# Patient Record
Sex: FEMALE | Race: WHITE | Employment: UNEMPLOYED | URBAN - METROPOLITAN AREA
[De-identification: names, ages, dates, MRNs, and addresses within clinical notes are randomized per-mention and may not be internally consistent; named-entity substitution may affect disease eponyms.]

---

## 2018-12-12 ENCOUNTER — HOSPITAL ENCOUNTER (OUTPATIENT)
Dept: PHYSICAL THERAPY | Age: 63
Discharge: HOME | End: 2018-12-12
Payer: MEDICARE

## 2018-12-12 PROCEDURE — G8978 MOBILITY CURRENT STATUS: HCPCS

## 2018-12-12 PROCEDURE — 97162 PT EVAL MOD COMPLEX 30 MIN: CPT

## 2018-12-12 PROCEDURE — 97110 THERAPEUTIC EXERCISES: CPT

## 2018-12-12 PROCEDURE — G8979 MOBILITY GOAL STATUS: HCPCS

## 2018-12-12 NOTE — PROGRESS NOTES
PT DAILY TREATMENT NOTE/FOOT AND ANKLE EVAL 10-18    Patient Name: Santos Kendall  Date:2018  : 1955  [x]  Patient  Verified  Payor: /    In time:8:20  Out time:9:12  Total Treatment Time (min): 52  Visit #: 1 of     Medicare/[facility] Only   Total Timed Codes (min):  29 1:1 Treatment Time:  52     Treatment Area: Right ankle sprain [S93.401A]    SUBJECTIVE  Pain Level (0-10 scale): 0/10  []constant []intermittent []improving []worsening []no change since onset    Any medication changes, allergies to medications, adverse drug reactions, diagnosis change, or new procedure performed?: [x] No    [] Yes (see summary sheet for update)  Subjective functional status/changes:       Pt is a 69 yo F who presents with right ankle pain s/p fall in 2018 during which she stood up after prolonged sitting and fell forward, landing with her right foot underneath of her. Imaging revealed avulsion fracture.   She had a CAM boot since initial injury until being cleared to DC the boot within the past week.       Barriers: []pain []financial []time []transportation []other  Motivation: high  Substance use: []Alcohol []Tobacco []other:   FABQ Score: []low []elevate  Cognition: A & O x 4    Other:    OBJECTIVE/EXAMINATION    23 min [x]Eval                  []Re-Eval       24 min Therapeutic Exercise:  [] See flow sheet : see HEP    Rationale: increase ROM and increase strength to improve the patients ability to improve ease of prolonged standing/ambulation    5 min Therapeutic Activity:  []  See flow sheet :   Rationale: Educated patient regarding findings of evaluation, 3 systems of balance, ankle/hip/stepping strategy, ice/heat use 10-15 minutes, ankle pumps to reduce swelling, normal foot mechanics with ambulation, new therex technique and purpose, purpose of therapy, and therapy plan in order to ensure pt understanding/compliance with therapy         With   [] TE   [] TA   [] neuro   [] other: Patient Education: [x] Review HEP    [] Progressed/Changed HEP based on:   [] positioning   [] body mechanics   [] transfers   [] heat/ice application    [] other:      Other Objective/Functional Measures:     /80 taken manually prior to therapy     Physical Therapy Evaluation  - Foot and Ankle    Gait: [] Normal    [x] Abnormal    [] Antalgic    [] NWB    Device:  Describe: SPC held on right, decreased heel strike on right, SPC advancing inconsistently with left/right foot     ROM/Strength  [] Unable to assess at this time      AROM        PROM            Strength (1-5)   Left Right Left Right Left  Right   Dorsiflexion 19 4   4 4   Plantarflexion 38 42       Inversion 12 11   4 4-   Eversion 42 26   3+ 4-   Great Toe Ext         Great Toe Flex           Hip Flexion: 4/5 B   Knee Extension: 4+/5 B  Knee Flexion: 4/5 B    Flexibility: [] Unable to assess at this time  Gastroc:    (L) Tightness [] WNL   [] Min   [] Mod   [] Severe    (R) Tightness [] WNL   [] Min   [x] Mod   [] Severe  Soleus:    (L) Tightness [] WNL   [] Min   [] Mod   [] Severe    (R) Tightness [] WNL   [x] Min   [] Mod   [] Severe    Palpation:   No tenderness bony prominences or musculature of right foot/ankle    Optional Tests:    Other tests/ comments:  SLS: Left 5 seconds, Right 4 seconds  Pt presents with soft ankle brace  No increased pain following session      Pain Level (0-10 scale) post treatment: 0/10    ASSESSMENT/Changes in Function: See POC    Patient will continue to benefit from skilled PT services to modify and progress therapeutic interventions, address functional mobility deficits, address ROM deficits, address strength deficits, analyze and address soft tissue restrictions, analyze and cue movement patterns, assess and modify postural abnormalities, address imbalance/dizziness and instruct in home and community integration to attain remaining goals.      [x]  See Plan of Care  []  See progress note/recertification  []  See Discharge Summary         Progress towards goals / Updated goals:  See POC    PLAN  [x]  Upgrade activities as tolerated     []  Continue plan of care  [x]  Update interventions per flow sheet       []  Discharge due to:_  []  Other:_      Rodolfo Garza PT 12/12/2018  7:56 AM

## 2018-12-12 NOTE — PROGRESS NOTES
[facility]  [facility] [employer] [facility] [address]  [phone] fax    Plan of Care/ Statement of Necessity for Physical Therapy Services    Patient name: Massiel Kenney Start of Care: 2018   Referral source: Carl Cassidy* : 1955    Medical Diagnosis: Right ankle sprain [S93.401A]  Payor: /  Onset Date:2018    Treatment Diagnosis: Right Ankle Pain    Prior Hospitalization: see medical history Provider#: [id number]   Medications: Verified on Patient summary List    Comorbidities:  Tobacco use (<1 pack per day)   Prior Level of Function: Ind with ambulation, lives with  in a one story home with 3 steps to enter with HR      The Plan of Care and following information is based on the information from the initial evaluation. Assessment/ key information: Pt is a 69 yo F who presents with right ankle pain s/p fall in 2018 during which she stood up after prolonged sitting and fell forward, landing with her right foot underneath of her. Pt reports a hx of falls with reason unknown, and she is following up with MD for further testing once ankle impairments have resolved. Denies dizziness. Imaging revealed 2 avulsion fractures per pt. She had a CAM boot since initial injury until being cleared to DC the boot within the past week. Pt presents with soft ankle brace and SPC, ambulating with SPC held on right, decreased heel strike on right, and SPC advancing inconsistently with left/right foot. Mild swelling is evident over lateral aspect of right ankle. Mild ankle AROM deficits are present, with greatest limitation evident in DF (4 dgrs). B ankle strength is decreased, and SLS is limited to 2 seconds, likely contributing to decreased stability during stance phase of gait. Further balance assessment was not performed d/t time constraint and will be performed in future sessions.   Pt will benefit from skilled PT to address strength, ROM, flexibility, edema, balance, and functional mobility deficits for return to PLOF. Evaluation Complexity History MEDIUM  Complexity : 1-2 comorbidities / personal factors will impact the outcome/ POC ; Examination MEDIUM Complexity : 3 Standardized tests and measures addressing body structure, function, activity limitation and / or participation in recreation  ;Presentation MEDIUM Complexity : Evolving with changing characteristics  ; Clinical Decision Making MEDIUM Complexity : FOTO score of 26-74  Overall Complexity Rating: MEDIUM  Problem List: pain affecting function, decrease ROM, decrease strength, edema affecting function, impaired gait/ balance, decrease ADL/ functional abilitiies, decrease activity tolerance and decrease flexibility/ joint mobility   Treatment Plan may include any combination of the following: Therapeutic exercise, Therapeutic activities, Neuromuscular re-education, Physical agent/modality, Gait/balance training, Manual therapy, Patient education, Self Care training, Functional mobility training, Home safety training and Stair training  Patient / Family readiness to learn indicated by: asking questions, trying to perform skills and interest  Persons(s) to be included in education: patient (P) and family support person (FSP);list daughter  Barriers to Learning/Limitations: None  Patient Goal (s): walk better and no pain  Patient Self Reported Health Status: good  Rehabilitation Potential: good    Short Term Goals: To be accomplished in 2 weeks:  1. Pt will be compliant with initial HEP to improve therapy outcomes   2. Pt will ambulate with correct sequencing with SPC in order to improve safety with ambulation   Long Term Goals: To be accomplished in 8 weeks:  1. Pt will improve FOTO by 16 points in order to demonstrate functional improvement  2. Pt will improve right ankle DF AROM to 10 dgrs in order to normalize gait mechanics for improved ambulatory tolerance  3.  Pt will improve right ankle strength to 4+/5 in order to improve ease of prolonged ambulation. 4. Pt will improve right SLS to 5 seconds in order to demonstrate improved stability during stance phase of gait. 5. Pt will report 50% improvement in order to improve QOL. Frequency / Duration: Patient to be seen 2-3 times per week for 8 weeks. Patient/ Caregiver education and instruction: Diagnosis, prognosis, activity modification and exercises   [x]  Plan of care has been reviewed with PTA    G-Codes (GP)  Mobility   Current  CK= 40-59%   Goal  CJ= 20-39%    The severity rating is based on clinical judgment and the FOTO score. Certification Period: 12/12/18 to 2/10/19  Aj Steele PT 12/12/2018 7:56 AM    ________________________________________________________________________    I certify that the above Therapy Services are being furnished while the patient is under my care. I agree with the treatment plan and certify that this therapy is necessary.     Physician's Signature:____________Date:_________TIME:________    ** Signature, Date and Time must be completed for valid certification **    Please sign and return to [facility]  Joel Barrios  [address] [place] [address]  [phone] fax

## 2018-12-19 ENCOUNTER — HOSPITAL ENCOUNTER (OUTPATIENT)
Dept: PHYSICAL THERAPY | Age: 63
Discharge: HOME | End: 2018-12-19
Payer: MEDICARE

## 2018-12-19 PROCEDURE — 97530 THERAPEUTIC ACTIVITIES: CPT

## 2018-12-19 PROCEDURE — 97140 MANUAL THERAPY 1/> REGIONS: CPT

## 2018-12-19 PROCEDURE — 97110 THERAPEUTIC EXERCISES: CPT

## 2018-12-19 NOTE — PROGRESS NOTES
PT DAILY TREATMENT NOTE - [facility]     Patient Name: Louie Favre  Date:2018  : 1955  [x]  Patient  Verified  Payor: [place] MED[place]ARE / Plan: [place] MEDICARE PART A & B / Product Type: Medicare /    In time:1100  Out time:1132  Total Treatment Time (min): 32  Total Timed Codes (min): 32  1:1 Treatment Time ( [address] only): 32   Visit #: 2 of     Treatment Area: Right ankle sprain [S93.401A]    SUBJECTIVE  Pain Level (0-10 scale): 110  Any medication changes, allergies to medications, adverse drug reactions, diagnosis change, or new procedure performed?: [x] No    [] Yes (see summary sheet for update)  Subjective functional status/changes:   [] No changes reported  Reports the plastic piece on the lateral part of her brace is irritating her ankle. She did a lot of walking yesterday and her ankle was swollen a lot last night. OBJECTIVE      24 min Therapeutic Exercise:  [] See flow sheet :   Rationale: increase ROM and increase strength to improve the patients ability to ease with ADL's  onale: increase ROM, increase strength and improve coordination  to improve the patients ability to ease with ADL's    8 min Manual Therapy:  TPR to gactroc. Rationale: decrease pain, increase ROM and decrease trigger points to ease with ambulation          With   [] TE   [] TA   [] neuro   [] other: Patient Education: [x] Review HEP    [] Progressed/Changed HEP based on:   [] positioning   [] body mechanics   [] transfers   [] heat/ice application    [] other:      Other Objective/Functional Measures: Pt is able to ambulate w/o AD with mild antalgic gait. Trigger points to gastroc. Ankle x 4 with OTB. Pain Level (0-10 scale) post treatment: 0/10    ASSESSMENT/Changes in Function: Progressing well in therapy. Pt does not use her cane at home. She says she can tolerate walking w/o the cane in her house and would like to stop using the cane soon.        Patient will continue to benefit from skilled PT services to modify and progress therapeutic interventions, address functional mobility deficits, address ROM deficits, address strength deficits, analyze and address soft tissue restrictions, analyze and cue movement patterns, analyze and modify body mechanics/ergonomics, assess and modify postural abnormalities and address imbalance/dizziness to attain remaining goals. [x]  See Plan of Care  []  See progress note/recertification  []  See Discharge Summary         Progress towards goals / Updated goals:  1. Pt will be compliant with initial HEP to improve therapy outcomes   2. Pt will ambulate with correct sequencing with SPC in order to improve safety with ambulation   Long Term Goals: To be accomplished in 8 weeks:  1. Pt will improve FOTO by 16 points in order to demonstrate functional improvement  2. Pt will improve right ankle DF AROM to 10 dgrs in order to normalize gait mechanics for improved ambulatory tolerance  3. Pt will improve right ankle strength to 4+/5 in order to improve ease of prolonged ambulation. 4. Pt will improve right SLS to 5 seconds in order to demonstrate improved stability during stance phase of gait.   5. Pt will report 50% improvement in order to improve QOL.       PLAN  [x]  Upgrade activities as tolerated     [x]  Continue plan of care  []  Update interventions per flow sheet       []  Discharge due to:_  []  Other:_      Hiram Olivas PT 12/19/2018  11:09 AM    Future Appointments   Date Time Provider Jenny Bro   12/24/2018  9:00 AM Mark Yoon PT [id number] [facility]   12/31/2018  2:30 PM Valentino Banda PT [id number] [facility]   1/3/2019 10:30 AM Valentino Banda PT [id number]   1/7/2019 10:30 AM Valentino Banda PT [id number] [facility]   1/10/2019 11:30 AM Mark Yoon PT [id number] [facility]   1/14/2019 11:00 AM Valentino Banda PT [id number] [facility]   1/17/2019 11:30 AM Mark Yoon PT [id number] [facility]   1/21/2019 11:00 AM Valentino Banda, PT [id number] [facility]   1/24/2019 11:30 AM Mark Yoon PT AZCHU [facility]   1/28/2019 10:30 AM Maciej Evans PT [id number] [facility]   1/31/2019 11:30 AM Maciej Evans PT [id number] [facility]

## 2018-12-24 ENCOUNTER — HOSPITAL ENCOUNTER (OUTPATIENT)
Dept: PHYSICAL THERAPY | Age: 63
Discharge: HOME | End: 2018-12-24
Payer: MEDICARE

## 2018-12-24 PROCEDURE — 97110 THERAPEUTIC EXERCISES: CPT

## 2018-12-24 NOTE — PROGRESS NOTES
PT DAILY TREATMENT NOTE - [facility]     Patient Name: Mena Choi  Date:2018  : 1955  [x]  Patient  Verified  Payor: Nakita Evangelista / Plan: [place] MEDICARE PART A & B / Product Type: Medicare /    In time:908  Out time:934  Total Treatment Time (min): 26  Total Timed Codes (min): 26  1:1 Treatment Time ( [address] only): 26   Visit #: 3 of     Treatment Area: Right ankle sprain [S93.401A]    SUBJECTIVE  Pain Level (0-10 scale): 0/10  Any medication changes, allergies to medications, adverse drug reactions, diagnosis change, or new procedure performed?: [x] No    [] Yes (see summary sheet for update)  Subjective functional status/changes:   [] No changes reported  No brace and no cane today. Pt reports she will be driving 3 hrs to visit daughter for maritza. OBJECTIVE    26 min Therapeutic Exercise:  [] See flow sheet :   Rationale: increase ROM, increase strength, improve coordination and improve balance to improve the patients ability to ease with ADL's    With   [] TE   [] TA   [] neuro   [] other: Patient Education: [x] Review HEP    [] Progressed/Changed HEP based on:   [] positioning   [] body mechanics   [] transfers   [] heat/ice application    [] other:      Other Objective/Functional Measures: Foam EO/EC=30 sec  Slow Marching w/o UE   Ambulates with slight Antalgic gait. Pain Level (0-10 scale) post treatment: 0/10    ASSESSMENT/Changes in Function: Progressing well. Pt reports she is doing better w/o the brace and cane. Issued BTB for hip Abduction  Issued tensogrip compression to wear for travels.      Patient will continue to benefit from skilled PT services to modify and progress therapeutic interventions, address functional mobility deficits, address ROM deficits, address strength deficits, analyze and address soft tissue restrictions, analyze and cue movement patterns, analyze and modify body mechanics/ergonomics, assess and modify postural abnormalities and address imbalance/dizziness to attain remaining goals. []  See Plan of Care  []  See progress note/recertification  []  See Discharge Summary         Progress towards goals / Updated goals:  1. Pt will be compliant with initial HEP to improve therapy outcomes   MET. 12/24/18  2. Pt will ambulate with correct sequencing with SPC in order to improve safety with ambulation   Long Term Goals: To be accomplished in 8 weeks:  1. Pt will improve FOTO by 16 points in order to demonstrate functional improvement  2. Pt will improve right ankle DF AROM to 10 dgrs in order to normalize gait mechanics for improved ambulatory tolerance  3. Pt will improve right ankle strength to 4+/5 in order to improve ease of prolonged ambulation. 4. Pt will improve right SLS to 5 seconds in order to demonstrate improved stability during stance phase of gait.   5. Pt will report 50% improvement in order to improve QOL.           PLAN  [x]  Upgrade activities as tolerated     [x]  Continue plan of care  []  Update interventions per flow sheet       []  Discharge due to:_  []  Other:_      Danny Rodriguez PT 12/24/2018  9:08 AM    Future Appointments   Date Time Provider Jenny Bro   12/31/2018  2:30 PM Anuj Smith, PT [email or web address] [facility]   1/3/2019 10:30 AM Anuj Smith, PT [id number] [facility]   1/7/2019 10:30 AM Anuj Smith, PT [id number] [facility]   1/10/2019 11:30 AM Mildred Talamantes, PT [email or web address] [facility]   1/14/2019 11:00 AM Anuj Smith, PT [id number] [facility]   1/17/2019 11:30 AM Mildred Talamantes, PT [id number] [facility]   1/21/2019 11:00 AM Anuj Smith, PT [id number] [facility]   1/24/2019 11:30 AM Mlidred Talamantes, PT [id number] [facility]   1/28/2019 10:30 AM Mildred Talamantes, PT THSOLHK [facility]   1/31/2019 11:30 AM Mildred Talamantes, PT [id number] [facility]

## 2018-12-31 ENCOUNTER — HOSPITAL ENCOUNTER (OUTPATIENT)
Dept: PHYSICAL THERAPY | Age: 63
Discharge: HOME | End: 2018-12-31
Payer: MEDICARE

## 2018-12-31 PROCEDURE — 97110 THERAPEUTIC EXERCISES: CPT

## 2018-12-31 NOTE — PROGRESS NOTES
PT DAILY TREATMENT NOTE 10-18 Patient Name: Carlos Miranda Date:2018 : 1955 [x]  Patient  Verified Payor: [place] MEDICARE / Plan: [place] El y / Product Type: Medicare / In time:2:25  Out time:3:05 Total Treatment Time (min): 40 Visit #: 4 of  Medicare/BCBS Only Total Timed Codes (min):  40 40 Treatment Area: Right ankle sprain [S93.401A] SUBJECTIVE Pain Level (0-10 scale): 0/10 Any medication changes, allergies to medications, adverse drug reactions, diagnosis change, or new procedure performed?: [x] No    [] Yes (see summary sheet for update) Subjective functional status/changes:   [] No changes reported Pt reports she has been out of town for the past week for the holidays. She had some pain in her anterior ankle yesterday but it's better now. She reports 30% improvement with therapy. She has stopped wearing the ankle brace. She has a good pair of tennis shoes and does not believe she needs the brace. She is just careful when she's walking. Her MD did not give her instructions for when to DC the brace. She is supposed to follow up with her MD at the end of January. OBJECTIVE 40 min Therapeutic Exercise:  [x] See flow sheet :  
Rationale: increase ROM, increase strength, improve coordination, improve balance and increase proprioception to improve the patients ability to improve ease of prolonged ambulation and daily tasks With 
 [] TE 
 [] TA 
 [] neuro 
 [] other: Patient Education: [x] Review HEP [] Progressed/Changed HEP based on:  
[] positioning   [] body mechanics   [] transfers   [] heat/ice application   
[] other:   
 
Other Objective/Functional Measures:  
 
No difficulty with 1# ankle weights during standing interventions Continues to require intermittent hand-held assist on parallel bars with Romberg Foam EC Challenged with marble , ceased after 1 minute d/t significant difficulty with grasping marbles Pain Level (0-10 scale) post treatment: 0/10 ASSESSMENT/Changes in Function:  
 
Pt is making slow, steady progress towards initial goals in therapy. Strength and balance continue to improve, evidenced by improving ease/decreasing UE support with interventions. Will continue to address strength, ROM, and balance deficits for improved ease of ambulation and return to PLOF. Patient will continue to benefit from skilled PT services to modify and progress therapeutic interventions, address functional mobility deficits, address ROM deficits, address strength deficits, analyze and address soft tissue restrictions, analyze and cue movement patterns, assess and modify postural abnormalities, address imbalance/dizziness and instruct in home and community integration to attain remaining goals. Progress towards goals / Updated goals: 1. Pt will be compliant with initial HEP to improve therapy outcomes MET. 12/24/18 2. Pt will ambulate with correct sequencing with SPC in order to improve safety with ambulation  
Long Term Goals: To be accomplished in 8 weeks: 1. Pt will improve FOTO by 16 points in order to demonstrate functional improvement 2. Pt will improve right ankle DF AROM to 10 dgrs in order to normalize gait mechanics for improved ambulatory tolerance 3. Pt will improve right ankle strength to 4+/5 in order to improve ease of prolonged ambulation. 4. Pt will improve right SLS to 5 seconds in order to demonstrate improved stability during stance phase of gait. 5. Pt will report 50% improvement in order to improve QOL. Progressing. Pt reports 30% improvement with therapy 12/31/18 PLAN 
[]  Upgrade activities as tolerated     [x]  Continue plan of care 
[]  Update interventions per flow sheet      
[]  Discharge due to:_ 
[]  Other:_ Skye Guerrero, PT 12/31/2018  2:35 PM 
 
Future Appointments Date Time Provider Jenny Bro 1/3/2019 10:30 AM Tanja Hams, PT [id number] [facility]  
1/7/2019 10:30 AM Tanja Villa, PT [email or web address] [facility]  
1/10/2019 11:30 AM Hillary Paget, PT [id number] [facility]  
1/14/2019 11:00 AM Tanja Villa, PT [id number] [facility]  
1/17/2019 11:30 AM Hillary Paget, PT [email or web address] [facility]  
1/21/2019 11:00 AM Tanja Villa, PT [email or web address] [facility]  
1/24/2019 11:30 AM Hillary Paget, PT [id number] [facility]  
1/28/2019 10:30 AM Hillary Paget, PT [id number] [facility]  
1/31/2019 11:30 AM Hillary Paget, PT [id number] [facility]

## 2019-01-03 ENCOUNTER — HOSPITAL ENCOUNTER (OUTPATIENT)
Dept: PHYSICAL THERAPY | Age: 64
Discharge: HOME | End: 2019-01-03
Payer: MEDICARE

## 2019-01-03 PROCEDURE — 97110 THERAPEUTIC EXERCISES: CPT

## 2019-01-03 NOTE — PROGRESS NOTES
PT DAILY TREATMENT NOTE 10-18    Patient Name: Selina Bacon  Date:1/3/2019  : 1955  [x]  Patient  Verified  Payor: [place] MED[place]ARE / Plan: [place] MEDICARE PART A & B / Product Type: Medicare /    In time:10:30  Out time:10:58  Total Treatment Time (min): 28  Visit #: 5 of -    Medicare/BCBS Only   Total Timed Codes (min):  28 1:1 Treatment Time:  27       Treatment Area: Right ankle sprain [S93.401A]    SUBJECTIVE  Pain Level (0-10 scale): 0/10  Any medication changes, allergies to medications, adverse drug reactions, diagnosis change, or new procedure performed?: [x] No    [] Yes (see summary sheet for update)  Subjective functional status/changes:   [] No changes reported  Pt reports that she is no longer using the [address] for any ambulation. She put her brace back on yesterday because her ankle was swollen and sore, and it felt much better with the brace on. She is going to continue wearing her brace. Next MD at the end of January. OBJECTIVE    28 min Therapeutic Exercise:  [x] See flow sheet :   Rationale: increase ROM, increase strength, improve coordination, improve balance and increase proprioception to improve the patients ability to improve ease of prolonged ambulation and daily tasks        With   [] TE   [] TA   [] neuro   [] other: Patient Education: [x] Review HEP    [] Progressed/Changed HEP based on:   [] positioning   [] body mechanics   [] transfers   [] heat/ice application    [] other:      Other Objective/Functional Measures:     SLS: Right 7 seconds, Left 5 seconds   No difficulty with tandem stance  Hand held assist initially during [employer] EC, no LOB with remaining ~25 seconds  No pain/difficulty with standing eccentric HR  No pain with therapy     Pain Level (0-10 scale) post treatment: 0/10    ASSESSMENT/Changes in Function:     Pt is making steady progress in therapy. SLS and walking tolerance are steadily improving, and pt reports 70% improvement with therapy.   She does not believe she has any functional limitations with her typical daily tasks at this time, but she is going to think about her goals for therapy prior to next session. Will continue to address strength and static/dynamic balance deficits for return to PLOF. Patient will continue to benefit from skilled PT services to modify and progress therapeutic interventions, address functional mobility deficits, address ROM deficits, address strength deficits, analyze and address soft tissue restrictions, analyze and cue movement patterns, assess and modify postural abnormalities, address imbalance/dizziness and instruct in home and community integration to attain remaining goals. Progress towards goals / Updated goals:  1. Pt will be compliant with initial HEP to improve therapy outcomes MET. 12/24/18  2. Pt will ambulate with correct sequencing with SPC in order to improve safety with ambulation DC Goal.  Pt is no longer using [facility] 1/3/18   Long Term Goals: To be accomplished in 8 weeks:  1. Pt will improve FOTO by 16 points in order to demonstrate functional improvement  2. Pt will improve right ankle DF AROM to 10 dgrs in order to normalize gait mechanics for improved ambulatory tolerance  3. Pt will improve right ankle strength to 4+/5 in order to improve ease of prolonged ambulation. 4. Pt will improve right SLS to 5 seconds in order to demonstrate improved stability during stance phase of gait. Goal met. SLS: Right 7 seconds, Left 5 seconds 1/3/18  5. Pt will report 50% improvement in order to improve QOL. Progressing.   Pt reports 70% improvement with therapy 1/3/18     PLAN  []  Upgrade activities as tolerated     [x]  Continue plan of care  []  Update interventions per flow sheet       []  Discharge due to:_  []  Other:_      Radha Canales PT 1/3/2019  10:37 AM    Future Appointments   Date Time Provider Jenny Bro   1/7/2019 10:30 AM [facility] [address]   1/10/2019 11:30 AM Dorian Lloyd Jessica Merida [id number] [facility]   1/14/2019 11:00 AM Nahid Carreno, PT [id number] [facility]   1/17/2019 11:30 AM Patrica Dyson, PT EYMEME [facility]   1/21/2019 11:00 AM Nahid Carreno, PT [id number] [facility]   1/24/2019 11:30 AM Patrica Dyson, PT [id number] [facility]   1/28/2019 10:30 AM Patrica Dyson, PT [id number] [facility]   1/31/2019 11:30 AM Patrica Dyson, PT [id number] [facility]

## 2019-01-07 ENCOUNTER — HOSPITAL ENCOUNTER (OUTPATIENT)
Dept: PHYSICAL THERAPY | Age: 64
Discharge: HOME | End: 2019-01-07
Payer: MEDICARE

## 2019-01-07 PROCEDURE — G8979 MOBILITY GOAL STATUS: HCPCS

## 2019-01-07 PROCEDURE — G8980 MOBILITY D/C STATUS: HCPCS

## 2019-01-07 PROCEDURE — 97110 THERAPEUTIC EXERCISES: CPT

## 2019-01-07 NOTE — PROGRESS NOTES
PT DISCHARGE DAILY NOTE AND ZDALOVO03-18    Date:2019  Patient name: Moody Bess Start of Care: 18   Referral source: Serena Bailey : 1955   Medical/Treatment Diagnosis: Right ankle sprain [S93.401A] Onset Date:2018      Prior Hospitalization: see medical history Provider#: [id number]   Medications: Verified on Patient Summary List    Comorbidities:  Tobacco use (<1 pack per day)   Prior Level of Function: Ind with ambulation, lives with  in a one story home with 3 steps to enter with HR                        Visits from Start of Care: 6    Missed Visits: 0    Reporting Period : 18 to 19    [x]  Patient  Verified  Payor: [place] MEDICARE / Plan: VA MEDICARE PART A & B / Product Type: Medicare /    In time:10:25  Out time:11:05  Total Treatment Time (min): 40  Total Timed Codes (min): 40  1:1 Treatment Time ([address] only): 40   Visit #: 6 of     SUBJECTIVE  Pain Level (0-10 scale): 0/10  Any medication changes, allergies to medications, adverse drug reactions, diagnosis change, or new procedure performed?: [x] No    [] Yes (see summary sheet for update)  Subjective functional status/changes:   [] No changes reported  Pt reports 100% improvement with therapy.      OBJECTIVE    40 min Therapeutic Exercise:  [x] See flow sheet :   Rationale: increase ROM, increase strength, improve balance and increase proprioception to improve the patients ability to continue to progress independently upon DC             With   [] TE   [] TA   [] neuro   [] other: Patient Education: [x] Review HEP    [] Progressed/Changed HEP based on:   [] positioning   [] body mechanics   [] transfers   [] heat/ice application    [] other:      Other Objective/Functional Measures:     MMT Right Ankle  DF 4+/5  Inversion: 4+5  Eversion: 4/5  PF: 4/5 (12 SL HR)     Right Ankle DF AROM: 14 dgrs     FOTO: 74     Pain Level (0-10 scale) post treatment: 0/10    Summary of Care:  Goal: Pt will be compliant with initial HEP to improve therapy outcomes  Status at last note/certification: Goal met. Status at discharge: met    Goal: Pt will ambulate with correct sequencing with SPC in order to improve safety with ambulation  Status at last note/certification: DC Goal.  Pt is no longer using SPC  Status at discharge: met    Goal: Pt will improve FOTO by 16 points in order to demonstrate functional improvement  Status at last note/certification: Goal met. Improved 27 points   Status at discharge: met    Goal: Pt will improve right ankle DF AROM to 10 dgrs in order to normalize gait mechanics for improved ambulatory tolerance  Status at last note/certification: Goal met. 14 dgrs   Status at discharge: met    Goal:Pt will improve right ankle strength to 4+/5 in order to improve ease of prolonged ambulation. Status at last note/certification: Goal met/Progressing   DF 4+/5  Inversion: 4+5  Eversion: 4/5  PF: 4/5   Status at discharge: met    Goal: Pt will improve right SLS to 5 seconds in order to demonstrate improved stability during stance phase of gait. Status at last note/certification:  Goal met. SLS: Right 7 seconds, Left 5 seconds  Status at discharge: met    Goal: Pt will report 50% improvement in order to improve QOL  Status at last note/certification: Goal met. Pt reports 100% improvement   Status at discharge: met    ASSESSMENT/Changes in Function:     Pt has made steady progress in therapy, meeting 100% of initial goals with exception of SPC goal as pt is no longer using SPC. Right ankle strength/ROM and SLS have significantly improved. Pt reports 100% improvement with therapy, and significant improvement in FOTO score is indicative of functional improvement. Pt was given updated HEP to address remaining deficits and is DC at this time as she has met therapy goals.      G-Codes (GP)  Mobility   [id number] Goal  CJ= 20-39%  D/C  CJ= 20-39%    The severity rating is based on clinical judgment and the FOTO score.      Thank you for this referral!     PLAN  [x]Discontinue therapy: [x]Patient has reached or is progressing toward set goals      []Patient is non-compliant or has abdicated      []Due to lack of appreciable progress towards set goals    Tammy Floyd, PT 1/7/2019  10:34 AM

## 2019-01-10 ENCOUNTER — APPOINTMENT (OUTPATIENT)
Dept: PHYSICAL THERAPY | Age: 64
End: 2019-01-10
Payer: MEDICARE

## 2019-01-14 ENCOUNTER — APPOINTMENT (OUTPATIENT)
Dept: PHYSICAL THERAPY | Age: 64
End: 2019-01-14
Payer: MEDICARE

## 2019-01-17 ENCOUNTER — APPOINTMENT (OUTPATIENT)
Dept: PHYSICAL THERAPY | Age: 64
End: 2019-01-17
Payer: MEDICARE

## 2019-01-21 ENCOUNTER — APPOINTMENT (OUTPATIENT)
Dept: PHYSICAL THERAPY | Age: 64
End: 2019-01-21
Payer: MEDICARE

## 2019-01-24 ENCOUNTER — APPOINTMENT (OUTPATIENT)
Dept: PHYSICAL THERAPY | Age: 64
End: 2019-01-24
Payer: MEDICARE

## 2019-01-28 ENCOUNTER — APPOINTMENT (OUTPATIENT)
Dept: PHYSICAL THERAPY | Age: 64
End: 2019-01-28
Payer: MEDICARE

## 2019-01-31 ENCOUNTER — APPOINTMENT (OUTPATIENT)
Dept: PHYSICAL THERAPY | Age: 64
End: 2019-01-31
Payer: MEDICARE

## 2020-01-13 ENCOUNTER — OFFICE VISIT (OUTPATIENT)
Dept: ORTHOPEDIC SURGERY | Age: 65
End: 2020-01-13

## 2020-01-13 VITALS
SYSTOLIC BLOOD PRESSURE: 139 MMHG | OXYGEN SATURATION: 98 % | DIASTOLIC BLOOD PRESSURE: 78 MMHG | HEART RATE: 82 BPM | WEIGHT: 133.2 LBS | BODY MASS INDEX: 20.91 KG/M2 | RESPIRATION RATE: 15 BRPM | TEMPERATURE: 96.2 F | HEIGHT: 67 IN

## 2020-01-13 DIAGNOSIS — M25.511 RIGHT SHOULDER PAIN, UNSPECIFIED CHRONICITY: ICD-10-CM

## 2020-01-13 DIAGNOSIS — M75.41 SHOULDER IMPINGEMENT SYNDROME, RIGHT: Primary | ICD-10-CM

## 2020-01-13 RX ORDER — CETIRIZINE HCL 10 MG
TABLET ORAL DAILY
COMMUNITY

## 2020-01-13 RX ORDER — CHROMIUM PICOLINATE 200 MCG
TABLET ORAL DAILY
COMMUNITY

## 2020-01-13 RX ORDER — FLUTICASONE PROPIONATE 50 MCG
2 SPRAY, SUSPENSION (ML) NASAL
COMMUNITY

## 2020-01-13 RX ORDER — TRIAMCINOLONE ACETONIDE 40 MG/ML
40 INJECTION, SUSPENSION INTRA-ARTICULAR; INTRAMUSCULAR ONCE
Qty: 1 ML | Refills: 0
Start: 2020-01-13 | End: 2020-01-13

## 2020-01-13 RX ORDER — PANTOPRAZOLE SODIUM 40 MG/1
40 TABLET, DELAYED RELEASE ORAL DAILY
COMMUNITY

## 2020-01-13 RX ORDER — ASPIRIN 81 MG/1
TABLET ORAL DAILY
COMMUNITY

## 2020-01-13 NOTE — PATIENT INSTRUCTIONS
Aftercare Instructions following your Cortisone Injection:    Your cortisone injection today included both a pain reliever and a steroid. You can expect the pain to begin to improve in the next 30-45 mins secondary to the pain reliever. The steroid medicine itself generally takes up to 48 hours to being to decrease the inflammation. We recommend to avoid strenuous activities on the day you get the injection. The following day you may gradually increase your activities as tolerated. NOTHING should cause an increase in pain or swelling    Rarely the following side effects can occur:  1. Flushing or redness - this is a normal reaction and will subside within a day. Benedryl and tylenol can help decrease the symptoms  2. Acute increase in pain - this is also something normal that can occur day of the injection. If the pain occures, you can put ice or a cold pack on the painful area for 10 to 20 minutes at a time. Put a thin cloth between the ice and your skin. 3. If you are a diabetic a cortisone injection can cause your glucose (sugar) to rise. Please monitor your sugar closely for 2 days following the injection. If your sugar is elevated beyond your normal please contact your PCP immediately. We recommend calling when the pain returns.  If the pain returns in next few weeks we will order an MRI

## 2020-01-13 NOTE — PROGRESS NOTES
1. Have you been to the ER, urgent care clinic since your last visit? Hospitalized since your last visit? No    2. Have you seen or consulted any other health care providers outside of the [address] [place] [address] since your last visit? Include any pap smears or colon screening.  No

## 2020-01-13 NOTE — PROGRESS NOTES
Juancarlos Cabello  1955   Chief Complaint   Patient presents with    Shoulder Pain     right shoulder pain        HISTORY OF PRESENT ILLNESS  Juancarlos Cabello is a 77 y.o. female who presents today for evaluation of right shoulder pain. She notes pain started gradually about 1 month ago. No injury. Gradually worsening. Worse when she reaches overhead and lifts. She notes prior surgery at [facility] about 17 years ago. Patient describes the pain as sharp and stabbing that is Constant in nature. Symptoms are worse with overhead work and lifting and is better with  inactivity. Associated symptoms include weakness. Since problem started, it: has worsened. Pain does wake patient up at night. Has taken taking tylenol and ibuprofen for the problem. Pain is a 9/10. Has tried following treatments: Injections:NO; Brace:NO;  Therapy:NO; Cane/Crutch:NO       No Known Allergies     Past Medical History:   Diagnosis Date    Arthritis       Social History     Socioeconomic History    Marital status: UNKNOWN     Spouse name: Not on file    Number of children: Not on file    Years of education: Not on file    Highest education level: Not on file   Occupational History    Not on file   Social Needs    Financial resource strain: Not on file    Food insecurity:     Worry: Not on file     Inability: Not on file    Transportation needs:     Medical: Not on file     Non-medical: Not on file   Tobacco Use    Smoking status: Current Every Day Smoker    Smokeless tobacco: Never Used   Substance and Sexual Activity    Alcohol use: Not on file    Drug use: Not on file    Sexual activity: Not on file   Lifestyle    Physical activity:     Days per week: Not on file     Minutes per session: Not on file    Stress: Not on file   Relationships    Social connections:     Talks on phone: Not on file     Gets together: Not on file     Attends [employer] service: Not on file     Active member of club or organization: Not on file     Attends meetings of clubs or organizations: Not on file     Relationship status: Not on file    Intimate partner violence:     Fear of current or ex partner: Not on file     Emotionally abused: Not on file     Physically abused: Not on file     Forced sexual activity: Not on file   Other Topics Concern    Not on file   Social History Narrative    Not on file      Past Surgical History:   Procedure Laterality Date    HAND/FINGER SURGERY UNLISTED      HX  SECTION        Family History   Problem Relation Age of Onset    Arthritis-osteo Mother     Arthritis-osteo Father       Current Outpatient Medications   Medication Sig    pantoprazole (PROTONIX) 40 mg tablet Take 40 mg by mouth daily.  cetirizine (ZYRTEC) 10 mg tablet Take  by mouth.  aspirin delayed-release 81 mg tablet Take  by mouth daily.  Calcium-Cholecalciferol, D3, (CALCIUM 600 WITH VITAMIN D3) 600 mg(1,500mg) -400 unit cap Take  by mouth.  fluticasone propionate (FLONASE) 50 mcg/actuation nasal spray 2 Sprays by Both Nostrils route daily.  triamcinolone acetonide (KENALOG) 40 mg/mL injection 1 mL by Intra artICUlar route once for 1 dose. No current facility-administered medications for this visit. REVIEW OF SYSTEM   Patient denies: Weight loss, Fever/Chills, HA, Visual changes, Fatigue, Chest pain, SOB, Abdominal pain, N/V/D/C, Blood in stool or urine, Edema. Pertinent positive as above in HPI. All others were negative    PHYSICAL EXAM:   Visit Vitals  /78 (BP 1 Location: Right arm, BP Patient Position: Sitting)   Pulse 82   Temp 96.2 °F (35.7 °C) (Oral)   Resp 15   Ht 5' 7\" (1.702 m)   Wt 133 lb 3.2 oz (60.4 kg)   SpO2 98%   BMI 20.86 kg/m²     The patient is a well-developed, well-nourished female   in no acute distress. The patient is alert and oriented times three. The patient is alert and oriented times three.  Mood and affect are normal.  LYMPHATIC: lymph nodes are not enlarged and are within normal limits  SKIN: normal in color and non tender to palpation. There are no bruises or abrasions noted. NEUROLOGICAL: Motor sensory exam is within normal limits. Reflexes are equal bilaterally. There is normal sensation to pinprick and light touch  MUSCULOSKELETAL:  Examination Right shoulder   Skin Intact   AC joint tenderness -   Biceps tenderness +   Forward flexion/Elevation    Active abduction    Glenohumeral abduction 90   External rotation ROM 60   Internal rotation ROM 50   Apprehension -   Morelias Relocation -   Jerk -   Load and Shift -   Obriens -   Speeds -   Impingement sign +   Supraspinatus/Empty Can +   External Rotation Strength -, 5/5   Lift Off/Belly Press -, 5/5   Neurovascular Intact          PROCEDURE: Right shoulder Injection    Indication:Right shoulder pain/swelling    After sterile prep, 6 cc of Xylocaine and 1 cc of Kenalog were injected into the right shoulder.        [facility]  OFFICE PROCEDURE PROGRESS NOTE        Chart reviewed for the following:  Shivam WORKMAN PA, have reviewed the History, Physical and updated the Allergic reactions for [address] [place] [address] performed immediately prior to start of procedure:  Shivam WORKMAN PA-C, have performed the following reviews on [facility] prior to the start of the procedure:            * Patient was identified by name and date of birth   * Agreement on procedure being performed was verified  * Risks and Benefits explained to the patient  * Procedure site verified and marked as necessary  * Patient was positioned for comfort  * Consent was signed and verified     Time: 2:31 PM    Date of procedure: 1/13/2020    Procedure performed by:  JACQUELINE Stout    Provider assisted by: (see medication administration)    How tolerated by patient: tolerated the procedure well with no complications    Comments: none     IMAGING: 3 view xray images of right  shoulder taken in office on 1/13/2020 read and reviewed by myself reveal subacromial spur. No acute abnormalities     IMPRESSION:      ICD-10-CM ICD-9-CM    1. Shoulder impingement syndrome, right M75.41 726.2 DRAIN/INJECT LARGE JOINT/BURSA      TRIAMCINOLONE ACETONIDE INJ      triamcinolone acetonide (KENALOG) 40 mg/mL injection   2. Right shoulder pain, unspecified chronicity M25.511 719.41 AMB POC XRAY, SHOULDER; COMPLETE, 2+        PLAN:   1. Patient with right shoulder pain c/w impingement. Will inject with cortisone today. If no improvement with order MRI r/o RCT  Risk factors include: n/a  2. Yes cortisone injection indicated today   3. No Physical/Occupational Therapy indicated today  4. No diagnostic test indicated today:   5. No durable medical equipment indicated today  6. No referral indicated today   7. No medications indicated today:   8.  No Narcotic indicated today    RTC 3 weeks if pain persists     Yahaira Song PA-C  [address] [place] [address] and Spine Specialist

## 2020-02-19 ENCOUNTER — OFFICE VISIT (OUTPATIENT)
Dept: ORTHOPEDIC SURGERY | Age: 65
End: 2020-02-19

## 2020-02-19 VITALS
DIASTOLIC BLOOD PRESSURE: 70 MMHG | OXYGEN SATURATION: 99 % | HEIGHT: 67 IN | HEART RATE: 80 BPM | BODY MASS INDEX: 20.94 KG/M2 | RESPIRATION RATE: 15 BRPM | TEMPERATURE: 97.6 F | SYSTOLIC BLOOD PRESSURE: 124 MMHG | WEIGHT: 133.4 LBS

## 2020-02-19 DIAGNOSIS — M75.101 NONTRAUMATIC TEAR OF RIGHT ROTATOR CUFF, UNSPECIFIED TEAR EXTENT: Primary | ICD-10-CM

## 2020-02-19 RX ORDER — CELECOXIB 200 MG/1
200 CAPSULE ORAL 2 TIMES DAILY
Qty: 60 CAP | Refills: 0 | Status: SHIPPED | OUTPATIENT
Start: 2020-02-19 | End: 2020-05-12

## 2020-02-19 NOTE — PROGRESS NOTES
1. Have you been to the ER, urgent care clinic since your last visit? Hospitalized since your last visit? No    2. Have you seen or consulted any other health care providers outside of the [address] [place] [address] since your last visit? Include any pap smears or colon screening.  No

## 2020-02-19 NOTE — PROGRESS NOTES
Juancarlos Cabello  1955   Chief Complaint   Patient presents with    Shoulder Pain     right shoulder pain        HISTORY OF PRESENT ILLNESS  Juancarlos Cabello is a 77 y.o. female who presents today for reevaluation of right shoulder pain. Patient rates pain as 7/10 today. She notes pain started gradually about 2 months ago. No injury. Gradually worsening. Worse when she reaches overhead and lifts. She notes prior surgery at [address] about 17 years ago. At last OV on 1/13/2020, patient had a cortisone injection which provided minimal relief. She notes the pain has worsened since last OV. Pt reports a popping sensation and limited ROM with lifting the arm. Pain at night. Has been taking Tylenol. Patient denies any fever, chills, chest pain, shortness of breath or calf pain. The remainder of the review of systems in negative. There are no new illness or injuries to report since last seen in the office. There are no changes to medications, allergies, family or social history. Pain Assessment  2/19/2020   Location of Pain Shoulder   Location Modifiers Right   Severity of Pain 7   Quality of Pain Aching;Dull   Duration of Pain Persistent   Frequency of Pain Constant   Aggravating Factors Bending   Aggravating Factors Comment range motion   Relieving Factors Nothing   Result of Injury No     PHYSICAL EXAM:   Visit Vitals  /70 (BP 1 Location: Left arm, BP Patient Position: Sitting)   Pulse 80   Temp 97.6 °F (36.4 °C) (Oral)   Resp 15   Ht 5' 7\" (1.702 m)   Wt 133 lb 6.4 oz (60.5 kg)   SpO2 99%   BMI 20.89 kg/m²     The patient is a well-developed, well-nourished female   in no acute distress. The patient is alert and oriented times three. The patient is alert and oriented times three. Mood and affect are normal.  LYMPHATIC: lymph nodes are not enlarged and are within normal limits  SKIN: normal in color and non tender to palpation. There are no bruises or abrasions noted.    NEUROLOGICAL: Motor sensory exam is within normal limits. Reflexes are equal bilaterally. There is normal sensation to pinprick and light touch  MUSCULOSKELETAL:  Examination Right shoulder   Skin Intact   AC joint tenderness -   Biceps tenderness +   Forward flexion/Elevation    Active abduction    Glenohumeral abduction 90   External rotation ROM 60   Internal rotation ROM 50   Apprehension -   Morelias Relocation -   Jerk -   Load and Shift -   Obriens -   Speeds -   Impingement sign +   Supraspinatus/Empty Can +   External Rotation Strength -, 5/5   Lift Off/Belly Press -, 5/5   Neurovascular Intact      IMAGING: 3 view xray images of right  shoulder taken in office on 1/13/2020 read and reviewed by myself reveal subacromial spur. No acute abnormalities     IMPRESSION:      ICD-10-CM ICD-9-CM    1. Nontraumatic tear of right rotator cuff, unspecified tear extent M75.101 727.61 MRI SHOULDER RT WO CONT      celecoxib (CELEBREX) 200 mg capsule        PLAN:   1. Patient presents with right shoulder pain that has increased after the cortisone injection given at last OV. I am ordering an MRI to r/o a rotator cuff tear and she was given a prescription for Celebrex today. She can continue with all activities as tolerated. Risk factors include: n/a  2. No ultrasound exam indicated today  3. No cortisone injection indicated today   4. No Physical/Occupational Therapy indicated today  5. Yes diagnostic test indicated today: MRI R SHOULDER  6. No durable medical equipment indicated today  7. No referral indicated today   8. Yes medications indicated today: CELEBREX  9.  No Narcotic indicated today       RTC following MRI      Scribed by [address]) as dictated by JUNIOR Hernandez PA-C [facility] [phone] and Spine Specialist

## 2020-02-26 ENCOUNTER — HOSPITAL ENCOUNTER (OUTPATIENT)
Age: 65
Discharge: HOME | End: 2020-02-26
Attending: PHYSICIAN ASSISTANT
Payer: MEDICARE

## 2020-02-26 ENCOUNTER — TELEPHONE (OUTPATIENT)
Dept: ORTHOPEDIC SURGERY | Age: 65
End: 2020-02-26

## 2020-02-26 DIAGNOSIS — M75.101 NONTRAUMATIC TEAR OF RIGHT ROTATOR CUFF, UNSPECIFIED TEAR EXTENT: ICD-10-CM

## 2020-02-26 PROCEDURE — 73221 MRI JOINT UPR EXTREM W/O DYE: CPT

## 2020-02-26 NOTE — TELEPHONE ENCOUNTER
Patient had her MRI done this morning and called to schedule a follow up. There is nothing until 03/09/20. Is there somewhere she can be fit in sooner? Please advise.     Patient [phone]

## 2020-03-03 ENCOUNTER — OFFICE VISIT (OUTPATIENT)
Dept: ORTHOPEDIC SURGERY | Age: 65
End: 2020-03-03

## 2020-03-03 VITALS
TEMPERATURE: 97.9 F | SYSTOLIC BLOOD PRESSURE: 125 MMHG | BODY MASS INDEX: 21.12 KG/M2 | HEART RATE: 70 BPM | WEIGHT: 134.6 LBS | DIASTOLIC BLOOD PRESSURE: 67 MMHG | OXYGEN SATURATION: 99 % | HEIGHT: 67 IN | RESPIRATION RATE: 16 BRPM

## 2020-03-03 DIAGNOSIS — M75.121 NONTRAUMATIC COMPLETE TEAR OF RIGHT ROTATOR CUFF: Primary | ICD-10-CM

## 2020-03-03 NOTE — PROGRESS NOTES
Hussain Holliday  1955   Chief Complaint   Patient presents with    Shoulder Pain     Right shoulder pain         HISTORY OF PRESENT ILLNESS  Hussain Holliday is a 77 y.o. female who presents today for reevaluation of right shoulder pain and MRI review. Patient rates pain as 7/10 today. She notes pain started in December. No injury. Gradually worsening. Worse when she reaches overhead and lifts. She notes prior surgery on her right biceps tendon at [address] about 18 years ago. The patient had a cortisone injection on 1/13/2020 which provided minimal relief. She notes pain at night with laying on her right side. Pt reports a popping sensation and limited ROM with lifting the arm. Has been taking Celebrex with minimal relief. Surgery was discussed with the patient today and they would like to move forward with scheduling surgery. Patient denies any fever, chills, chest pain, shortness of breath or calf pain. The remainder of the review of systems is negative. There are no new illness or injuries to report since last seen in the office. There are no changes to medications, allergies, family or social history. Pain Assessment  3/3/2020   Location of Pain Shoulder   Location Modifiers Right   Severity of Pain 7   Quality of Pain Throbbing   Duration of Pain Persistent   Frequency of Pain Constant   Aggravating Factors (No Data)   Aggravating Factors Comment Using the arm a lot    Relieving Factors Rest   Relieving Factors Comment Pain medication    Result of Injury No     PHYSICAL EXAM:   Visit Vitals  /67   Pulse 70   Temp 97.9 °F (36.6 °C) (Oral)   Resp 16   Ht 5' 7\" (1.702 m)   Wt 134 lb 9.6 oz (61.1 kg)   SpO2 99%   BMI 21.08 kg/m²     The patient is a well-developed, well-nourished female   in no acute distress. The patient is alert and oriented times three. The patient is alert and oriented times three.  Mood and affect are normal.  LYMPHATIC: lymph nodes are not enlarged and are within normal limits  SKIN: normal in color and non tender to palpation. There are no bruises or abrasions noted. NEUROLOGICAL: Motor sensory exam is within normal limits. Reflexes are equal bilaterally. There is normal sensation to pinprick and light touch  MUSCULOSKELETAL:  Examination Right shoulder   Skin Intact   AC joint tenderness -   Biceps tenderness +   Forward flexion/Elevation    Active abduction    Glenohumeral abduction 90   External rotation ROM 60   Internal rotation ROM 50   Apprehension -   Morelias Relocation -   Jerk -   Load and Shift -   Obriens -   Speeds -   Impingement sign +   Supraspinatus/Empty Can +   External Rotation Strength -, 5/5   Lift Off/Belly Press -, 5/5   Neurovascular Intact        IMAGING: MRI of right shoulder dated 2/26/2020 reviewed and read by Dr. Pepe Rutledge:  IMPRESSION:  1. Thin obliquely are oriented full thickness tear involving the supraspinatus and infraspinatus tendons at the enthesis. Low-grade infraspinatus myotendinous junction tear. Mild supraspinatus and infraspinatus fatty infiltration. 2.  Subscapularis articular sided fraying, involving the superior third fibers. 3.  Prior biceps tenotomy. 4.  Findings which may predispose to subacromial impingement. 5.  Moderate sized inferior directed distal clavicle osteophyte and coracoacromial ligament thickening. 6.  Moderate glenohumeral osteoarthritis with multiple intra-articular bodies in the inferior joint and subscapularis recess. Degenerative SLAP tear. 3 view xray images of right  shoulder taken in office on 1/13/2020 read and reviewed by JACQUELINE Harris, reveal: subacromial spur. No acute abnormalities     IMPRESSION:      ICD-10-CM ICD-9-CM    1. Nontraumatic complete tear of right rotator cuff M75.121 727.61         PLAN:   1. I discussed the risks and benefits and potential adverse outcomes of both operative vs non operative treatment of right massive rotator cuff tear with the patient. Patient wishes to proceed with arthroscopic right massive rotator cuff repair. Risks of operative intervention include but not limited to bleeding, infection, deep vein thrombosis, pulmonary embolism, death, limb length discrepancy, reflexive sympathetic dystrophy, fat embolism syndrome,damage to blood vessels and nerves, malunion, non-union, delayed union, failure of hardware, post traumatic arthritis, stroke, heart attack, and death. Patient understands that infection may arise and may require numerous surgeries. History and physical exam scheduled for a later date. Risk factors include: n/a  2. No ultrasound exam indicated today  3. No cortisone injection indicated today   4. No Physical/Occupational Therapy indicated today  5. No diagnostic test indicated today:   6. No durable medical equipment indicated today  7. No referral indicated today   8. No medications indicated today:   9. No Narcotic indicated today        RTC H&P      Scribed by Ephraim Cummings) as dictated by Andressa Arce MD    I, Dr. Andressa Arce, confirm that all documentation is accurate.     Andressa Arce M.D.   [facility]

## 2020-03-05 ENCOUNTER — TELEPHONE (OUTPATIENT)
Dept: ORTHOPEDIC SURGERY | Age: 65
End: 2020-03-05

## 2020-03-05 RX ORDER — MELOXICAM 15 MG/1
15 TABLET ORAL
Qty: 30 TAB | Refills: 2 | Status: SHIPPED | OUTPATIENT
Start: 2020-03-05

## 2020-03-05 NOTE — TELEPHONE ENCOUNTER
Patient said she is taking the Celebrex now and is willing to try the Mobic. She understands she can not take both at the same time.

## 2020-03-05 NOTE — TELEPHONE ENCOUNTER
Patient is requesting a new medication for shoulder pain while she's awaiting surgery. Please call in to [facility] on file and advise patient once done at [phone].

## 2020-03-05 NOTE — TELEPHONE ENCOUNTER
Patient made aware that Mobic was sent to pharmacy and to not take the celebrex with it.  She understands

## 2020-03-05 NOTE — TELEPHONE ENCOUNTER
We can do something like mobic but it will need to be stopped 5 days prior to surgery.  Or we can do voltaren gel

## 2020-03-06 DIAGNOSIS — Z01.818 PREOP EXAMINATION: Primary | ICD-10-CM

## 2020-03-09 ENCOUNTER — HOSPITAL ENCOUNTER (OUTPATIENT)
Dept: LAB | Age: 65
Discharge: HOME | End: 2020-03-09
Payer: MEDICARE

## 2020-03-09 ENCOUNTER — TELEPHONE (OUTPATIENT)
Dept: ORTHOPEDIC SURGERY | Age: 65
End: 2020-03-09

## 2020-03-09 DIAGNOSIS — M75.121 NONTRAUMATIC COMPLETE TEAR OF RIGHT ROTATOR CUFF: Primary | ICD-10-CM

## 2020-03-09 DIAGNOSIS — Z01.818 PREOP EXAMINATION: ICD-10-CM

## 2020-03-09 LAB
ANION GAP SERPL CALC-SCNC: 5 MMOL/L (ref 3–18)
ATRIAL RATE: 66 BPM
BASOPHILS # BLD: 0.1 K/UL (ref 0–0.1)
BASOPHILS NFR BLD: 1 % (ref 0–2)
BUN SERPL-MCNC: 19 MG/DL (ref 7–18)
BUN/CREAT SERPL: 28 (ref 12–20)
CALCIUM SERPL-MCNC: 9.2 MG/DL (ref 8.5–10.1)
CALCULATED P AXIS, ECG09: 61 DEGREES
CALCULATED R AXIS, ECG10: 77 DEGREES
CALCULATED T AXIS, ECG11: 60 DEGREES
CHLORIDE SERPL-SCNC: 104 MMOL/L (ref 100–111)
CO2 SERPL-SCNC: 28 MMOL/L (ref 21–32)
CREAT SERPL-MCNC: 0.68 MG/DL (ref 0.6–1.3)
DIAGNOSIS, 93000: NORMAL
DIFFERENTIAL METHOD BLD: ABNORMAL
EOSINOPHIL # BLD: 0.2 K/UL (ref 0–0.4)
EOSINOPHIL NFR BLD: 2 % (ref 0–5)
ERYTHROCYTE [DISTWIDTH] IN BLOOD BY AUTOMATED COUNT: 15.5 % (ref 11.6–14.5)
GLUCOSE SERPL-MCNC: 82 MG/DL (ref 74–99)
HCT VFR BLD AUTO: 43.4 % (ref 35–45)
HGB BLD-MCNC: 14.2 G/DL (ref 12–16)
LYMPHOCYTES # BLD: 2.2 K/UL (ref 0.9–3.6)
LYMPHOCYTES NFR BLD: 25 % (ref 21–52)
MCH RBC QN AUTO: 27.9 PG (ref 24–34)
MCHC RBC AUTO-ENTMCNC: 32.7 G/DL (ref 31–37)
MCV RBC AUTO: 85.3 FL (ref 74–97)
MONOCYTES # BLD: 1.1 K/UL (ref 0.05–1.2)
MONOCYTES NFR BLD: 13 % (ref 3–10)
NEUTS SEG # BLD: 5.2 K/UL (ref 1.8–8)
NEUTS SEG NFR BLD: 59 % (ref 40–73)
P-R INTERVAL, ECG05: 164 MS
PLATELET # BLD AUTO: 304 K/UL (ref 135–420)
PMV BLD AUTO: 9.9 FL (ref 9.2–11.8)
POTASSIUM SERPL-SCNC: 4.2 MMOL/L (ref 3.5–5.5)
Q-T INTERVAL, ECG07: 382 MS
QRS DURATION, ECG06: 92 MS
QTC CALCULATION (BEZET), ECG08: 400 MS
RBC # BLD AUTO: 5.09 M/UL (ref 4.2–5.3)
SODIUM SERPL-SCNC: 137 MMOL/L (ref 136–145)
VENTRICULAR RATE, ECG03: 66 BPM
WBC # BLD AUTO: 8.8 K/UL (ref 4.6–13.2)

## 2020-03-09 PROCEDURE — 85025 COMPLETE CBC W/AUTO DIFF WBC: CPT

## 2020-03-09 PROCEDURE — 93005 ELECTROCARDIOGRAM TRACING: CPT

## 2020-03-09 PROCEDURE — 80048 BASIC METABOLIC PNL TOTAL CA: CPT

## 2020-03-09 PROCEDURE — 36415 COLL VENOUS BLD VENIPUNCTURE: CPT

## 2020-03-09 NOTE — TELEPHONE ENCOUNTER
DANIEL   I received a CC message from Cody Gutierrez at [facility] [employer] [facility] [employer] that stated:  \"I contacted Mrs. Bates to schedule post-op Physical Therapy for Tuesday March 24, 2020 per the instructions on the referral. However, Mrs. Isabell Up has declined to schedule until after she has spoken with the physician at her appointment on March 17, 2020. The patient has concerns regarding getting back and forth to therapy as she will not be able to drive and her  is medically not allowed to drive. Advised patient to contact her Medicare Insurance to inquire about transportation options or [employer], to utilize while she is unable to drive. \"

## 2020-03-10 NOTE — TELEPHONE ENCOUNTER
Please let patient know that I will discuss this with her at her H&P. We do recommend going occasional formal PT sessions to get instructed on HEP.  We can work with her schedule but without formal PT her success rate will not be great

## 2020-03-10 NOTE — TELEPHONE ENCOUNTER
Patient notified, per [facility] message that PT directions/sessions will be discussed at [facility] &P appointment next week.

## 2020-03-16 ENCOUNTER — TELEPHONE (OUTPATIENT)
Dept: ORTHOPEDIC SURGERY | Age: 65
End: 2020-03-16

## 2020-03-17 ENCOUNTER — OFFICE VISIT (OUTPATIENT)
Dept: ORTHOPEDIC SURGERY | Age: 65
End: 2020-03-17

## 2020-03-17 VITALS
HEART RATE: 70 BPM | SYSTOLIC BLOOD PRESSURE: 126 MMHG | DIASTOLIC BLOOD PRESSURE: 69 MMHG | WEIGHT: 133 LBS | HEIGHT: 67 IN | OXYGEN SATURATION: 99 % | BODY MASS INDEX: 20.88 KG/M2 | RESPIRATION RATE: 15 BRPM | TEMPERATURE: 96.7 F

## 2020-03-17 DIAGNOSIS — M75.121 NONTRAUMATIC COMPLETE TEAR OF RIGHT ROTATOR CUFF: Primary | ICD-10-CM

## 2020-03-17 RX ORDER — GABAPENTIN 300 MG/1
300 CAPSULE ORAL
Qty: 5 CAP | Refills: 0 | Status: SHIPPED | OUTPATIENT
Start: 2020-03-17 | End: 2020-03-22

## 2020-03-17 RX ORDER — OXYCODONE HYDROCHLORIDE 5 MG/1
5 TABLET ORAL
Qty: 40 TAB | Refills: 0 | Status: SHIPPED | OUTPATIENT
Start: 2020-03-17 | End: 2020-03-24

## 2020-03-17 NOTE — PATIENT INSTRUCTIONS
Pendulum swing   If you have pain in your back, do not do this exercise. 1. Hold on to a table or the back of a chair with your good arm. Then bend forward a little and let your sore arm hang straight down. This exercise does not use the arm muscles. Rather, use your legs and your hips to create movement that makes your arm swing freely. 2. Use the movement from your hips and legs to guide the slightly swinging arm back and forth like a pendulum (or elephant trunk). Then guide it in circles that start small (about the size of a dinner plate). Make the circles a bit larger each day, as your pain allows. 3. Do this exercise for 5 minutes, 5 to 7 times each day. 4. As you have less pain, try bending over a little farther to do this exercise. This will increase the amount of movement at your shoulder. Dr. Ashley Lighter Repair    What is the surgery? - This is an outpatient procedure at either [place] [address] or [address] [place] [address] Rd will be completely asleep for the procedure. Dr. Cherri Castillo will make somewhere between 2-5 small incisions in your shoulder depending on the amount of work to be completed. He will take a tour of your shoulder with the camera and fix everything that needs to be fixed during your surgery. - Total surgery takes about 45 mins to an hour and half depending on how much needed to be repaired    What can you expect after surgery? - You will have a bulky dressing on your shoulder that you can remove 2 days after surgery. You will be able to shower 2 days after surgery but no soaking in a bath, hot tub, ocean or pool x 2 weeks to allow for full wound healing  - You will be in a sling for 5-6 weeks depending on your repair. You will wear this sling whenever you are active, up moving around, and sleeping at night. This sling is to keep you from moving your arm on your own. You are essentially one armed until you are out of your sling.  This means no reaching, pulling, grabbing or lifting with the operative arm.   - Dr. Mansi Rene will start physical therapy for you one day after surgery. While you cannot move your arm we allow physical therapy to gently move your shoulder. We call this passive range of motion. The goal of this is to decrease your stiffness and in turn decrease your post operative pain. - Plan on being in physical therapy for 10-12 weeks    When can I return to work? - Most patients return to desk work only after 2 weeks. You are able to type but there is no overhead work or lifting  - You will start some gentle lifting up to 5-10lbs at about 8-10 weeks post operatively. You will gradually increase how much you are able to lift after this point under the guidance of Dr. Mansi Rene, his physician assistant and physical therapy. - At 6 months you are able to do all activities as tolerated but it may take you a full 9-12 months to fully recover from your surgery    Not all shoulder arthroscopies are the same. The specifics of your individual case will be discussed at length with you by Dr. Mansi Rene and his Physician Assistant. Boni Morin  Surgical Coordinator  [facility] Daniel. Manuel.  [address], [facility] [email or web address]  P: [phone]  F: [phone]

## 2020-03-17 NOTE — PROGRESS NOTES
HISTORY AND PHYSICAL          Patient: Annalee West                MRN: [id number]       SSN: [id number]  YOB: 1955          AGE: 77 y.o. SEX: female      Patient scheduled for:  Right shoulder arthroscopic rotator cuff repair    Surgeon: Ronald Leos MD    ANESTHESIA TYPE:  General    HISTORY:     The patient was seen in the office today for a preoperative history and physical for an upcoming above listed surgery. The patient is a pleasant 77 y.o. female who has a history of right shoulder pain since December. No injury. Gradually worsening. Worse when she reaches overhead and lifts. She notes prior surgery on her right biceps tendon at [address] [employer] about 18 years ago. The patient had a cortisone injection on 1/13/2020 which provided minimal relief. She notes pain at night with laying on her right side. Pt reports a popping sensation and limited ROM with lifting the arm. Has been taking Celebrex with minimal relief. Pain level is a 7/10. Due to the current findings, affected activity of daily living and continued pain and discomfort, surgical intervention is indicated. The alternatives, risks, and complications, including but not limited to infection, blood loss, need for blood transfusion, neurovascular damage, stefan-incisional numbness, subcutaneous hematoma, bone fracture, anesthetic complications, DVT, PE, death, RSD, postoperative stiffness and pain, possible surgical scar, delayed healing and nonhealing, reflexive sympathetic dystrophy, damage to blood vessels and nerves, need for more surgery, MI, and stroke,  failure of hardware, gait disturbances,have been discussed. The patient understands and wishes to proceed with surgery.      PAST MEDICAL HISTORY:     Past Medical History:   Diagnosis Date    Arthritis        CURRENT MEDICATIONS:     Current Outpatient Medications   Medication Sig Dispense Refill    gabapentin (NEURONTIN) 300 mg capsule Take 1 Cap by mouth nightly for 5 days. Max Daily Amount: 300 mg. START NIGHT OF SURGERY 5 Cap 0    oxyCODONE IR (ROXICODONE) 5 mg immediate release tablet Take 1 Tab by mouth every four (4) hours as needed for Pain for up to 7 days. Max Daily Amount: 30 mg. DO NOT TAKE UNTIL AFTER SURGERY (for acute post operative pain) 40 Tab 0    meloxicam (MOBIC) 15 mg tablet Take 1 Tab by mouth daily (with breakfast). 30 Tab 2    pantoprazole (PROTONIX) 40 mg tablet Take 40 mg by mouth daily.  cetirizine (ZYRTEC) 10 mg tablet Take  by mouth.  aspirin delayed-release 81 mg tablet Take  by mouth daily.  Calcium-Cholecalciferol, D3, (CALCIUM 600 WITH VITAMIN D3) 600 mg(1,500mg) -400 unit cap Take  by mouth.  fluticasone propionate (FLONASE) 50 mcg/actuation nasal spray 2 Sprays by Both Nostrils route daily.  celecoxib (CELEBREX) 200 mg capsule Take 1 Cap by mouth two (2) times a day. 60 Cap 0       ALLERGIES:     No Known Allergies      SURGICAL HISTORY:     Past Surgical History:   Procedure Laterality Date    HAND/FINGER SURGERY UNLISTED      HX  SECTION         SOCIAL HISTORY:     Social History     Socioeconomic History    Marital status: UNKNOWN     Spouse name: Not on file    Number of children: Not on file    Years of education: Not on file    Highest education level: Not on file   Tobacco Use    Smoking status: Current Every Day Smoker    Smokeless tobacco: Never Used       FAMILY HISTORY:     Family History   Problem Relation Age of Onset    Arthritis-osteo Mother     Arthritis-osteo Father        REVIEW OF SYSTEMS:     Negative for fevers, chills, chest pain, shortness of breath, weight loss, recent illness     General: Negative for fever and chills. No unexpected change in weight. Denies fatigue. No change in appetite. Skin: Negative for rash or itching. HEENT: Negative for congestion, sore throat, neck pain and neck stiffness. No change in vision or hearing.  Hasn't noted any enlarged lymph nodes in the neck. Cardiovascular:  Negative for chest pain and palpitations. Has not noted pedal edema. Respiratory: Negative for cough, colds, sinus, hemoptysis, shortness of breath and wheezing. Gastrointestinal: Negative for nausea and vomiting, rectal bleeding, coffee ground emesis, abdominal pain, diarrhea and constipation. Genitourinary: Negative for dysuria, frequency urgency, or burning on micturition. No flank pain, no foul smelling urine, no difficulty with initiating urination. Hematological: Negative for bleeding or easy bruising. Musculoskeletal: Negative  for arthralgias, back pain or neck pain. Neurological: Negative for dizziness, seizures or syncopal episodes. Denies headaches. Endocrine: Denies excessive thirst.  No heat/cold intolerance. Psychiatric: Negative for depression or insomnia. PHYSICAL EXAMINATION:     VITALS:   Visit Vitals  /69 (BP 1 Location: Left arm, BP Patient Position: Sitting)   Pulse 70   Temp 96.7 °F (35.9 °C) (Oral)   Resp 15   Ht 5' 7\" (1.702 m)   Wt 133 lb (60.3 kg)   SpO2 99%   BMI 20.83 kg/m²     GEN:  Well developed, well nourished 77 y.o. female in no acute distress. HEENT: Normocephalic and atraumatic. Eyes: Conjunctivae and EOM are normal.Pupils are equal, round, and reactive to light. External ear normal appearance, external nose normal appearing. Mouth/Throat: Oropharynx is clear and moist, able to handle oral secretions w/out difficulty, airway patent  NECK: Supple. Normal ROM, No lymphadenopathy. Trachea is midline. No bruising, swelling or deformity  RESP: Clear to auscultation bilaterally. No wheezes, rales, rhonchi. Normal effort and breath sounds. No respiratory distress  CARDIO:  Normal rate, regular rhythm and normal heart sounds. No MGR. ABDOMEN: Soft, non-tender, non-distended, normoactive bowel sounds in all four quadrants. There is no tenderness. There is no rebound and no guarding.    BACK: No CVA or spinal tenderness  BREAST:  Deferred  PELVIC:    Deferred   RECTAL:  Deferred   :           Deferred  EXTREMITIES: EXAMINATION OF:   Examination Right shoulder   Skin Intact   AC joint tenderness -   Biceps tenderness +   Forward flexion/Elevation    Active abduction    Glenohumeral abduction 90   External rotation ROM 60   Internal rotation ROM 50   Apprehension -   Morelias Relocation -   Jerk -   Load and Shift -   Obriens -   Speeds -   Impingement sign +   Supraspinatus/Empty Can +   External Rotation Strength -, 5/5   Lift Off/Belly Press -, 5/5   Neurovascular Intact        NEUROVASCULAR: Sensation intact to light touch and strength grossly intact and symmetrical. No nystagmus. Positive distal pulses and capillary refill. DVT ASSESSMENT:  There is not  calf tenderness. No evidence of DVT seen on physical exam.  MOTOR: In tact  PSYCH: Alert an oriented to person, place and time. Mood, memory, affect, behavior and judgment normal       RADIOGRAPHS & DIAGNOSTIC STUDIES:     MRI/xray reveals :   IMAGING: MRI of right shoulder dated 2/26/2020 reviewed and read by Dr. Lenny Lombard:  IMPRESSION:  1.  Thin obliquely are oriented full thickness tear involving the supraspinatus and infraspinatus tendons at the enthesis. Low-grade infraspinatus myotendinous junction tear. Mild supraspinatus and infraspinatus fatty infiltration. 2.  Subscapularis articular sided fraying, involving the superior third fibers. 3.  Prior biceps tenotomy. 4.  Findings which may predispose to subacromial impingement. 5.  Moderate sized inferior directed distal clavicle osteophyte and coracoacromial ligament thickening. 6.  Moderate glenohumeral osteoarthritis with multiple intra-articular bodies in the inferior joint and subscapularis recess. Degenerative SLAP tear.     3 view xray images of right  shoulder taken in office on 1/13/2020 read and reviewed by JACQUELINE Banuelos, reveal: subacromial spur.  No acute abnormalities         LABS:       @  CBC:   Lab Results   Component Value Date/Time    WBC 8.8 03/09/2020 11:18 AM    RBC 5.09 03/09/2020 11:18 AM    HGB 14.2 03/09/2020 11:18 AM    HCT 43.4 03/09/2020 11:18 AM    PLATELET 094 91/48/6357 11:18 AM    and BMP:   Lab Results   Component Value Date/Time    Glucose 82 03/09/2020 11:18 AM    Sodium 137 03/09/2020 11:18 AM    Potassium 4.2 03/09/2020 11:18 AM    Chloride 104 03/09/2020 11:18 AM    CO2 28 03/09/2020 11:18 AM    BUN 19 (H) 03/09/2020 11:18 AM    Creatinine 0.68 03/09/2020 11:18 AM    Calcium 9.2 03/09/2020 11:18 AM   @    Preoperative labs were reviewed and are substantially within normal limits   EKG: Normal sinus rhythm   Possible Left atrial enlargement   Borderline ECG   No previous ECGs available   Confirmed by Patrick Dean ([id number]) on 3/9/2020 7:49:35 PM       ASSESSMENT:       Encounter Diagnosis   Name Primary?  Nontraumatic complete tear of right rotator cuff Yes       PLAN:     Again, the alternatives, risks, and complications, as well as expected outcome were discussed. The patient understands and agrees to proceed with right shoulder arthroscopic massive rotator cuff repair.  Patient given orders listed below:    Orders Placed This Encounter    gabapentin (NEURONTIN) 300 mg capsule    oxyCODONE IR (ROXICODONE) 5 mg immediate release tablet         Tricia Gtz PA-C  3/17/2020  9:18 AM

## 2020-03-24 ENCOUNTER — APPOINTMENT (OUTPATIENT)
Dept: PHYSICAL THERAPY | Age: 65
End: 2020-03-24

## 2020-03-30 ENCOUNTER — APPOINTMENT (OUTPATIENT)
Dept: PHYSICAL THERAPY | Age: 65
End: 2020-03-30

## 2020-04-01 ENCOUNTER — APPOINTMENT (OUTPATIENT)
Dept: PHYSICAL THERAPY | Age: 65
End: 2020-04-01

## 2020-04-06 ENCOUNTER — TELEPHONE (OUTPATIENT)
Dept: ORTHOPEDIC SURGERY | Age: 65
End: 2020-04-06

## 2020-04-06 ENCOUNTER — APPOINTMENT (OUTPATIENT)
Dept: PHYSICAL THERAPY | Age: 65
End: 2020-04-06

## 2020-04-06 NOTE — TELEPHONE ENCOUNTER
Spoke with patient. Will wear sling intermittently. If she cannot obtain from drug store she will come here to get sling. Also discussed her surgery with her today. Patient would now like to proceed with right reverse total shoulder arthroplasty. New order filled out today.

## 2020-04-06 NOTE — TELEPHONE ENCOUNTER
Patient is requesting a call back to discuss if she's able to wear a sling to relieve her right shoulder pain. Please advise patient at [phone].

## 2020-04-08 ENCOUNTER — APPOINTMENT (OUTPATIENT)
Dept: PHYSICAL THERAPY | Age: 65
End: 2020-04-08

## 2020-04-13 ENCOUNTER — APPOINTMENT (OUTPATIENT)
Dept: PHYSICAL THERAPY | Age: 65
End: 2020-04-13

## 2020-04-15 ENCOUNTER — APPOINTMENT (OUTPATIENT)
Dept: PHYSICAL THERAPY | Age: 65
End: 2020-04-15

## 2020-04-20 ENCOUNTER — APPOINTMENT (OUTPATIENT)
Dept: PHYSICAL THERAPY | Age: 65
End: 2020-04-20

## 2020-04-22 ENCOUNTER — APPOINTMENT (OUTPATIENT)
Dept: PHYSICAL THERAPY | Age: 65
End: 2020-04-22

## 2020-04-27 ENCOUNTER — APPOINTMENT (OUTPATIENT)
Dept: PHYSICAL THERAPY | Age: 65
End: 2020-04-27

## 2020-04-29 ENCOUNTER — TELEPHONE (OUTPATIENT)
Dept: ORTHOPEDIC SURGERY | Age: 65
End: 2020-04-29

## 2020-04-29 ENCOUNTER — APPOINTMENT (OUTPATIENT)
Dept: PHYSICAL THERAPY | Age: 65
End: 2020-04-29

## 2020-04-29 NOTE — TELEPHONE ENCOUNTER
Patient called wishing to speak to [facility] regarding her possible surgery for May 4th. I did advise her that the surgery scheduler is the one who would know if the surgery is still on or not, she completely disregarded the information and asked for SM to call her.  Please advise patient at [phone]

## 2020-05-12 ENCOUNTER — HOSPITAL ENCOUNTER (OUTPATIENT)
Dept: GENERAL RADIOLOGY | Age: 65
Discharge: HOME | DRG: 483 | End: 2020-05-12
Payer: MEDICARE

## 2020-05-12 ENCOUNTER — TELEPHONE (OUTPATIENT)
Dept: SURGERY | Age: 65
End: 2020-05-12

## 2020-05-12 ENCOUNTER — HOSPITAL ENCOUNTER (OUTPATIENT)
Dept: LAB | Age: 65
Discharge: HOME | DRG: 483 | End: 2020-05-12
Payer: MEDICARE

## 2020-05-12 DIAGNOSIS — M75.121 NONTRAUMATIC COMPLETE TEAR OF RIGHT ROTATOR CUFF: ICD-10-CM

## 2020-05-12 DIAGNOSIS — Z01.818 PREOP EXAMINATION: ICD-10-CM

## 2020-05-12 DIAGNOSIS — Z01.818 PREOP EXAMINATION: Primary | ICD-10-CM

## 2020-05-12 LAB
ABO + RH BLD: NORMAL
ANION GAP SERPL CALC-SCNC: 3 MMOL/L (ref 3–18)
APPEARANCE UR: CLEAR
APTT PPP: 35.6 SEC (ref 23–36.4)
ATRIAL RATE: 69 BPM
BASOPHILS # BLD: 0.1 K/UL (ref 0–0.1)
BASOPHILS NFR BLD: 1 % (ref 0–2)
BILIRUB UR QL: NEGATIVE
BLOOD GROUP ANTIBODIES SERPL: NORMAL
BUN SERPL-MCNC: 19 MG/DL (ref 7–18)
BUN/CREAT SERPL: 27 (ref 12–20)
CALCIUM SERPL-MCNC: 8.3 MG/DL (ref 8.5–10.1)
CALCULATED P AXIS, ECG09: 68 DEGREES
CALCULATED R AXIS, ECG10: 77 DEGREES
CALCULATED T AXIS, ECG11: 26 DEGREES
CHLORIDE SERPL-SCNC: 106 MMOL/L (ref 100–111)
CO2 SERPL-SCNC: 29 MMOL/L (ref 21–32)
COLOR UR: YELLOW
CREAT SERPL-MCNC: 0.7 MG/DL (ref 0.6–1.3)
DIAGNOSIS, 93000: NORMAL
DIFFERENTIAL METHOD BLD: ABNORMAL
EOSINOPHIL # BLD: 0.2 K/UL (ref 0–0.4)
EOSINOPHIL NFR BLD: 2 % (ref 0–5)
EPITH CASTS URNS QL MICRO: NORMAL /LPF (ref 0–5)
ERYTHROCYTE [DISTWIDTH] IN BLOOD BY AUTOMATED COUNT: 14.4 % (ref 11.6–14.5)
GLUCOSE SERPL-MCNC: 81 MG/DL (ref 74–99)
GLUCOSE UR STRIP.AUTO-MCNC: NEGATIVE MG/DL
HCT VFR BLD AUTO: 43.6 % (ref 35–45)
HGB BLD-MCNC: 14.3 G/DL (ref 12–16)
HGB UR QL STRIP: ABNORMAL
INR PPP: 1 (ref 0.8–1.2)
KETONES UR QL STRIP.AUTO: NEGATIVE MG/DL
LEUKOCYTE ESTERASE UR QL STRIP.AUTO: NEGATIVE
LYMPHOCYTES # BLD: 2.6 K/UL (ref 0.9–3.6)
LYMPHOCYTES NFR BLD: 24 % (ref 21–52)
MCH RBC QN AUTO: 28.1 PG (ref 24–34)
MCHC RBC AUTO-ENTMCNC: 32.8 G/DL (ref 31–37)
MCV RBC AUTO: 85.7 FL (ref 74–97)
MONOCYTES # BLD: 1.3 K/UL (ref 0.05–1.2)
MONOCYTES NFR BLD: 12 % (ref 3–10)
NEUTS SEG # BLD: 6.4 K/UL (ref 1.8–8)
NEUTS SEG NFR BLD: 61 % (ref 40–73)
NITRITE UR QL STRIP.AUTO: NEGATIVE
P-R INTERVAL, ECG05: 158 MS
PH UR STRIP: 5 [PH] (ref 5–8)
PLATELET # BLD AUTO: 308 K/UL (ref 135–420)
PMV BLD AUTO: 10 FL (ref 9.2–11.8)
POTASSIUM SERPL-SCNC: 4 MMOL/L (ref 3.5–5.5)
PROT UR STRIP-MCNC: NEGATIVE MG/DL
PROTHROMBIN TIME: 13.2 SEC (ref 11.5–15.2)
Q-T INTERVAL, ECG07: 386 MS
QRS DURATION, ECG06: 100 MS
QTC CALCULATION (BEZET), ECG08: 413 MS
RBC # BLD AUTO: 5.09 M/UL (ref 4.2–5.3)
RBC #/AREA URNS HPF: NORMAL /HPF (ref 0–5)
SODIUM SERPL-SCNC: 138 MMOL/L (ref 136–145)
SP GR UR REFRACTOMETRY: 1.01 (ref 1–1.03)
SPECIMEN EXP DATE BLD: NORMAL
UROBILINOGEN UR QL STRIP.AUTO: 0.2 EU/DL (ref 0.2–1)
VENTRICULAR RATE, ECG03: 69 BPM
WBC # BLD AUTO: 10.4 K/UL (ref 4.6–13.2)
WBC URNS QL MICRO: NORMAL /HPF (ref 0–4)

## 2020-05-12 PROCEDURE — 71046 X-RAY EXAM CHEST 2 VIEWS: CPT

## 2020-05-12 PROCEDURE — 85610 PROTHROMBIN TIME: CPT

## 2020-05-12 PROCEDURE — 81001 URINALYSIS AUTO W/SCOPE: CPT

## 2020-05-12 PROCEDURE — 87635 SARS-COV-2 COVID-19 AMP PRB: CPT

## 2020-05-12 PROCEDURE — 85025 COMPLETE CBC W/AUTO DIFF WBC: CPT

## 2020-05-12 PROCEDURE — 80048 BASIC METABOLIC PNL TOTAL CA: CPT

## 2020-05-12 PROCEDURE — 85730 THROMBOPLASTIN TIME PARTIAL: CPT

## 2020-05-12 PROCEDURE — 36415 COLL VENOUS BLD VENIPUNCTURE: CPT

## 2020-05-12 PROCEDURE — 86900 BLOOD TYPING SEROLOGIC ABO: CPT

## 2020-05-12 PROCEDURE — 93005 ELECTROCARDIOGRAM TRACING: CPT

## 2020-05-12 RX ORDER — HYOSCYAMINE SULFATE 0.12 MG/1
0.12 TABLET SUBLINGUAL
COMMUNITY

## 2020-05-13 DIAGNOSIS — M75.121 NONTRAUMATIC COMPLETE TEAR OF RIGHT ROTATOR CUFF: Primary | ICD-10-CM

## 2020-05-13 NOTE — PATIENT INSTRUCTIONS
Dr. Tg Rutledge Total and Reverse Total Shoulder Information    What is the surgery? - [facility] will be completely asleep for procedure. Dr. Tg Rutledge will make an incision in the front of your shoulder and replace the joint itself   - Total surgery takes about an hour and half   - You will then spend the night to receive prophylactic antibiotics and pain medication if needed. The goal is to send you home the following day and have you begin outpatient physical therapy about 3 days after you are discharged to home    What can you expect after surgery? - You will have a waterproof dressing on your shoulder following surgery. You will be able to shower 2 days after surgery but no soaking in a bath, hot tub, ocean or pool x 2 weeks to allow for full wound healing  - You will be in a sling for 4 weeks. You will wear this sling whenever you are active or up moving around. This sling is to keep you from moving your arm on your own. You are essentially one armed until you are out of your sling. This means no reaching, pulling, grabbing or lifting with the operative arm. It is ok for you to remove your sling when you are sitting in a chair or you are in bed  - Dr. Tg Rutledge will start physical therapy for you a few days after you are discharged from the hospital. While you cannot move your arm we allow physical therapy to gently move your shoulder. We call this passive range of motion. The goal of this is to decrease your stiffness and in turn decrease your post operative pain. - Plan on being in physical therapy for 10-12 weeks    When can I return to work? - Most patients return to desk work only after 2 weeks. You are able to type but there is no overhead work or lifting  - You will start some gentle lifting up to 5-10lbs at about 8-10 weeks post operatively.  You will gradually increase how much you are able to lift after this point under the guidance of Dr. Tg Rutledge, his physician assistant and physical therapy. - At 6 months you are able to do all activities as tolerated but it may take you a full 9-12 months to fully recover from your surgery    Not all shoulder replacements are the same. The specifics of your individual case will be discussed at length with you by Dr. Josephine Craft and his Physician Assistant. Edward Harding  Surgical Coordinator  [facility]. [facility].  [address] [place] [address] [email or web address]  P: [phone]  F: [phone]

## 2020-05-13 NOTE — DISCHARGE INSTRUCTIONS
Dr. Cherelle Toscano Total Shoulder Postoperative Information    How to manage your pain after your Surgery:  After your surgery it is expected that you will have some pain. In efforts to reduce the amounts of side effects from pain medications we would like you to follow the below medication regimen after surgery:  1. Take 1000mg of Tylenol by mouth every 8 hours x 5 days. This is 4 tabs of regular strength Tylenol or 2 tabs of Extra Strength Tylenol  2. We would like you to take a NSAID medication for the first 3 days following surgery. In efforts to avoid additional prescription costs we recommend one of the following over the counter medications. 600mg of Ibuprofen every 8 hours x 3 days    OR   500mg of Naproxen (Aleve) every 12 hours x 3 days  If you have a prescription for Meloxicam or Celebrex already you may resume this as previously prescribed instead of the Over the Counter Medications. DO NOT TAKE ANY OF THESE IF YOU HAVE CHRONIC RENAL FAILURE, ARE TAKING A BLOOD THINNER, HAVE A HISTORY OF A GASTRIC BLEED OR ARE ALLERGIC TO ASPIRIN. IT IS OK TO TAKE IF YOU HAVE HISTORY OF GASTRIC BYPASS SURGERY. 3. Then ONLY IF YOUR PAIN IS UNCONTROLLED you may take your Narcotic Pain medication as prescribed. THIS IS THE PRESCRIPTION THAT WAS GIVEN TO YOU IN THE OFFICE. You should place an ice bag over your shoulder to help with pain and reduce swelling. Your shoulder may be sore and develop bruising over the next several days. The bruising should resolve and no special care will be needed. Leave your bandage on until we see you in the office next week. It is safe to take a shower when you get home    You will be given a sling to use. Continue to wear this while you are active or up and moving around. OK to take off if you are sedentary. No moving the arm away from your body on your own, reaching or carrying with the operated arm. There has been an operation inside and around the shoulder joint. Complete healing may take weeks or several months. If you have a high temperature, unexpected pain, redness or swelling in your shoulder please contact my office immediately. Please call to make an appointment to see me in my office in 1 week.      Dr. Johnston [facility] office number [phone]

## 2020-05-14 ENCOUNTER — ANESTHESIA EVENT (OUTPATIENT)
Dept: SURGERY | Age: 65
DRG: 483 | End: 2020-05-14
Payer: MEDICARE

## 2020-05-14 ENCOUNTER — OFFICE VISIT (OUTPATIENT)
Dept: ORTHOPEDIC SURGERY | Age: 65
End: 2020-05-14

## 2020-05-14 VITALS
RESPIRATION RATE: 16 BRPM | HEART RATE: 88 BPM | SYSTOLIC BLOOD PRESSURE: 130 MMHG | DIASTOLIC BLOOD PRESSURE: 71 MMHG | OXYGEN SATURATION: 98 % | HEIGHT: 67 IN | WEIGHT: 136 LBS | TEMPERATURE: 97.4 F | BODY MASS INDEX: 21.35 KG/M2

## 2020-05-14 DIAGNOSIS — M75.101 ROTATOR CUFF TEAR ARTHROPATHY OF RIGHT SHOULDER: Primary | ICD-10-CM

## 2020-05-14 DIAGNOSIS — M12.811 ROTATOR CUFF TEAR ARTHROPATHY OF RIGHT SHOULDER: Primary | ICD-10-CM

## 2020-05-14 LAB — SARS-COV-2, COV2NT: NOT DETECTED

## 2020-05-14 RX ORDER — ACETAMINOPHEN 325 MG/1
1000 TABLET ORAL ONCE
Status: CANCELLED | OUTPATIENT
Start: 2020-05-14 | End: 2020-05-14

## 2020-05-14 RX ORDER — CELECOXIB 100 MG/1
400 CAPSULE ORAL ONCE
Status: CANCELLED | OUTPATIENT
Start: 2020-05-14 | End: 2020-05-14

## 2020-05-14 RX ORDER — GABAPENTIN 100 MG/1
400 CAPSULE ORAL ONCE
Status: CANCELLED | OUTPATIENT
Start: 2020-05-14 | End: 2020-05-14

## 2020-05-14 NOTE — H&P
HISTORY AND PHYSICAL          Patient: Agueda Garza                MRN: [id number]       SSN: [id number]  YOB: 1955          AGE: 77 y.o. SEX: female      Patient scheduled for:  Right reverse total shoulder arthroplasty    Surgeon: Cristel Hirsch MD    ANESTHESIA TYPE:  General    HISTORY:     The patient was seen in the office today for a preoperative history and physical for an upcoming above listed surgery. The patient is a pleasant 77 y.o. female who has a history of right shoulder pain. right shoulder pain since December. No injury. Gradually worsening. Worse when she reaches overhead and lifts. She notes prior surgery on her right biceps tendon at [facility] about 18 years ago. The patient had a cortisone injection on 1/13/2020 which provided minimal relief. She notes pain at night with laying on her right side. Pt reports a popping sensation and limited ROM with lifting the arm. Has been taking Celebrex with minimal relief. Pain level is a 7/10. Due to the current findings, affected activity of daily living and continued pain and discomfort, surgical intervention is indicated. The alternatives, risks, and complications, including but not limited to infection, blood loss, need for blood transfusion, neurovascular damage, stefan-incisional numbness, subcutaneous hematoma, bone fracture, anesthetic complications, DVT, PE, death, RSD, postoperative stiffness and pain, possible surgical scar, delayed healing and nonhealing, reflexive sympathetic dystrophy, damage to blood vessels and nerves, need for more surgery, MI, and stroke,  failure of hardware, gait disturbances,have been discussed. The patient understands and wishes to proceed with surgery.      PAST MEDICAL HISTORY:     Past Medical History:   Diagnosis Date    Arthritis     Fracture 2018    right foot - no surgery    Hepatitis A 1973    IBS (irritable bowel syndrome)        CURRENT MEDICATIONS:     Current Outpatient Medications   Medication Sig Dispense Refill    hyoscyamine SL (LEVSIN/SL) 0.125 mg SL tablet 0.125 mg by SubLINGual route every four (4) hours as needed for Cramping.  pantoprazole (PROTONIX) 40 mg tablet Take 40 mg by mouth daily.  cetirizine (ZYRTEC) 10 mg tablet Take  by mouth daily.  Calcium-Cholecalciferol, D3, (CALCIUM 600 WITH VITAMIN D3) 600 mg(1,500mg) -400 unit cap Take  by mouth daily.  fluticasone propionate (FLONASE) 50 mcg/actuation nasal spray 2 Sprays by Both Nostrils route daily as needed.  meloxicam (MOBIC) 15 mg tablet Take 1 Tab by mouth daily (with breakfast). 30 Tab 2    aspirin delayed-release 81 mg tablet Take  by mouth daily. ALLERGIES:     No Known Allergies      SURGICAL HISTORY:     Past Surgical History:   Procedure Laterality Date    HAND/FINGER SURGERY UNLISTED      HX  SECTION      HX ORTHOPAEDIC Bilateral     trigger finger release    HX ORTHOPAEDIC Right 2002    biceps tendon repair       SOCIAL HISTORY:     Social History     Socioeconomic History    Marital status: UNKNOWN     Spouse name: Not on file    Number of children: Not on file    Years of education: Not on file    Highest education level: Not on file   Tobacco Use    Smoking status: Current Every Day Smoker    Smokeless tobacco: Never Used    Tobacco comment: > 10 cigs/day   Substance and Sexual Activity    Alcohol use: Not Currently    Drug use: Never       FAMILY HISTORY:     Family History   Problem Relation Age of Onset    Arthritis-osteo Mother     Arthritis-osteo Father        REVIEW OF SYSTEMS:     Negative for fevers, chills, chest pain, shortness of breath, weight loss, recent illness     General: Negative for fever and chills. No unexpected change in weight. Denies fatigue. No change in appetite. Skin: Negative for rash or itching. HEENT: Negative for congestion, sore throat, neck pain and neck stiffness. No change in vision or hearing. Hasn't noted any enlarged lymph nodes in the neck. Cardiovascular:  Negative for chest pain and palpitations. Has not noted pedal edema. Respiratory: Negative for cough, colds, sinus, hemoptysis, shortness of breath and wheezing. Gastrointestinal: Negative for nausea and vomiting, rectal bleeding, coffee ground emesis, abdominal pain, diarrhea and constipation. Genitourinary: Negative for dysuria, frequency urgency, or burning on micturition. No flank pain, no foul smelling urine, no difficulty with initiating urination. Hematological: Negative for bleeding or easy bruising. Musculoskeletal: Negative  for arthralgias, back pain or neck pain. Neurological: Negative for dizziness, seizures or syncopal episodes. Denies headaches. Endocrine: Denies excessive thirst.  No heat/cold intolerance. Psychiatric: Negative for depression or insomnia. PHYSICAL EXAMINATION:     VITALS:   Visit Vitals  /71   Pulse 88   Temp 97.4 °F (36.3 °C) (Temporal)   Resp 16   Ht 5' 7\" (1.702 m)   Wt 136 lb (61.7 kg)   SpO2 98%   BMI 21.30 kg/m²     GEN:  Well developed, well nourished 77 y.o. female in no acute distress. HEENT: Normocephalic and atraumatic. Eyes: Conjunctivae and EOM are normal.Pupils are equal, round, and reactive to light. External ear normal appearance, external nose normal appearing. Mouth/Throat: Oropharynx is clear and moist, able to handle oral secretions w/out difficulty, airway patent  NECK: Supple. Normal ROM, No lymphadenopathy. Trachea is midline. No bruising, swelling or deformity  RESP: Clear to auscultation bilaterally. No wheezes, rales, rhonchi. Normal effort and breath sounds. No respiratory distress  CARDIO:  Normal rate, regular rhythm and normal heart sounds. No MGR. ABDOMEN: Soft, non-tender, non-distended, normoactive bowel sounds in all four quadrants. There is no tenderness. There is no rebound and no guarding.    BACK: No CVA or spinal tenderness  BREAST:  Deferred  PELVIC: Deferred   RECTAL:  Deferred   :           Deferred  EXTREMITIES: EXAMINATION OF: right shoulder  Examination Right shoulder   Skin Intact   AC joint tenderness -   Biceps tenderness +   Forward flexion/Elevation    Active abduction    Glenohumeral abduction 90   External rotation ROM 60   Internal rotation ROM 50   Apprehension -   Morelias Relocation -   Jerk -   Load and Shift -   Obriens -   Speeds -   Impingement sign +   Supraspinatus/Empty Can +   External Rotation Strength -, 5/5   Lift Off/Belly Press -, 5/5   Neurovascular Intact        NEUROVASCULAR: Sensation intact to light touch and strength grossly intact and symmetrical. No nystagmus. Positive distal pulses and capillary refill. DVT ASSESSMENT:  There is not  calf tenderness. No evidence of DVT seen on physical exam.  MOTOR: In tact  PSYCH: Alert an oriented to person, place and time. Mood, memory, affect, behavior and judgment normal       RADIOGRAPHS & DIAGNOSTIC STUDIES:     MRI/xray reveals : MRI of right shoulder dated 2/26/2020 reviewed and read by Dr. Corrie Perez:  IMPRESSION:  1.  Thin obliquely are oriented full thickness tear involving the supraspinatus and infraspinatus tendons at the enthesis. Low-grade infraspinatus myotendinous junction tear. Mild supraspinatus and infraspinatus fatty infiltration. 2.  Subscapularis articular sided fraying, involving the superior third fibers. 3.  Prior biceps tenotomy. 4.  Findings which may predispose to subacromial impingement. 5.  Moderate sized inferior directed distal clavicle osteophyte and coracoacromial ligament thickening. 6.  Moderate glenohumeral osteoarthritis with multiple intra-articular bodies in the inferior joint and subscapularis recess. Degenerative SLAP tear.     3 view xray images of right  shoulder taken in office on 1/13/2020 read and reviewed by JACQUELINE Katz, reveal: subacromial spur.  No acute abnormalities       LABS:       @  CBC:   Lab Results Component Value Date/Time    WBC 10.4 05/12/2020 11:32 AM    RBC 5.09 05/12/2020 11:32 AM    HGB 14.3 05/12/2020 11:32 AM    HCT 43.6 05/12/2020 11:32 AM    PLATELET 370 83/01/2793 11:32 AM    and BMP:   Lab Results   Component Value Date/Time    Glucose 81 05/12/2020 11:32 AM    Sodium 138 05/12/2020 11:32 AM    Potassium 4.0 05/12/2020 11:32 AM    Chloride 106 05/12/2020 11:32 AM    CO2 29 05/12/2020 11:32 AM    BUN 19 (H) 05/12/2020 11:32 AM    Creatinine 0.70 05/12/2020 11:32 AM    Calcium 8.3 (L) 05/12/2020 11:32 AM   @    Preoperative labs were reviewed and are substantially within normal limits   EKG:Normal sinus rhythm   Nonspecific ST abnormality   Abnormal ECG   When compared with ECG of 09-MAR-2020 11:38,   T wave inversion now evident in Inferior leads   Confirmed by Dariusz Mullins MD, Raúl Kingston ([id number]) on 5/12/2020 2:47:55 PM      COVID-19 screening not detected  ASSESSMENT:       Encounter Diagnosis   Name Primary?  Rotator cuff tear arthropathy of right shoulder Yes       PLAN:     Again, the alternatives, risks, and complications, as well as expected outcome were discussed. The patient understands and agrees to proceed with right reverse total shoulder arthroplasty. She has been cleared by PCP. The patient was counseled at length about the risks of hira Covid-19 during their perioperative period and any recovery window from their procedure. The patient was made aware that hira Covid-19  may worsen their prognosis for recovering from their procedure and lend to a higher morbidity and/or mortality risk. All material risks, benefits, and reasonable alternatives including postponing the procedure were discussed. The patient does  wish to proceed with the procedure at this time. Patient given orders listed below:    No orders of the defined types were placed in this encounter.         Jennifer Welsh PA-C  5/14/2020  10:51 AM

## 2020-05-15 ENCOUNTER — HOSPITAL ENCOUNTER (INPATIENT)
Age: 65
LOS: 2 days | Discharge: HOME HEALTH | DRG: 483 | End: 2020-05-17
Attending: ORTHOPAEDIC SURGERY | Admitting: ORTHOPAEDIC SURGERY
Payer: MEDICARE

## 2020-05-15 ENCOUNTER — ANESTHESIA (OUTPATIENT)
Dept: SURGERY | Age: 65
DRG: 483 | End: 2020-05-15
Payer: MEDICARE

## 2020-05-15 ENCOUNTER — TELEPHONE (OUTPATIENT)
Dept: ORTHOPEDIC SURGERY | Age: 65
End: 2020-05-15

## 2020-05-15 PROBLEM — M12.811 RIGHT ROTATOR CUFF TEAR ARTHROPATHY: Status: ACTIVE | Noted: 2020-05-15

## 2020-05-15 PROBLEM — M75.101 RIGHT ROTATOR CUFF TEAR ARTHROPATHY: Status: ACTIVE | Noted: 2020-05-15

## 2020-05-15 PROCEDURE — 77030027138 HC INCENT SPIROMETER -A: Performed by: ORTHOPAEDIC SURGERY

## 2020-05-15 PROCEDURE — 74011250636 HC RX REV CODE- 250/636: Performed by: ORTHOPAEDIC SURGERY

## 2020-05-15 PROCEDURE — 77030002933 HC SUT MCRYL J&J -A: Performed by: ORTHOPAEDIC SURGERY

## 2020-05-15 PROCEDURE — C1776 JOINT DEVICE (IMPLANTABLE): HCPCS | Performed by: ORTHOPAEDIC SURGERY

## 2020-05-15 PROCEDURE — 77030004413 HC BUR OVL STRY -B: Performed by: ORTHOPAEDIC SURGERY

## 2020-05-15 PROCEDURE — 77030027138 HC INCENT SPIROMETER -A

## 2020-05-15 PROCEDURE — 77030019605: Performed by: ORTHOPAEDIC SURGERY

## 2020-05-15 PROCEDURE — 74011250636 HC RX REV CODE- 250/636: Performed by: NURSE ANESTHETIST, CERTIFIED REGISTERED

## 2020-05-15 PROCEDURE — 77030013079 HC BLNKT BAIR HGGR 3M -A: Performed by: ANESTHESIOLOGY

## 2020-05-15 PROCEDURE — 77030002922 HC SUT FBRWRE ARTH -B: Performed by: ORTHOPAEDIC SURGERY

## 2020-05-15 PROCEDURE — 76010000132 HC OR TIME 2.5 TO 3 HR: Performed by: ORTHOPAEDIC SURGERY

## 2020-05-15 PROCEDURE — 77030039266 HC ADH SKN EXOFIN S2SG -A: Performed by: ORTHOPAEDIC SURGERY

## 2020-05-15 PROCEDURE — 74011250636 HC RX REV CODE- 250/636

## 2020-05-15 PROCEDURE — 77030019908 HC STETH ESOPH SIMS -A: Performed by: ANESTHESIOLOGY

## 2020-05-15 PROCEDURE — 77030020268 HC MISC GENERAL SUPPLY: Performed by: ORTHOPAEDIC SURGERY

## 2020-05-15 PROCEDURE — 77030040922 HC BLNKT HYPOTHRM STRY -A: Performed by: ORTHOPAEDIC SURGERY

## 2020-05-15 PROCEDURE — L3650 SO 8 ABD RESTRAINT PRE OTS: HCPCS | Performed by: ORTHOPAEDIC SURGERY

## 2020-05-15 PROCEDURE — 76060000036 HC ANESTHESIA 2.5 TO 3 HR: Performed by: ORTHOPAEDIC SURGERY

## 2020-05-15 PROCEDURE — 74011000250 HC RX REV CODE- 250: Performed by: NURSE ANESTHETIST, CERTIFIED REGISTERED

## 2020-05-15 PROCEDURE — 74011000250 HC RX REV CODE- 250: Performed by: ANESTHESIOLOGY

## 2020-05-15 PROCEDURE — C9290 INJ, BUPIVACAINE LIPOSOME: HCPCS | Performed by: ORTHOPAEDIC SURGERY

## 2020-05-15 PROCEDURE — 77030018836 HC SOL IRR NACL ICUM -A: Performed by: ORTHOPAEDIC SURGERY

## 2020-05-15 PROCEDURE — 97161 PT EVAL LOW COMPLEX 20 MIN: CPT

## 2020-05-15 PROCEDURE — 77030031367: Performed by: ORTHOPAEDIC SURGERY

## 2020-05-15 PROCEDURE — 77030040361 HC SLV COMPR DVT MDII -B: Performed by: ORTHOPAEDIC SURGERY

## 2020-05-15 PROCEDURE — 77030012407 HC DRN WND BARD -B: Performed by: ORTHOPAEDIC SURGERY

## 2020-05-15 PROCEDURE — 65270000029 HC RM PRIVATE

## 2020-05-15 PROCEDURE — 74011250637 HC RX REV CODE- 250/637: Performed by: NURSE ANESTHETIST, CERTIFIED REGISTERED

## 2020-05-15 PROCEDURE — 77030031139 HC SUT VCRL2 J&J -A: Performed by: ORTHOPAEDIC SURGERY

## 2020-05-15 PROCEDURE — 64450 NJX AA&/STRD OTHER PN/BRANCH: CPT | Performed by: ANESTHESIOLOGY

## 2020-05-15 PROCEDURE — 77030008462 HC STPLR SKN PROX J&J -A: Performed by: ORTHOPAEDIC SURGERY

## 2020-05-15 PROCEDURE — 77030006835 HC BLD SAW SAG STRY -B: Performed by: ORTHOPAEDIC SURGERY

## 2020-05-15 PROCEDURE — 77030003601 HC NDL NRV BLK BBMI -A: Performed by: ORTHOPAEDIC SURGERY

## 2020-05-15 PROCEDURE — 77030013708 HC HNDPC SUC IRR PULS STRY –B: Performed by: ORTHOPAEDIC SURGERY

## 2020-05-15 PROCEDURE — 76942 ECHO GUIDE FOR BIOPSY: CPT | Performed by: ANESTHESIOLOGY

## 2020-05-15 PROCEDURE — 76210000006 HC OR PH I REC 0.5 TO 1 HR: Performed by: ORTHOPAEDIC SURGERY

## 2020-05-15 PROCEDURE — 77030003666 HC NDL SPINAL BD -A: Performed by: ANESTHESIOLOGY

## 2020-05-15 PROCEDURE — 74011250637 HC RX REV CODE- 250/637: Performed by: PHYSICIAN ASSISTANT

## 2020-05-15 PROCEDURE — 97116 GAIT TRAINING THERAPY: CPT

## 2020-05-15 PROCEDURE — 74011000258 HC RX REV CODE- 258: Performed by: ORTHOPAEDIC SURGERY

## 2020-05-15 PROCEDURE — 74011000258 HC RX REV CODE- 258: Performed by: ANESTHESIOLOGY

## 2020-05-15 PROCEDURE — 74011250636 HC RX REV CODE- 250/636: Performed by: ANESTHESIOLOGY

## 2020-05-15 DEVICE — SCREW BNE L20MM DIA45MM PERIPH NONLOCKING FOR MOD GLEN SYS: Type: IMPLANTABLE DEVICE | Site: SHOULDER | Status: FUNCTIONAL

## 2020-05-15 DEVICE — COMPONENT SHLDR REVERSED S4: Type: IMPLANTABLE DEVICE | Site: SHOULDER | Status: FUNCTIONAL

## 2020-05-15 DEVICE — STEM HUM SZ 7 SHLDR TI UNIVERS REVERS: Type: IMPLANTABLE DEVICE | Site: SHOULDER | Status: FUNCTIONAL

## 2020-05-15 DEVICE — SCREW BONE L16MM DIA4.5MM PERIPH NONLOCKING FOR MOD GLEN SYS: Type: IMPLANTABLE DEVICE | Site: SHOULDER | Status: FUNCTIONAL

## 2020-05-15 RX ORDER — POLYETHYLENE GLYCOL 3350 17 G/17G
17 POWDER, FOR SOLUTION ORAL DAILY
Status: DISCONTINUED | OUTPATIENT
Start: 2020-05-15 | End: 2020-05-17 | Stop reason: HOSPADM

## 2020-05-15 RX ORDER — MORPHINE SULFATE 2 MG/ML
1-2 INJECTION, SOLUTION INTRAMUSCULAR; INTRAVENOUS
Status: DISCONTINUED | OUTPATIENT
Start: 2020-05-15 | End: 2020-05-17 | Stop reason: HOSPADM

## 2020-05-15 RX ORDER — FENTANYL CITRATE 50 UG/ML
INJECTION, SOLUTION INTRAMUSCULAR; INTRAVENOUS
Status: COMPLETED | OUTPATIENT
Start: 2020-05-15 | End: 2020-05-15

## 2020-05-15 RX ORDER — CALCIUM CARBONATE/VITAMIN D3 600MG-5MCG
1 TABLET ORAL DAILY
Status: DISCONTINUED | OUTPATIENT
Start: 2020-05-16 | End: 2020-05-17 | Stop reason: HOSPADM

## 2020-05-15 RX ORDER — EPHEDRINE SULFATE/0.9% NACL/PF 50 MG/5 ML
SYRINGE (ML) INTRAVENOUS AS NEEDED
Status: DISCONTINUED | OUTPATIENT
Start: 2020-05-15 | End: 2020-05-15 | Stop reason: HOSPADM

## 2020-05-15 RX ORDER — ACETAMINOPHEN 500 MG
1000 TABLET ORAL EVERY 8 HOURS
Status: DISCONTINUED | OUTPATIENT
Start: 2020-05-15 | End: 2020-05-17 | Stop reason: HOSPADM

## 2020-05-15 RX ORDER — SODIUM CHLORIDE 0.9 % (FLUSH) 0.9 %
5-40 SYRINGE (ML) INJECTION AS NEEDED
Status: DISCONTINUED | OUTPATIENT
Start: 2020-05-15 | End: 2020-05-15 | Stop reason: HOSPADM

## 2020-05-15 RX ORDER — ROCURONIUM BROMIDE 10 MG/ML
INJECTION, SOLUTION INTRAVENOUS AS NEEDED
Status: DISCONTINUED | OUTPATIENT
Start: 2020-05-15 | End: 2020-05-15 | Stop reason: HOSPADM

## 2020-05-15 RX ORDER — MAGNESIUM SULFATE 100 %
4 CRYSTALS MISCELLANEOUS AS NEEDED
Status: DISCONTINUED | OUTPATIENT
Start: 2020-05-15 | End: 2020-05-15 | Stop reason: HOSPADM

## 2020-05-15 RX ORDER — CEFAZOLIN SODIUM 2 G/50ML
2 SOLUTION INTRAVENOUS ONCE
Status: DISCONTINUED | OUTPATIENT
Start: 2020-05-15 | End: 2020-05-15 | Stop reason: HOSPADM

## 2020-05-15 RX ORDER — FENTANYL CITRATE 50 UG/ML
100 INJECTION, SOLUTION INTRAMUSCULAR; INTRAVENOUS ONCE
Status: COMPLETED | OUTPATIENT
Start: 2020-05-15 | End: 2020-05-15

## 2020-05-15 RX ORDER — ACETAMINOPHEN 500 MG
1000 TABLET ORAL ONCE
Status: DISCONTINUED | OUTPATIENT
Start: 2020-05-15 | End: 2020-05-15 | Stop reason: HOSPADM

## 2020-05-15 RX ORDER — SODIUM CHLORIDE, SODIUM LACTATE, POTASSIUM CHLORIDE, CALCIUM CHLORIDE 600; 310; 30; 20 MG/100ML; MG/100ML; MG/100ML; MG/100ML
75 INJECTION, SOLUTION INTRAVENOUS CONTINUOUS
Status: DISCONTINUED | OUTPATIENT
Start: 2020-05-15 | End: 2020-05-15 | Stop reason: HOSPADM

## 2020-05-15 RX ORDER — GLYCOPYRROLATE 0.2 MG/ML
INJECTION INTRAMUSCULAR; INTRAVENOUS AS NEEDED
Status: DISCONTINUED | OUTPATIENT
Start: 2020-05-15 | End: 2020-05-15 | Stop reason: HOSPADM

## 2020-05-15 RX ORDER — HYDROMORPHONE HYDROCHLORIDE 2 MG/ML
0.5 INJECTION, SOLUTION INTRAMUSCULAR; INTRAVENOUS; SUBCUTANEOUS
Status: DISCONTINUED | OUTPATIENT
Start: 2020-05-15 | End: 2020-05-15 | Stop reason: HOSPADM

## 2020-05-15 RX ORDER — SUCCINYLCHOLINE CHLORIDE 20 MG/ML INJECTION SOLUTION
SOLUTION AS NEEDED
Status: DISCONTINUED | OUTPATIENT
Start: 2020-05-15 | End: 2020-05-15 | Stop reason: HOSPADM

## 2020-05-15 RX ORDER — AMOXICILLIN 250 MG
1 CAPSULE ORAL DAILY
Status: DISCONTINUED | OUTPATIENT
Start: 2020-05-15 | End: 2020-05-17 | Stop reason: HOSPADM

## 2020-05-15 RX ORDER — FAMOTIDINE 20 MG/1
20 TABLET, FILM COATED ORAL ONCE
Status: COMPLETED | OUTPATIENT
Start: 2020-05-15 | End: 2020-05-15

## 2020-05-15 RX ORDER — OXYCODONE HYDROCHLORIDE 5 MG/1
5 TABLET ORAL
Status: DISCONTINUED | OUTPATIENT
Start: 2020-05-15 | End: 2020-05-17 | Stop reason: HOSPADM

## 2020-05-15 RX ORDER — NALOXONE HYDROCHLORIDE 0.4 MG/ML
0.1 INJECTION, SOLUTION INTRAMUSCULAR; INTRAVENOUS; SUBCUTANEOUS AS NEEDED
Status: DISCONTINUED | OUTPATIENT
Start: 2020-05-15 | End: 2020-05-15 | Stop reason: HOSPADM

## 2020-05-15 RX ORDER — LORATADINE 10 MG/1
10 TABLET ORAL DAILY
Status: DISCONTINUED | OUTPATIENT
Start: 2020-05-15 | End: 2020-05-17 | Stop reason: HOSPADM

## 2020-05-15 RX ORDER — CEFAZOLIN SODIUM 2 G/50ML
2 SOLUTION INTRAVENOUS EVERY 8 HOURS
Status: COMPLETED | OUTPATIENT
Start: 2020-05-15 | End: 2020-05-16

## 2020-05-15 RX ORDER — ALBUTEROL SULFATE 0.83 MG/ML
2.5 SOLUTION RESPIRATORY (INHALATION) AS NEEDED
Status: DISCONTINUED | OUTPATIENT
Start: 2020-05-15 | End: 2020-05-15 | Stop reason: HOSPADM

## 2020-05-15 RX ORDER — INSULIN LISPRO 100 [IU]/ML
INJECTION, SOLUTION INTRAVENOUS; SUBCUTANEOUS ONCE
Status: DISCONTINUED | OUTPATIENT
Start: 2020-05-15 | End: 2020-05-15 | Stop reason: HOSPADM

## 2020-05-15 RX ORDER — ACETAMINOPHEN 500 MG
1000 TABLET ORAL ONCE
Status: COMPLETED | OUTPATIENT
Start: 2020-05-15 | End: 2020-05-15

## 2020-05-15 RX ORDER — DEXTROSE 50 % IN WATER (D50W) INTRAVENOUS SYRINGE
25-50 AS NEEDED
Status: DISCONTINUED | OUTPATIENT
Start: 2020-05-15 | End: 2020-05-15 | Stop reason: HOSPADM

## 2020-05-15 RX ORDER — FLUTICASONE PROPIONATE 50 MCG
2 SPRAY, SUSPENSION (ML) NASAL DAILY
Status: DISCONTINUED | OUTPATIENT
Start: 2020-05-15 | End: 2020-05-17 | Stop reason: HOSPADM

## 2020-05-15 RX ORDER — ONDANSETRON 2 MG/ML
8 INJECTION INTRAMUSCULAR; INTRAVENOUS
Status: DISCONTINUED | OUTPATIENT
Start: 2020-05-15 | End: 2020-05-17 | Stop reason: HOSPADM

## 2020-05-15 RX ORDER — MELOXICAM 7.5 MG/1
15 TABLET ORAL
Status: DISCONTINUED | OUTPATIENT
Start: 2020-05-16 | End: 2020-05-17 | Stop reason: HOSPADM

## 2020-05-15 RX ORDER — GABAPENTIN 400 MG/1
400 CAPSULE ORAL ONCE
Status: COMPLETED | OUTPATIENT
Start: 2020-05-15 | End: 2020-05-15

## 2020-05-15 RX ORDER — SODIUM CHLORIDE 0.9 % (FLUSH) 0.9 %
5-40 SYRINGE (ML) INJECTION EVERY 8 HOURS
Status: DISCONTINUED | OUTPATIENT
Start: 2020-05-15 | End: 2020-05-15 | Stop reason: HOSPADM

## 2020-05-15 RX ORDER — MORPHINE SULFATE 1 MG/ML
INJECTION, SOLUTION EPIDURAL; INTRATHECAL; INTRAVENOUS
Status: COMPLETED | OUTPATIENT
Start: 2020-05-15 | End: 2020-05-15

## 2020-05-15 RX ORDER — ASPIRIN 81 MG/1
81 TABLET ORAL DAILY
Status: DISCONTINUED | OUTPATIENT
Start: 2020-05-16 | End: 2020-05-17 | Stop reason: HOSPADM

## 2020-05-15 RX ORDER — ROPIVACAINE HYDROCHLORIDE 5 MG/ML
INJECTION, SOLUTION EPIDURAL; INFILTRATION; PERINEURAL
Status: COMPLETED | OUTPATIENT
Start: 2020-05-15 | End: 2020-05-15

## 2020-05-15 RX ORDER — CEFAZOLIN SODIUM IN 0.9 % NACL 2 G/100 ML
PLASTIC BAG, INJECTION (ML) INTRAVENOUS AS NEEDED
Status: DISCONTINUED | OUTPATIENT
Start: 2020-05-15 | End: 2020-05-15 | Stop reason: HOSPADM

## 2020-05-15 RX ORDER — CELECOXIB 400 MG/1
400 CAPSULE ORAL ONCE
Status: COMPLETED | OUTPATIENT
Start: 2020-05-15 | End: 2020-05-15

## 2020-05-15 RX ORDER — LIDOCAINE HYDROCHLORIDE 10 MG/ML
0.1 INJECTION, SOLUTION EPIDURAL; INFILTRATION; INTRACAUDAL; PERINEURAL AS NEEDED
Status: DISCONTINUED | OUTPATIENT
Start: 2020-05-15 | End: 2020-05-15 | Stop reason: HOSPADM

## 2020-05-15 RX ORDER — NEOSTIGMINE METHYLSULFATE 1 MG/ML
INJECTION, SOLUTION INTRAVENOUS AS NEEDED
Status: DISCONTINUED | OUTPATIENT
Start: 2020-05-15 | End: 2020-05-15 | Stop reason: HOSPADM

## 2020-05-15 RX ORDER — ROPIVACAINE HYDROCHLORIDE 5 MG/ML
30 INJECTION, SOLUTION EPIDURAL; INFILTRATION; PERINEURAL
Status: COMPLETED | OUTPATIENT
Start: 2020-05-15 | End: 2020-05-15

## 2020-05-15 RX ORDER — PROPOFOL 10 MG/ML
INJECTION, EMULSION INTRAVENOUS AS NEEDED
Status: DISCONTINUED | OUTPATIENT
Start: 2020-05-15 | End: 2020-05-15 | Stop reason: HOSPADM

## 2020-05-15 RX ORDER — NALOXONE HYDROCHLORIDE 0.4 MG/ML
1 INJECTION, SOLUTION INTRAMUSCULAR; INTRAVENOUS; SUBCUTANEOUS
Status: ACTIVE | OUTPATIENT
Start: 2020-05-15 | End: 2020-05-16

## 2020-05-15 RX ORDER — PANTOPRAZOLE SODIUM 40 MG/1
40 TABLET, DELAYED RELEASE ORAL DAILY
Status: DISCONTINUED | OUTPATIENT
Start: 2020-05-15 | End: 2020-05-17 | Stop reason: HOSPADM

## 2020-05-15 RX ORDER — MIDAZOLAM HYDROCHLORIDE 1 MG/ML
2 INJECTION, SOLUTION INTRAMUSCULAR; INTRAVENOUS ONCE
Status: COMPLETED | OUTPATIENT
Start: 2020-05-15 | End: 2020-05-15

## 2020-05-15 RX ORDER — KETOROLAC TROMETHAMINE 15 MG/ML
INJECTION, SOLUTION INTRAMUSCULAR; INTRAVENOUS AS NEEDED
Status: DISCONTINUED | OUTPATIENT
Start: 2020-05-15 | End: 2020-05-15 | Stop reason: HOSPADM

## 2020-05-15 RX ORDER — DEXAMETHASONE SODIUM PHOSPHATE 4 MG/ML
INJECTION, SOLUTION INTRA-ARTICULAR; INTRALESIONAL; INTRAMUSCULAR; INTRAVENOUS; SOFT TISSUE AS NEEDED
Status: DISCONTINUED | OUTPATIENT
Start: 2020-05-15 | End: 2020-05-15 | Stop reason: HOSPADM

## 2020-05-15 RX ORDER — ONDANSETRON 2 MG/ML
INJECTION INTRAMUSCULAR; INTRAVENOUS AS NEEDED
Status: DISCONTINUED | OUTPATIENT
Start: 2020-05-15 | End: 2020-05-15 | Stop reason: HOSPADM

## 2020-05-15 RX ADMIN — FENTANYL CITRATE 25 MCG: 50 INJECTION, SOLUTION INTRAMUSCULAR; INTRAVENOUS at 09:33

## 2020-05-15 RX ADMIN — FENTANYL CITRATE 100 MCG: 50 INJECTION, SOLUTION INTRAMUSCULAR; INTRAVENOUS at 07:11

## 2020-05-15 RX ADMIN — Medication 2 G: at 07:54

## 2020-05-15 RX ADMIN — Medication 10 MG: at 07:42

## 2020-05-15 RX ADMIN — LORATADINE 10 MG: 10 TABLET ORAL at 12:06

## 2020-05-15 RX ADMIN — FENTANYL CITRATE 25 MCG: 50 INJECTION, SOLUTION INTRAMUSCULAR; INTRAVENOUS at 08:55

## 2020-05-15 RX ADMIN — ACETAMINOPHEN 1000 MG: 500 TABLET, FILM COATED ORAL at 07:00

## 2020-05-15 RX ADMIN — MIDAZOLAM 2 MG: 1 INJECTION INTRAMUSCULAR; INTRAVENOUS at 07:09

## 2020-05-15 RX ADMIN — SODIUM CHLORIDE, SODIUM LACTATE, POTASSIUM CHLORIDE, AND CALCIUM CHLORIDE 75 ML/HR: 600; 310; 30; 20 INJECTION, SOLUTION INTRAVENOUS at 06:40

## 2020-05-15 RX ADMIN — CEFAZOLIN SODIUM 2 G: 2 SOLUTION INTRAVENOUS at 16:09

## 2020-05-15 RX ADMIN — SENNOSIDES, DOCUSATE SODIUM 1 TABLET: 50; 8.6 TABLET, FILM COATED ORAL at 12:06

## 2020-05-15 RX ADMIN — GLYCOPYRROLATE 0.4 MG: 0.2 INJECTION INTRAMUSCULAR; INTRAVENOUS at 09:55

## 2020-05-15 RX ADMIN — PHENYLEPHRINE HYDROCHLORIDE 100 MCG: 10 INJECTION INTRAVENOUS at 07:38

## 2020-05-15 RX ADMIN — FENTANYL CITRATE 25 MCG: 50 INJECTION, SOLUTION INTRAMUSCULAR; INTRAVENOUS at 07:36

## 2020-05-15 RX ADMIN — MORPHINE SULFATE 2 MG: 1 INJECTION, SOLUTION EPIDURAL; INTRATHECAL; INTRAVENOUS at 07:11

## 2020-05-15 RX ADMIN — KETOROLAC TROMETHAMINE 30 MG: 15 INJECTION, SOLUTION INTRAMUSCULAR; INTRAVENOUS at 08:04

## 2020-05-15 RX ADMIN — LIDOCAINE HYDROCHLORIDE 2 ML: 10 INJECTION, SOLUTION EPIDURAL; INFILTRATION; INTRACAUDAL; PERINEURAL at 07:10

## 2020-05-15 RX ADMIN — ROPIVACAINE HYDROCHLORIDE 20 ML: 5 INJECTION, SOLUTION EPIDURAL; INFILTRATION; PERINEURAL at 07:11

## 2020-05-15 RX ADMIN — ACETAMINOPHEN 1000 MG: 500 TABLET, FILM COATED ORAL at 21:57

## 2020-05-15 RX ADMIN — ONDANSETRON 4 MG: 2 INJECTION INTRAMUSCULAR; INTRAVENOUS at 08:07

## 2020-05-15 RX ADMIN — FENTANYL CITRATE 100 MCG: 50 INJECTION INTRAMUSCULAR; INTRAVENOUS at 07:09

## 2020-05-15 RX ADMIN — PROPOFOL 150 MG: 10 INJECTION, EMULSION INTRAVENOUS at 07:36

## 2020-05-15 RX ADMIN — GABAPENTIN 400 MG: 400 CAPSULE ORAL at 07:00

## 2020-05-15 RX ADMIN — CELECOXIB 400 MG: 400 CAPSULE ORAL at 07:00

## 2020-05-15 RX ADMIN — Medication 160 MG: at 07:36

## 2020-05-15 RX ADMIN — ROPIVACAINE HYDROCHLORIDE 150 MG: 150 INJECTION, SOLUTION EPIDURAL; INFILTRATION; PERINEURAL at 07:12

## 2020-05-15 RX ADMIN — DEXAMETHASONE SODIUM PHOSPHATE 4 MG: 4 INJECTION, SOLUTION INTRAMUSCULAR; INTRAVENOUS at 08:07

## 2020-05-15 RX ADMIN — CEFAZOLIN SODIUM 2 G: 2 SOLUTION INTRAVENOUS at 21:57

## 2020-05-15 RX ADMIN — ROCURONIUM BROMIDE 10 MG: 10 INJECTION, SOLUTION INTRAVENOUS at 07:36

## 2020-05-15 RX ADMIN — FAMOTIDINE 20 MG: 20 TABLET ORAL at 07:00

## 2020-05-15 RX ADMIN — Medication 2 MG: at 09:55

## 2020-05-15 RX ADMIN — FENTANYL CITRATE 25 MCG: 50 INJECTION, SOLUTION INTRAMUSCULAR; INTRAVENOUS at 08:19

## 2020-05-15 NOTE — ANESTHESIA PROCEDURE NOTES
Peripheral Block    Start time: 5/15/2020 7:05 AM  End time: 5/15/2020 7:14 AM  Performed by: Drew Oleary MD  Authorized by: Drew Oleary MD       Pre-procedure: Indications: at surgeon's request and post-op pain management    Preanesthetic Checklist: patient identified, risks and benefits discussed, site marked, timeout performed, anesthesia consent given and patient being monitored    Timeout Time: 07:05          Block Type:   Block Type:   Interscalene  Laterality:  Right  Monitoring:  Standard ASA monitoring, responsive to questions, oxygen, continuous pulse ox, frequent vital sign checks and heart rate  Injection Technique:  Single shot  Procedures: ultrasound guided and nerve stimulator    Patient Position: supine  Prep: chlorhexidine    Location:  Interscalene  Needle Type:  Stimuplex  Needle Gauge:  20 G  Needle Localization:  Anatomical landmarks, nerve stimulator and ultrasound guidance  Motor Response: minimal motor response >0.4 mA      Assessment:  Number of attempts:  1  Injection Assessment:  Incremental injection every 5 mL, negative aspiration for CSF, no paresthesia, ultrasound image on chart, local visualized surrounding nerve on ultrasound, negative aspiration for blood, no intravascular symptoms and low pressure verified by pressure monitor  Patient tolerance:  Patient tolerated the procedure well with no immediate complications

## 2020-05-15 NOTE — TELEPHONE ENCOUNTER
Order placed please send over Dr. Karthik Lopez post op protocol for reverse total shoulder arthroplasty.

## 2020-05-15 NOTE — PERIOP NOTES
TRANSFER - OUT REPORT:    Verbal report given to Gretta STACY(name) on Calleen Bud  being transferred to [address] [place] [address](unit) for routine post - op       Report consisted of patients Situation, Background, Assessment and   Recommendations(SBAR). Information from the following report(s) SBAR, Kardex, OR Summary, Procedure Summary, Intake/Output, MAR, Recent Results and Med Rec Status was reviewed with the receiving nurse. Lines:   Peripheral IV 05/15/20 Right Hand (Active)   Site Assessment Clean, dry, & intact 5/15/2020 10:23 AM   Phlebitis Assessment 0 5/15/2020 10:23 AM   Infiltration Assessment 0 5/15/2020 10:23 AM   Dressing Status Clean, dry, & intact 5/15/2020 10:23 AM   Dressing Type Tape;Transparent 5/15/2020 10:23 AM   Hub Color/Line Status Pink; Infusing 5/15/2020 10:23 AM   Alcohol Cap Used No 5/15/2020  6:46 AM        Opportunity for questions and clarification was provided.       Patient transported with:   [employer]

## 2020-05-15 NOTE — BRIEF OP NOTE
Brief Postoperative Note    Patient: Dareen Alpers  YOB: 1955  MRN: [id number]    Date of Procedure: 5/15/2020     Pre-Op Diagnosis: right cuff tear arthropathy    Post-Op Diagnosis: Same as preoperative diagnosis. Procedure(s):  RIGHT REVERSE TOTAL SHOULDER Arthroplasty    Surgeon(s):  Vi Omer MD    Surgical Assistant: Physician Assistant: Judyth Hammans, PA    Anesthesia: General     Estimated Blood Loss (mL): less than 883     Complications: None    Specimens: * No specimens in log *     Implants:   Implant Name Type Inv.  Item Serial No.  Lot No. LRB No. Used Action   [employer]IVERS REVERS MODULAR GLENOID SYSTEM BASEPLATE, MODULAR    [employer] [id number] Right 1 Implanted   UNIVERS REVERS MODULAR GLENOID SYSTEM, GLENOSPHERE 33+4 LAT/24    [employer]TH[employer]X [id number] Right 1 Implanted   [employer]IVERS REVERS MODULAR GLENOID SYSTEM CENTRAL SCREW, MODULAR 25MM    [employer] [id number] Right 1 Implanted   UNIVERS REVERS MODULAR GLENOID SYSTEM PERIPHERAL Shahbaz, LOCKING 5.5S28MM    [employer]X [id number] Right 1 Implanted   UNIVERS REVERS MODULAR GLENOID SYSTEM PERIPHERAL SCREW, LOCKING 5.5X36MM    [employer]X [id number] Right 1 Implanted   SCR BNE NLCK 4.5X20MM -- UNIVERS REVERS - [id number]  SCR BNE NLCK 4.5X20MM -- Darin Simmonds  ARTH[employer]X [id number] Right 1 Implanted   SCR BNE NLCK 4.5X16MM -- [employer]IVERS REVERS - [id number]  SCR BNE NLCK 4.5X16MM -- Darin Simmonds  AR[employer]X [id number] Right 1 Implanted   STEM HUM REV SHLDR SZ7 Shira Deal - [id number]  STEM HUM REV SHLDR SZ7 -- [employer]IVERS REVERS  [employer]X [id number] Right 1 Implanted   [employer]IVERS REVERS SUTURE CUP 36 (+2 RIGHT)    ARTH[employer]X [id number] Right 1 Implanted   UNIVERS REVERS MODULAR GLENOID SYSTEM, COMBO HUMERAL INSERT, 36+6/33    ARTHREX [id number] Right 1 Implanted       Drains: * No LDAs found *    Findings: cuff tear arthropathy    Electronically Signed by JACQUELINE Hickman on 5/15/2020 at 10:00 AM

## 2020-05-15 NOTE — PROGRESS NOTES
Bedside and Verbal shift change report given to Angel Sanchez RN (oncoming nurse) by Nelda Garcia RN (offgoing nurse). Report included the following information SBAR and Kardex.

## 2020-05-15 NOTE — ANESTHESIA POSTPROCEDURE EVALUATION
Procedure(s):  RIGHT REVERSE TOTAL SHOULDER REPLACEMENT. general    Anesthesia Post Evaluation      Multimodal analgesia: multimodal analgesia used between 6 hours prior to anesthesia start to PACU discharge  Patient location during evaluation: PACU  Patient participation: complete - patient participated  Level of consciousness: awake  Pain score: 0  Pain management: adequate  Airway patency: patent  Anesthetic complications: no  Cardiovascular status: acceptable  Respiratory status: acceptable  Hydration status: acceptable  Post anesthesia nausea and vomiting:  none      Vitals Value Taken Time   /61 5/15/2020 10:51 AM   Temp 36.4 °C (97.5 °F) 5/15/2020 10:55 AM   Pulse 80 5/15/2020 11:00 AM   Resp 12 5/15/2020 11:00 AM   SpO2 92 % 5/15/2020 11:00 AM   Vitals shown include unvalidated device data.

## 2020-05-15 NOTE — ANESTHESIA PREPROCEDURE EVALUATION
Relevant Problems   No relevant active problems       Anesthetic History   No history of anesthetic complications            Review of Systems / Medical History  Patient summary reviewed and pertinent labs reviewed    Pulmonary          Smoker         Neuro/Psych             Comments: Chronic pain Cardiovascular  Within defined limits                Exercise tolerance: >4 METS     GI/Hepatic/Renal     GERD: well controlled           Endo/Other        Arthritis     Other Findings              Physical Exam    Airway  Mallampati: II  TM Distance: > 6 cm  Neck ROM: normal range of motion   Mouth opening: Normal     Cardiovascular  Regular rate and rhythm,  S1 and S2 normal,  no murmur, click, rub, or gallop             Dental    Dentition: Full lower dentures and Full upper dentures     Pulmonary  Breath sounds clear to auscultation               Abdominal  GI exam deferred       Other Findings            Anesthetic Plan    ASA: 2  Anesthesia type: general      Post-op pain plan if not by surgeon: peripheral nerve block single    Induction: Intravenous  Anesthetic plan and risks discussed with: Patient

## 2020-05-15 NOTE — PROGRESS NOTES
Problem: Mobility Impaired (Adult and Pediatric)  Goal: *Acute Goals and Plan of Care (Insert Text)  Description: Physical Therapy Goals  Initiated 5/15/2020 and to be accomplished within 7 day(s)  1. Patient will move from supine to sit and sit to supine , scoot up and down and roll side to side in bed with modified independence. 2.  Patient will transfer from bed to chair and chair to bed with modified independence using the least restrictive device. 3.  Patient will perform sit to stand with modified independence. 4.  Patient will ambulate with modified independence for 75 feet with the least restrictive device. 5.  Patient will ascend/descend 5 stairs with 1 handrail(s) with supervision/set-up. PLOF: Pt lives with her  in a one story home with 3-5 SHANNA with 1 HR, was ind PTA, does not have any DME. Outcome: Progressing Towards Goal     PHYSICAL THERAPY EVALUATION    Patient: Farida Winkler (89 y.o. female)  Date: 5/15/2020  Primary Diagnosis: Nontraumatic complete tear of right rotator cuff [M75.121]  Right rotator cuff tear arthropathy [M75.101, M12.811]  Procedure(s) (LRB):  RIGHT REVERSE TOTAL SHOULDER REPLACEMENT (Right) Day of Surgery   Precautions:      NWB RUE, no PROM shoulder, AROM wrist/hand, sling off in bed if desired. ASSESSMENT :  Patient is 76 yo F admitted to hospital for R RTSA and presents today is received in bed, drowsy but alert and agreeable to therapy and was resting with ice pack upon arrival. Patient was educated on role of therpay and shoulder precautions including no active use of shoulder and sling wear. Patient educated on components of sling and on donning/doffing and verbalized understanding. Patient educated on importance of continued elbow flex/ext, wrist flex/ext and ulnar and radial deviation, as well as active finger and hand movement to prevent edema and weakness.  Patient then transferred to sitting at EOB and sling adjusted for appropriate fit with education and objective LE assessment performed. Patient then educated on sit <> stand transfer and gait training and stood with CGA. Patient ambulated 15 ft with CGA and no AD within the room with mild unsteadiness noted and at conclusion of session transferred back to bed due to still feeling loopy. Patient was left resting with call bell by their side, ice pack donned, and denied need for further assist at this time. Patient encouraged to continue mobilization with staff assist and educated not to get up without assist; oriented to call bell use. Nursing notified of pt progress this date. Patient will benefit from skilled intervention to address the above impairments. Patient's rehabilitation potential is considered to be Good  Factors which may influence rehabilitation potential include:   [x]         None noted  []         Mental ability/status  []         Medical condition  []         Home/family situation and support systems  []         Safety awareness  []         Pain tolerance/management  []         Other:      PLAN :  Recommendations and Planned Interventions:   [x]           Bed Mobility Training             [x]    Neuromuscular Re-Education  [x]           Transfer Training                   []    Orthotic/Prosthetic Training  [x]           Gait Training                          []    Modalities  [x]           Therapeutic Exercises           []    Edema Management/Control  [x]           Therapeutic Activities            [x]    Family Training/Education  [x]           Patient Education  []           Other (comment):    Frequency/Duration: Patient will be followed by physical therapy 1-2 times per day/4-7 days per week to address goals. Discharge Recommendations: Home Health  Further Equipment Recommendations for Discharge: N/A     SUBJECTIVE:   Patient stated I feel funny.     OBJECTIVE DATA SUMMARY:     Past Medical History:   Diagnosis Date    Arthritis     Fracture 2018    right foot - no surgery    Hepatitis A     IBS (irritable bowel syndrome)      Past Surgical History:   Procedure Laterality Date    HAND/FINGER SURGERY UNLISTED      HX  SECTION      HX ORTHOPAEDIC Bilateral     trigger finger release    HX ORTHOPAEDIC Right 2002    biceps tendon repair     Barriers to Learning/Limitations: None  Compensate with: N/A  Home Situation:  Home Situation  Home Environment: Private residence  One/Two Story Residence: One story  Living Alone: No  Support Systems: Spouse/Significant Other/Partner, Family member(s)  Patient Expects to be Discharged to[de-identified] Private residence  Current DME Used/Available at Home: None  Critical Behavior:  Neurologic State: Drowsy; Alert              Skin Condition/Temp: Dry;Warm     Skin Integrity: Incision (comment)(right shoulder)  Skin Integumentary  Skin Color: Appropriate for ethnicity  Skin Condition/Temp: Dry;Warm  Skin Integrity: Incision (comment)(right shoulder)  Turgor: Non-tenting  Hair Growth: Present  Varicosities: Absent     Strength:    Strength: Within functional limits    Tone & Sensation:   Tone: Normal    Sensation: Intact        Range Of Motion:  AROM: Within functional limits    Functional Mobility:  Bed Mobility:     Supine to Sit: Stand-by assistance  Sit to Supine: Stand-by assistance  Scooting: Stand-by assistance  Transfers:  Sit to Stand: Contact guard assistance  Stand to Sit: Contact guard assistance    Balance:   Sitting: Intact  Standing: Impaired; Without support    Ambulation/Gait Training:  Distance (ft): 15 Feet (ft)  Assistive Device: Other (comment)(none )  Ambulation - Level of Assistance: Contact guard assistance        Gait Abnormalities: Decreased step clearance        Base of Support: Widened     Speed/Marii: Slow;Shuffled  Step Length: Left shortened;Right shortened    Pain:  Pain level pre-treatment: 0/10   Pain level post-treatment: 0/10   Pt reports numbness to R shoulder during session, donned with ice pack at end Activity Tolerance:   Good    Please refer to the flowsheet for vital signs taken during this treatment. After treatment:   []         Patient left in no apparent distress sitting up in chair  [x]         Patient left in no apparent distress in bed  [x]         Call bell left within reach  [x]         Nursing notified  []         Caregiver present  []         Bed alarm activated  []         SCDs applied    COMMUNICATION/EDUCATION:   [x]         Role of Physical Therapy in the acute care setting. [x]         Fall prevention education was provided and the patient/caregiver indicated understanding. [x]         Patient/family have participated as able in goal setting and plan of care. [x]         Patient/family agree to work toward stated goals and plan of care. []         Patient understands intent and goals of therapy, but is neutral about his/her participation. []         Patient is unable to participate in goal setting/plan of care: ongoing with therapy staff.  []         Other:     Thank you for this referral.  Kenna Mercer   Time Calculation: 23 mins      Eval Complexity: History: LOW Complexity : Zero comorbidities / personal factors that will impact the outcome / POCExam:LOW Complexity : 1-2 Standardized tests and measures addressing body structure, function, activity limitation and / or participation in recreation  Presentation: LOW Complexity : Stable, uncomplicated  Clinical Decision Making:Low Complexity    Overall Complexity:LOW

## 2020-05-15 NOTE — PROGRESS NOTES
Date of Surgery Update: Prasad Hernandez was seen and examined. History and physical has been reviewed. The patient has been examined. There have been no significant clinical changes since the completion of the originally dated History and Physical.    Signed By: Caprice Chua MD     May 15, 2020 7:21 AM       The above patient was independently seen and examined by myself. The case was then discussed and a proper diagnosis/plan was made. I agree with the above assessment.

## 2020-05-15 NOTE — PROGRESS NOTES
conducted a pre-surgery visit with Maia Aguilar, who is a 77 y.o.,female. The  provided the following Interventions:  Initiated a relationship of care and support. Plan:  Chaplains will continue to follow and will provide pastoral care on an as needed/requested basis.  recommends bedside caregivers page  on duty if patient shows signs of acute spiritual or emotional distress.     [address]  [phone]

## 2020-05-15 NOTE — ROUTINE PROCESS
Received report from [facility]. Pt AAOx3, NAD, breathing non labored, on room air, HOB up. IV site clean, dry and intact. Right arm on a sling. Bed at the lowest level on lock position, call bell w/i reach. Bedside and Verbal shift change report given to REGIS Snyder (oncoming nurse) by me (offgoing nurse). Report included the following information SBAR, Kardex, Procedure Summary, Intake/Output, MAR and Recent Results.

## 2020-05-15 NOTE — PROGRESS NOTES
Reason for Admission:  Nontraumatic complete tear of right rotator cuff [M75.121]  Right rotator cuff tear arthropathy [M75.101, M12.811]                 RRAT Score:    7%            Plan for utilizing home health:    Yes, requesting [address]                      Likelihood of Readmission:   LOW                         Transition of Care Plan:   Return home to            Initial assessment completed with patient. Cognitive status of patient: oriented to time, place, person and situation. Face sheet information confirmed:  yes. The patient designates daughter, Jessica Harding to participate in her discharge plan and to receive any needed information. This patient lives in a single family home with spouse. Patient is able to navigate steps as needed. Prior to hospitalization, patient was considered to be independent with ADLs/IADLS : yes but will now need assist with bathing and dressing. Patient has a current ACP document on file: no  The daughter will be available to transport patient home upon discharge. The patient has no medical equipment available in the home. Patient is not currently active with home health. Patient has not stayed in a skilled nursing facility or rehab. This patient is on dialysis :no    List of available Home Health agencies were provided and reviewed with the patient prior to discharge. Freedom of choice signed: yes, for [address]. Currently, the discharge plan is Home with [address] [place] [address] Candelario Pantoja. The patient states that she can obtain her medications from the pharmacy, and take her medications as directed. Patient's current insurance is Medicare and . Plan was for patient to go home with outpatient physical/occupational therapy but pt is requesting Home Health instead. Her  doesn't drive and she is unable to now; doesn't want to have to ask her children to take time off work to drive her to and from her therapy appts.        Care Management Interventions  PCP Verified by CM: Yes(Dr Samano at [facility])  Mode of Transport at Discharge: Self  Transition of Care Consult (CM Consult): Discharge Planning, [address]: No  Reason Outside [place]: Patient already serviced by other home care/hospice agency(pt wants [facility] as she has used them in the past)  Current Support Network: Lives with Spouse  Confirm Follow Up Transport: Family  The Patient and/or Patient Representative was Provided with a Choice of Provider and Agrees with the Discharge Plan?: Yes  Freedom of Choice List was Provided with Basic Dialogue that Supports the Patient's Individualized Plan of Care/Goals, Treatment Preferences and Shares the Quality Data Associated with the Providers?: Yes  Discharge Location  Discharge Placement: Home with home health        Fredick Kussmaul RN - Outcomes Manager  [phone]

## 2020-05-16 LAB
BASOPHILS # BLD: 0 K/UL (ref 0–0.1)
BASOPHILS NFR BLD: 0 % (ref 0–2)
CK MB CFR SERPL CALC: 2.8 % (ref 0–4)
CK MB SERPL-MCNC: 10.6 NG/ML (ref 5–25)
CK SERPL-CCNC: 384 U/L (ref 26–192)
DIFFERENTIAL METHOD BLD: ABNORMAL
EOSINOPHIL # BLD: 0.1 K/UL (ref 0–0.4)
EOSINOPHIL NFR BLD: 0 % (ref 0–5)
ERYTHROCYTE [DISTWIDTH] IN BLOOD BY AUTOMATED COUNT: 14.6 % (ref 11.6–14.5)
HCT VFR BLD AUTO: 32.5 % (ref 35–45)
HGB BLD-MCNC: 10.5 G/DL (ref 12–16)
LYMPHOCYTES # BLD: 1.9 K/UL (ref 0.9–3.6)
LYMPHOCYTES NFR BLD: 12 % (ref 21–52)
MCH RBC QN AUTO: 27.9 PG (ref 24–34)
MCHC RBC AUTO-ENTMCNC: 32.3 G/DL (ref 31–37)
MCV RBC AUTO: 86.4 FL (ref 74–97)
MONOCYTES # BLD: 2.1 K/UL (ref 0.05–1.2)
MONOCYTES NFR BLD: 13 % (ref 3–10)
NEUTS SEG # BLD: 11.9 K/UL (ref 1.8–8)
NEUTS SEG NFR BLD: 75 % (ref 40–73)
PLATELET # BLD AUTO: 236 K/UL (ref 135–420)
PMV BLD AUTO: 9.4 FL (ref 9.2–11.8)
RBC # BLD AUTO: 3.76 M/UL (ref 4.2–5.3)
TROPONIN I SERPL-MCNC: <0.02 NG/ML (ref 0–0.04)
WBC # BLD AUTO: 16 K/UL (ref 4.6–13.2)

## 2020-05-16 PROCEDURE — 74011250637 HC RX REV CODE- 250/637: Performed by: STUDENT IN AN ORGANIZED HEALTH CARE EDUCATION/TRAINING PROGRAM

## 2020-05-16 PROCEDURE — 74011250636 HC RX REV CODE- 250/636: Performed by: ORTHOPAEDIC SURGERY

## 2020-05-16 PROCEDURE — 85025 COMPLETE CBC W/AUTO DIFF WBC: CPT

## 2020-05-16 PROCEDURE — 36415 COLL VENOUS BLD VENIPUNCTURE: CPT

## 2020-05-16 PROCEDURE — 82550 ASSAY OF CK (CPK): CPT

## 2020-05-16 PROCEDURE — 74011250636 HC RX REV CODE- 250/636: Performed by: STUDENT IN AN ORGANIZED HEALTH CARE EDUCATION/TRAINING PROGRAM

## 2020-05-16 PROCEDURE — 97165 OT EVAL LOW COMPLEX 30 MIN: CPT

## 2020-05-16 PROCEDURE — 74011250637 HC RX REV CODE- 250/637: Performed by: PHYSICIAN ASSISTANT

## 2020-05-16 PROCEDURE — 97530 THERAPEUTIC ACTIVITIES: CPT

## 2020-05-16 PROCEDURE — 74011250637 HC RX REV CODE- 250/637: Performed by: ORTHOPAEDIC SURGERY

## 2020-05-16 PROCEDURE — 93005 ELECTROCARDIOGRAM TRACING: CPT

## 2020-05-16 PROCEDURE — 65270000029 HC RM PRIVATE

## 2020-05-16 PROCEDURE — 74011250636 HC RX REV CODE- 250/636

## 2020-05-16 RX ORDER — SODIUM CHLORIDE 9 MG/ML
500 INJECTION INTRAMUSCULAR; INTRAVENOUS; SUBCUTANEOUS ONCE
Status: DISCONTINUED | OUTPATIENT
Start: 2020-05-16 | End: 2020-05-16

## 2020-05-16 RX ORDER — PANTOPRAZOLE SODIUM 40 MG/1
40 TABLET, DELAYED RELEASE ORAL ONCE
Status: COMPLETED | OUTPATIENT
Start: 2020-05-16 | End: 2020-05-16

## 2020-05-16 RX ORDER — SODIUM CHLORIDE 9 MG/ML
500 INJECTION, SOLUTION INTRAVENOUS ONCE
Status: COMPLETED | OUTPATIENT
Start: 2020-05-16 | End: 2020-05-16

## 2020-05-16 RX ORDER — SODIUM CHLORIDE 9 MG/ML
500 INJECTION, SOLUTION INTRAVENOUS ONCE
Status: DISPENSED | OUTPATIENT
Start: 2020-05-16 | End: 2020-05-16

## 2020-05-16 RX ORDER — KETOROLAC TROMETHAMINE 15 MG/ML
INJECTION, SOLUTION INTRAMUSCULAR; INTRAVENOUS
Status: COMPLETED
Start: 2020-05-16 | End: 2020-05-16

## 2020-05-16 RX ORDER — KETOROLAC TROMETHAMINE 15 MG/ML
15 INJECTION, SOLUTION INTRAMUSCULAR; INTRAVENOUS EVERY 6 HOURS
Status: DISPENSED | OUTPATIENT
Start: 2020-05-16 | End: 2020-05-16

## 2020-05-16 RX ADMIN — OXYCODONE 5 MG: 5 TABLET ORAL at 22:10

## 2020-05-16 RX ADMIN — KETOROLAC TROMETHAMINE 15 MG: 15 INJECTION, SOLUTION INTRAMUSCULAR; INTRAVENOUS at 06:53

## 2020-05-16 RX ADMIN — CEFAZOLIN SODIUM 2 G: 2 SOLUTION INTRAVENOUS at 05:46

## 2020-05-16 RX ADMIN — SENNOSIDES, DOCUSATE SODIUM 1 TABLET: 50; 8.6 TABLET, FILM COATED ORAL at 08:37

## 2020-05-16 RX ADMIN — MELOXICAM 15 MG: 7.5 TABLET ORAL at 08:37

## 2020-05-16 RX ADMIN — ASPIRIN 81 MG: 81 TABLET, COATED ORAL at 08:38

## 2020-05-16 RX ADMIN — FLUTICASONE PROPIONATE 2 SPRAY: 50 SPRAY, METERED NASAL at 09:00

## 2020-05-16 RX ADMIN — KETOROLAC TROMETHAMINE 15 MG: 15 INJECTION, SOLUTION INTRAMUSCULAR; INTRAVENOUS at 11:10

## 2020-05-16 RX ADMIN — POLYETHYLENE GLYCOL 3350 17 G: 17 POWDER, FOR SOLUTION ORAL at 08:38

## 2020-05-16 RX ADMIN — KETOROLAC TROMETHAMINE 15 MG: 15 INJECTION, SOLUTION INTRAMUSCULAR; INTRAVENOUS at 01:46

## 2020-05-16 RX ADMIN — OXYCODONE 5 MG: 5 TABLET ORAL at 04:11

## 2020-05-16 RX ADMIN — ONDANSETRON 8 MG: 2 INJECTION INTRAMUSCULAR; INTRAVENOUS at 06:48

## 2020-05-16 RX ADMIN — Medication 1 TABLET: at 08:37

## 2020-05-16 RX ADMIN — LORATADINE 10 MG: 10 TABLET ORAL at 08:37

## 2020-05-16 RX ADMIN — OXYCODONE 5 MG: 5 TABLET ORAL at 18:16

## 2020-05-16 RX ADMIN — ACETAMINOPHEN 1000 MG: 500 TABLET, FILM COATED ORAL at 05:52

## 2020-05-16 RX ADMIN — ACETAMINOPHEN 1000 MG: 500 TABLET, FILM COATED ORAL at 14:35

## 2020-05-16 RX ADMIN — PANTOPRAZOLE SODIUM 40 MG: 40 TABLET, DELAYED RELEASE ORAL at 03:50

## 2020-05-16 RX ADMIN — SODIUM CHLORIDE 500 ML: 900 INJECTION, SOLUTION INTRAVENOUS at 03:36

## 2020-05-16 RX ADMIN — OXYCODONE 5 MG: 5 TABLET ORAL at 12:54

## 2020-05-16 RX ADMIN — ACETAMINOPHEN 1000 MG: 500 TABLET, FILM COATED ORAL at 22:02

## 2020-05-16 NOTE — PROGRESS NOTES
[facility]    Progress Note      Patient: Dinah Grandcedrick               Sex: female          DOA: 5/15/2020         YOB: 1955      Age:  77 y.o.        LOS:  LOS: 1 day         S/P  Procedure(s):  RIGHT REVERSE TOTAL SHOULDER REPLACEMENT               Subjective: Tolerating diet Ambulating Nausea Moderate pain Voiding q shift. A RR was called last night, pt given bolus of fluids due to hypotension. She has not had any pain medication due to hypotension so she reports moderate pain. She has been compliant with her sling and restrictions. Denies cp, sob, abd pain   Objective:      Blood pressure 99/58, pulse 66, temperature 97.5 °F (36.4 °C), resp. rate 14, height 5' 7\" (1.702 m), weight 135 lb (61.2 kg), SpO2 95 %.    Well developed, Well Nourished alert, cooperative, no distress  Incision clean, dry, no drainage, dressing in place  Minimal swelling and tenderness noted in right upper extremity (shoulder)  Sensory is intact   Motor is intact  nv intact  2+distal pulses  Neg calf tenderness  Neg for facial asymmetry     Labs:  CBC  @  CBC:   Lab Results   Component Value Date/Time    WBC 16.0 (H) 05/16/2020 03:40 AM    RBC 3.76 (L) 05/16/2020 03:40 AM    HGB 10.5 (L) 05/16/2020 03:40 AM    HCT 32.5 (L) 05/16/2020 03:40 AM    PLATELET 540 64/95/2063 03:40 AM   @  Coagulation  Lab Results   Component Value Date    INR 1.0 05/12/2020    APTT 35.6 05/12/2020      Basic Metabolic Profile  Lab Results   Component Value Date     05/12/2020    CO2 29 05/12/2020    BUN 19 (H) 05/12/2020       Medications Reviewed      Assessment/Plan     Active Problems:    Right rotator cuff tear arthropathy (5/15/2020)        Procedure(s):  RIGHT REVERSE TOTAL SHOULDER REPLACEMENT :     1. PT and/or OT Consults: YES continue PT/OT: oob to chair, PROM operated shoulder, no external rotation, AROM elbow and wrist, ok to remove sling while in bed 2. Incision Care:  Routine Incision Care and Dressing Changes as necessary: dressing changes POD#2 and as needed  3. Pain control  4. DVT Prophylaxis - aspirin 81 mg, SCD in place    DISCHARGE PLANNING     Intervention : Home with H/H tomorrow.         [facility]  [phone]

## 2020-05-16 NOTE — PROGRESS NOTES
Problem: Self Care Deficits Care Plan (Adult)  Goal: *Acute Goals and Plan of Care (Insert Text)  Description: Occupational Therapy Goals  Initiated 5/16/2020 within 7 day(s). 1.  Patient will perform UB dressing with modified independence  2. Patient will don and doff sling with SBA  3. Patient will demonstrate independence with RUE program (including precautions, positioning and ROM program)   Outcome: Progressing Towards Goal    OCCUPATIONAL THERAPY EVALUATION    Patient: Obed Silva (4 y.o. female)  Date: 5/16/2020  Primary Diagnosis: Nontraumatic complete tear of right rotator cuff [M75.121]  Right rotator cuff tear arthropathy [M75.101, M12.811]  Procedure(s) (LRB):  RIGHT REVERSE TOTAL SHOULDER REPLACEMENT (Right) 1 Day Post-Op   Precautions: Fall  PLOF: she reports that she was independent with her self care and her IADL's    ASSESSMENT :  Based on the objective data described below, the patient presents with LUE in a sling and in pain; this limits her independence with her self care and efficiency with her functional mobility. Patient will benefit from skilled intervention to address the above impairments.   Patient's rehabilitation potential is considered to be Good  Factors which may influence rehabilitation potential include:   []             None noted  []             Mental ability/status  [x]             Medical condition (low BP)  []             Home/family situation and support systems  []             Safety awareness  [x]             Pain tolerance/management  []             Other:      PLAN :  Recommendations and Planned Interventions:   [x]               Self Care Training                  [x]      Therapeutic Activities  []               Functional Mobility Training   []      Cognitive Retraining  [x]               Therapeutic Exercises           []      Endurance Activities  []               Balance Training                    []      Neuromuscular Re-Education  [] Visual/Perceptual Training     [x]      Home Safety Training  []               Patient Education                   [x]      Family Training/Education  []               Other (comment):    Frequency/Duration: Patient will be followed by occupational therapy 1-2 times per day/4-7 days per week to address goals. Discharge Recommendations: Home Health  Further Equipment Recommendations for Discharge: N/A     SUBJECTIVE:   Patient stated It just hurts.  she is referring to her RUE    OBJECTIVE DATA SUMMARY:     Past Medical History:   Diagnosis Date    Arthritis     Fracture 2018    right foot - no surgery    Hepatitis A     IBS (irritable bowel syndrome)      Past Surgical History:   Procedure Laterality Date    HAND/FINGER SURGERY UNLISTED      HX  SECTION      HX ORTHOPAEDIC Bilateral     trigger finger release    HX ORTHOPAEDIC Right 2002    biceps tendon repair     Barriers to Learning/Limitations: none    Home Situation:   Home Situation  Home Environment: Private residence  One/Two Story Residence: One story  Living Alone: No  Support Systems: Spouse/Significant Other/Partner, Family member(s)  Patient Expects to be Discharged to[de-identified] Private residence  Current DME Used/Available at Home: None  [x]  Right hand dominant   []  Left hand dominant    Cognitive/Behavioral Status:  Neurologic State: Alert; Appropriate for age  Orientation Level: Oriented X4  Skin: no skin integrity issues noted during OT session  Edema: no UE edema noted    Vision/Perceptual:      Tracking is WFL     Coordination: BUE   LUE WFL, right hand is WFL   Balance:  Sitting: Intact; Without support  Standing: Impaired; Without support  Standing - Static: Good  Standing - Dynamic : Fair    Strength: BUE  LUE: 4/5  Right hand : 3+/5   Tone & Sensation: BUE  WFL in available range   Range of Motion: BUE  LUE: AROM WFL  RUE: PROM WFL with the exception of shoulder flexion 0 - 25' and elbow flexion (secondary to her anticipation of pain); AROM WFL (with the exception of none in her shoulder 2' precautions) and elbow flexion 0 - 100' secondary to her anticipation of pain     Functional Mobility and Transfers for ADLs:  Bed Mobility:     Scooting: Contact guard assistance;Minimum assistance  Transfers:  Sit to Stand: Contact guard assistance  Stand to Sit: Contact guard assistance   ADL Assessment:    Self feeding: set up and SBA  Grooming: set up and SBA  UB bathing/dressing: min/mod assist (improved to SBA following training)  LB bathing/dressing: min/mod assist     ADL Intervention:   ADL retraining in one handed techniques focusing on UB self care including sling donning and doffing as well as self feeding equipment recommendations  Therapeutic Exercise:  PROM Right shoulder and AROM elbow, wrist and hand with home program issued for her activity recommendations thus far    Pain:  Pain level pre-treatment: 8/10   Pain level post-treatment: 7/10   Pain Intervention(s): Medication (see MAR); Rest, Ice, Repositioning (sling adjusted for comfort and support as well as she was instructed on pillow use for additional support)   Activity Tolerance:   No SOB noted  Please refer to the flowsheet for vital signs taken during this treatment. After treatment:   [x] Patient left in no apparent distress sitting up in chair  [] Patient left in no apparent distress in bed  [x] Call bell left within reach  [] Nursing notified  [] Caregiver present  [] Bed alarm activated    COMMUNICATION/EDUCATION:   [x] Role of Occupational Therapy in the acute care setting  [x] Home safety education was provided and the patient/caregiver indicated understanding. [x] Patient/family have participated as able in goal setting and plan of care. [x] Patient/family agree to work toward stated goals and plan of care. [] Patient understands intent and goals of therapy, but is neutral about his/her participation.   [] Patient is unable to participate in goal setting and plan of care.    Thank you for this referral.  Amando Isaac OTR/ERLINDA  Time Calculation: 24 mins    Eval Complexity: History: LOW Complexity : Brief history review ; Examination: LOW Complexity : 1-3 performance deficits relating to physical, cognitive , or psychosocial skils that result in activity limitations and / or participation restrictions ;    Decision Making:LOW Complexity : No comorbidities that affect functional and no verbal or physical assistance needed to complete eval tasks

## 2020-05-16 NOTE — PROGRESS NOTES
Problem: Mobility Impaired (Adult and Pediatric)  Goal: *Acute Goals and Plan of Care (Insert Text)  Description: Physical Therapy Goals  Initiated 5/15/2020 and to be accomplished within 7 day(s)  1. Patient will move from supine to sit and sit to supine , scoot up and down and roll side to side in bed with modified independence. 2.  Patient will transfer from bed to chair and chair to bed with modified independence using the least restrictive device. 3.  Patient will perform sit to stand with modified independence. 4.  Patient will ambulate with modified independence for 75 feet with the least restrictive device. 5.  Patient will ascend/descend 5 stairs with 1 handrail(s) with supervision/set-up. PLOF: Pt lives with her  in a one story home with 3-5 SHANNA with 1 HR, was ind PTA, does not have any DME. Outcome: Progressing Towards Goal   PHYSICAL THERAPY TREATMENT    Patient: Augustin Poe (1 y.o. female)  Date: 5/16/2020  Diagnosis: Nontraumatic complete tear of right rotator cuff [M75.121]  Right rotator cuff tear arthropathy [M75.101, M12.811]   <principal problem not specified>  Procedure(s) (LRB):  RIGHT REVERSE TOTAL SHOULDER REPLACEMENT (Right) 1 Day Post-Op  Precautions: Fall. NWB RUE, no PROM shoulder, AROM wrist/hand, sling off in bed if desired.    NWB RUE, no PROM shoulder, AROM wrist/hand, sling off in bed if desired.        ASSESSMENT:  Patient with episode of low BP over the night with rapid response for hypotension but recovered. Nurse consulted this AM and patient cleared for participation in PT. Patient received sitting up at EOB, finished with breakfast, RUE in sling, awake and alert, agreeable to PT treatment. PT reviewed precautions with patient including no AROM to right shoulder but gentle ROM to hand, fingers, and elbow. Patient demonstrated understanding. Vitals at rest: BP 93/62 mmHg, HR 77 bpm, SpO2 96% on RA.  Completed sit to stand with CGA but with max cues for scooting to EOB and using LUE to push up from bed as patient wanted to be pulled up. Vitals taken in standing for orthostatics prior to ambulation. Patient became nauseated in standing and needed to sit down in bedside chair. BP 78/52 mmHg. Nurse came in room at this time and assisted. Patient reclined in chair with legs elevated and educated to do ankle pumps. Nurse assessing patient with PT. BP 75/52 mmHg after 3 minutes then recovered to 99/58 mmHg after 10 minutes. PT gave patient cold cloth for forehead. Patient educated on ankle pumps and heel slides to promote circulation, strength, and BP. Gait and stairs deferred today due to low BP. Continue to recommend Home Health PT. Progression toward goals:   []      Improving appropriately and progressing toward goals  [x]      Improving slowly and progressing toward goals; limited by hypotension  []      Not making progress toward goals and plan of care will be adjusted     PLAN:  Patient continues to benefit from skilled intervention to address the above impairments. Continue treatment per established plan of care. Discharge Recommendations:  Home Health and family support  Further Equipment Recommendations for Discharge:  N/A     SUBJECTIVE:   Patient stated I hurt. That block wore off.     OBJECTIVE DATA SUMMARY:   Critical Behavior:  Neurologic State: Alert  Orientation Level: Oriented X4  Cognition: Appropriate decision making, Follows commands  Safety/Judgement: Fall prevention  Functional Mobility Training:  Bed Mobility:  Scooting: Contact guard assistance;Minimum assistance     Transfers:  Sit to Stand: Contact guard assistance  Stand to Sit: Contact guard assistance     Balance:  Sitting: Intact; Without support  Standing: Impaired; Without support  Standing - Static: Good  Standing - Dynamic : Fair   Range Of Motion:    Encouraged to perform right hand and finger ROM      Ambulation/Gait Training:  Distance (ft): 5 Feet (ft) bed to chair  Assistive Device: Other (comment)(none)  Ambulation - Level of Assistance: Contact guard assistance  Gait Abnormalities: Decreased step clearance  Base of Support: (WNL)   Speed/Marii: Shuffled; Slow  Step Length: Left shortened;Right shortened  Therapeutic Exercises: Ankle pumps x 40, heel slides x 10 each side, seated marches x 10 each        Pain:  Pain level pre-treatment: 6/10  Pain level post-treatment: 6/10   Pain Intervention(s): Medication (see MAR); Rest, Repositioning   Response to intervention: Nurse notified, See doc flow    Activity Tolerance:   Limited due to hypotension  Please refer to the flowsheet for vital signs taken during this treatment. After treatment:   [x] Patient left in no apparent distress sitting up in chair  [] Patient left in no apparent distress in bed  [x] Call bell left within reach  [x] Nursing notified  [] Caregiver present  [] Bed alarm activated  [] SCDs applied      COMMUNICATION/EDUCATION:   [x]         Role of Physical Therapy in the acute care setting. [x]         Fall prevention education was provided and the patient/caregiver indicated understanding. [x]         Patient/family have participated as able in working toward goals and plan of care. [x]         Patient/family agree to work toward stated goals and plan of care. []         Patient understands intent and goals of therapy, but is neutral about his/her participation.   []         Patient is unable to participate in stated goals/plan of care: ongoing with therapy staff.  []         Other:        Thomas Major DPT   Time Calculation: 23 mins

## 2020-05-16 NOTE — PROGRESS NOTES
Problem: Falls - Risk of  Goal: *Absence of Falls  Description: Document Otoniel Richardson Fall Risk and appropriate interventions in the flowsheet.   Outcome: Progressing Towards Goal  Note: Fall Risk Interventions:  Mobility Interventions: Communicate number of staff needed for ambulation/transfer, Patient to call before getting OOB, Utilize walker, cane, or other assistive device         Medication Interventions: Assess postural VS orthostatic hypotension, Patient to call before getting OOB, Teach patient to arise slowly    Elimination Interventions: Call light in reach, Patient to call for help with toileting needs              Problem: Patient Education: Go to Patient Education Activity  Goal: Patient/Family Education  Outcome: Progressing Towards Goal     Problem: Patient Education: Go to Patient Education Activity  Goal: Patient/Family Education  Outcome: Progressing Towards Goal     Problem: Pain  Goal: *Control of Pain  Outcome: Progressing Towards Goal       Problem: Patient Education: Go to Patient Education Activity  Goal: Patient/Family Education  Outcome: Progressing Towards Goal

## 2020-05-16 NOTE — ROUTINE PROCESS
Bedside shift change report given to Andrea Landa RN (oncoming nurse) by Pasha Muñoz RN (offgoing nurse). Report included the following information SBAR, Kardex, Procedure Summary, Intake/Output, MAR, Recent Results and Med Rec Status.

## 2020-05-16 NOTE — PROGRESS NOTES
Problem: Falls - Risk of  Goal: *Absence of Falls  Description: Document Silvino Lange Fall Risk and appropriate interventions in the flowsheet.   Outcome: Progressing Towards Goal  Note: Fall Risk Interventions:  Mobility Interventions: Assess mobility with egress test, Bed/chair exit alarm, Communicate number of staff needed for ambulation/transfer, OT consult for ADLs, Patient to call before getting OOB, PT Consult for mobility concerns, PT Consult for assist device competence, Strengthening exercises (ROM-active/passive)         Medication Interventions: Assess postural VS orthostatic hypotension, Bed/chair exit alarm, Evaluate medications/consider consulting pharmacy, Patient to call before getting OOB, Teach patient to arise slowly    Elimination Interventions: Bed/chair exit alarm, Call light in reach, Elevated toilet seat, Patient to call for help with toileting needs, Stay With Me (per policy), Toilet paper/wipes in reach, Toileting schedule/hourly rounds              Problem: Patient Education: Go to Patient Education Activity  Goal: Patient/Family Education  Outcome: Progressing Towards Goal     Problem: Patient Education: Go to Patient Education Activity  Goal: Patient/Family Education  Outcome: Progressing Towards Goal     Problem: Pain  Goal: *Control of Pain  Outcome: Progressing Towards Goal  Goal: *PALLIATIVE CARE:  Alleviation of Pain  Outcome: Progressing Towards Goal     Problem: Patient Education: Go to Patient Education Activity  Goal: Patient/Family Education  Outcome: Progressing Towards Goal

## 2020-05-16 NOTE — ROUTINE PROCESS
Bedside shift change report given to Bell Hebert RN (oncoming nurse) by Dion Laurent RN (offgoing nurse). Report included the following information SBAR, Kardex, Procedure Summary, Intake/Output, MAR, Recent Results and Med Rec Status.

## 2020-05-16 NOTE — PROGRESS NOTES
Rapid Response Note  [facility] Medicine    Patient: Maia Aguilar 77 y.o. female  [id number]  1955      Admit Date: 5/15/2020   Admission Diagnosis: Nontraumatic complete tear of right rotator cuff [M75.121]  Right rotator cuff tear arthropathy [M75.101, M12.811]    RAPID RESPONSE     Rapid response called for hypotension. Medications Reviewed    OBJECTIVE     Visit Vitals  BP 99/58   Pulse 75   Temp 97.7 °F (36.5 °C)   Resp 18   Ht 5' 7\" (1.702 m)   Wt 61.2 kg (135 lb)   SpO2 97%   BMI 21.14 kg/m²       PHYSICAL:  General:  Alert and Responsive and in no acute distress. CV:  RRR, no murmurs, rubs, or gallops. No LE edema  RESP:  Unlabored breathing. Lungs clear to auscultation. no wheeze, rales, or rhonchi. Equal expansion bilaterally. ABD:  Soft, nontender, nondistended. Normoactive bowel sounds. No suprapubic tenderness. Neuro:  CN II-XII intact. A+Ox3. Moving all extremities except R arm in sling per ortho instructions, moving fingers on RUE      ASSESSMENT, PLAN & DISPOSITION   Maia Aguilar is a 77y.o. year old female admitted for Nontraumatic complete tear of right rotator cuff [M75.121]  Right rotator cuff tear arthropathy [M75.101, M12.811]. Patient is a 77year old otherwise healthy F who is s/p post right shoulder arthroplasty. Rapid response called for hypotension. Patients BP noted to be down to 21'E systolic prior to rapid response being called. Upon arrival to rapid response BPs in 90s-100s/50s. She was sating 98%. HR 70s. Patient denies any chest pain or shortness of breath but does report some nausea which is now resolved. She recently received a Toradol injection for pain. Morphine was held due to borderline low pressures. Otherwise her last pain medication was early yesterday. She reports some shoulder pain now. Reviewed lab work from 5/12/20 which shows normal h/h. Her BPs have been borderline low during admission mostly in 55I-218L systolic.      Hypotension may have been in setting of hypovolemia, vasovagal response in setting of pain or less likely ACS. Would recommend holding opiates given her borderline low pressures. Patient condition currently: stable. Medications administered: normal saline 500 cc     EKG: NSR no blocks or ST changes    Labs: cbc, bmp, cardiac panel     Disposition: [facility]     Attending service Dr. Bobby Dunbar notified of rapid response. In agreement with plan and plans to consult hospitalist. Primary team resuming care.      Pedro Huber MD, PGY-1  3:24 AM 05/16/20

## 2020-05-17 VITALS
RESPIRATION RATE: 16 BRPM | DIASTOLIC BLOOD PRESSURE: 55 MMHG | OXYGEN SATURATION: 94 % | HEART RATE: 68 BPM | BODY MASS INDEX: 21.19 KG/M2 | HEIGHT: 67 IN | SYSTOLIC BLOOD PRESSURE: 94 MMHG | TEMPERATURE: 97.5 F | WEIGHT: 135 LBS

## 2020-05-17 LAB
ANION GAP SERPL CALC-SCNC: 2 MMOL/L (ref 3–18)
ATRIAL RATE: 67 BPM
BUN SERPL-MCNC: 22 MG/DL (ref 7–18)
BUN/CREAT SERPL: 31 (ref 12–20)
CALCIUM SERPL-MCNC: 8.4 MG/DL (ref 8.5–10.1)
CALCULATED P AXIS, ECG09: 80 DEGREES
CALCULATED R AXIS, ECG10: 75 DEGREES
CALCULATED T AXIS, ECG11: 35 DEGREES
CHLORIDE SERPL-SCNC: 105 MMOL/L (ref 100–111)
CO2 SERPL-SCNC: 28 MMOL/L (ref 21–32)
CREAT SERPL-MCNC: 0.7 MG/DL (ref 0.6–1.3)
DIAGNOSIS, 93000: NORMAL
ERYTHROCYTE [DISTWIDTH] IN BLOOD BY AUTOMATED COUNT: 14.8 % (ref 11.6–14.5)
GLUCOSE SERPL-MCNC: 100 MG/DL (ref 74–99)
HCT VFR BLD AUTO: 31.5 % (ref 35–45)
HGB BLD-MCNC: 10.1 G/DL (ref 12–16)
MCH RBC QN AUTO: 27.9 PG (ref 24–34)
MCHC RBC AUTO-ENTMCNC: 32.1 G/DL (ref 31–37)
MCV RBC AUTO: 87 FL (ref 74–97)
P-R INTERVAL, ECG05: 192 MS
PLATELET # BLD AUTO: 225 K/UL (ref 135–420)
PMV BLD AUTO: 9.8 FL (ref 9.2–11.8)
POTASSIUM SERPL-SCNC: 4.7 MMOL/L (ref 3.5–5.5)
Q-T INTERVAL, ECG07: 404 MS
QRS DURATION, ECG06: 84 MS
QTC CALCULATION (BEZET), ECG08: 426 MS
RBC # BLD AUTO: 3.62 M/UL (ref 4.2–5.3)
SODIUM SERPL-SCNC: 135 MMOL/L (ref 136–145)
VENTRICULAR RATE, ECG03: 67 BPM
WBC # BLD AUTO: 12.4 K/UL (ref 4.6–13.2)

## 2020-05-17 PROCEDURE — 0RRJ00Z REPLACEMENT OF RIGHT SHOULDER JOINT WITH REVERSE BALL AND SOCKET SYNTHETIC SUBSTITUTE, OPEN APPROACH: ICD-10-PCS | Performed by: ORTHOPAEDIC SURGERY

## 2020-05-17 PROCEDURE — 74011250637 HC RX REV CODE- 250/637: Performed by: ORTHOPAEDIC SURGERY

## 2020-05-17 PROCEDURE — 74011250636 HC RX REV CODE- 250/636: Performed by: ORTHOPAEDIC SURGERY

## 2020-05-17 PROCEDURE — 80048 BASIC METABOLIC PNL TOTAL CA: CPT

## 2020-05-17 PROCEDURE — 74011250637 HC RX REV CODE- 250/637: Performed by: PHYSICIAN ASSISTANT

## 2020-05-17 PROCEDURE — 36415 COLL VENOUS BLD VENIPUNCTURE: CPT

## 2020-05-17 PROCEDURE — 85027 COMPLETE CBC AUTOMATED: CPT

## 2020-05-17 RX ADMIN — LORATADINE 10 MG: 10 TABLET ORAL at 08:20

## 2020-05-17 RX ADMIN — PANTOPRAZOLE SODIUM 40 MG: 40 TABLET, DELAYED RELEASE ORAL at 08:20

## 2020-05-17 RX ADMIN — MORPHINE SULFATE 1 MG: 2 INJECTION, SOLUTION INTRAMUSCULAR; INTRAVENOUS at 01:47

## 2020-05-17 RX ADMIN — Medication 1 TABLET: at 08:19

## 2020-05-17 RX ADMIN — ACETAMINOPHEN 1000 MG: 500 TABLET, FILM COATED ORAL at 06:31

## 2020-05-17 RX ADMIN — MELOXICAM 15 MG: 7.5 TABLET ORAL at 08:19

## 2020-05-17 RX ADMIN — POLYETHYLENE GLYCOL 3350 17 G: 17 POWDER, FOR SOLUTION ORAL at 08:18

## 2020-05-17 RX ADMIN — FLUTICASONE PROPIONATE 2 SPRAY: 50 SPRAY, METERED NASAL at 09:00

## 2020-05-17 RX ADMIN — ASPIRIN 81 MG: 81 TABLET, COATED ORAL at 08:20

## 2020-05-17 RX ADMIN — SENNOSIDES, DOCUSATE SODIUM 1 TABLET: 50; 8.6 TABLET, FILM COATED ORAL at 08:19

## 2020-05-17 RX ADMIN — OXYCODONE 5 MG: 5 TABLET ORAL at 08:20

## 2020-05-17 RX ADMIN — ONDANSETRON 8 MG: 2 INJECTION INTRAMUSCULAR; INTRAVENOUS at 02:49

## 2020-05-17 NOTE — PROGRESS NOTES
[facility]    Progress Note      Patient: Linsey Ruiz               Sex: female          DOA: 5/15/2020         YOB: 1955      Age:  77 y.o.        LOS:  LOS: 2 days         S/P  Procedure(s):  RIGHT REVERSE TOTAL SHOULDER REPLACEMENT               Subjective: Tolerating diet Ambulating Nausea Moderate pain Voiding q shift. Patient is feeling better since the rapid response on Friday night. She reports to be tolerating her pain medication without any difficulty. She denies any lightheadedness or dizziness. Worst pain to be controlled on her shoulder. Denies cp, sob, abd pain   Objective:      Blood pressure 91/55, pulse 72, temperature 98.3 °F (36.8 °C), resp. rate 16, height 5' 7\" (1.702 m), weight 135 lb (61.2 kg), SpO2 92 %.    Well developed, Well Nourished alert, cooperative, no distress  Incision clean, dry, no drainage, dressing in place  Minimal swelling and tenderness noted in right upper extremity (shoulder)  Sensory is intact   Motor is intact  nv intact  2+distal pulses  Neg calf tenderness  Neg for facial asymmetry     Labs:  CBC  @  CBC:   Lab Results   Component Value Date/Time    WBC 12.4 05/17/2020 05:17 AM    RBC 3.62 (L) 05/17/2020 05:17 AM    HGB 10.1 (L) 05/17/2020 05:17 AM    HCT 31.5 (L) 05/17/2020 05:17 AM    PLATELET 440 84/13/5497 05:17 AM   @  Coagulation  Lab Results   Component Value Date    INR 1.0 05/12/2020    APTT 35.6 05/12/2020      Basic Metabolic Profile  Lab Results   Component Value Date     (L) 05/17/2020    CO2 28 05/17/2020    BUN 22 (H) 05/17/2020       Medications Reviewed      Assessment/Plan     Active Problems:    Right rotator cuff tear arthropathy (5/15/2020)        Procedure(s):  RIGHT REVERSE TOTAL SHOULDER REPLACEMENT :     1. PT and/or OT Consults: YES continue PT/OT: oob to chair, PROM operated shoulder, no external rotation, AROM elbow and wrist, ok to remove sling while in bed 2. Incision Care:  Routine Incision Care and Dressing Changes as necessary: dressing changes POD#2 and as needed  3. Pain control  4.  DVT Prophylaxis - aspirin 81 mg, SCD in place    DISCHARGE PLANNING     Intervention : Home today        Chet Quiroz, [address] [facility]

## 2020-05-17 NOTE — PROGRESS NOTES
Discharge order noted for today. Pt has been accepted to [facility] agency. Met with patient who is agreeable to the transition plan today. Transport has been arranged through daughter. Patient's discharge summary and home health  orders have been forwarded to [facility] home health  agency via [employer]. Updated bedside RN,  to the transition plan. Discharge information has been documented on the AVS. No DME needs per PT.       MARVIN Pappas  Case Management  [phone]

## 2020-05-17 NOTE — ROUTINE PROCESS
Assumed care of patient. Bedside verbal report received from [address] [place] [address], RN including SBAR, MAR, and Kardex. Assessment completed, patient in no apparent distress.

## 2020-05-17 NOTE — ROUTINE PROCESS
Bedside shift change report given to Lisa Munson RN (oncoming nurse) by Paul Graves RN (offgoing nurse). Report included the following information SBAR, Kardex, Intake/Output, MAR, Recent Results and Med Rec Status.

## 2020-05-17 NOTE — PROGRESS NOTES
Problem: Falls - Risk of  Goal: *Absence of Falls  Description: Document Rafael Watson Fall Risk and appropriate interventions in the flowsheet.   Outcome: Progressing Towards Goal  Note: Fall Risk Interventions:  Mobility Interventions: Communicate number of staff needed for ambulation/transfer, Patient to call before getting OOB         Medication Interventions: Assess postural VS orthostatic hypotension, Patient to call before getting OOB, Teach patient to arise slowly    Elimination Interventions: Call light in reach, Patient to call for help with toileting needs              Problem: Patient Education: Go to Patient Education Activity  Goal: Patient/Family Education  Outcome: Progressing Towards Goal     Problem: Patient Education: Go to Patient Education Activity  Goal: Patient/Family Education  Outcome: Progressing Towards Goal     Problem: Pain  Goal: *Control of Pain  Outcome: Progressing Towards Goal       Problem: Patient Education: Go to Patient Education Activity  Goal: Patient/Family Education  Outcome: Progressing Towards Goal     Problem: Patient Education: Go to Patient Education Activity  Goal: Patient/Family Education  Outcome: Progressing Towards Goal

## 2020-05-17 NOTE — PROGRESS NOTES
Problem: Falls - Risk of  Goal: *Absence of Falls  Description: Document Clydene Bone Fall Risk and appropriate interventions in the flowsheet.   Outcome: Progressing Towards Goal  Note: Fall Risk Interventions:  Mobility Interventions: Communicate number of staff needed for ambulation/transfer         Medication Interventions: Assess postural VS orthostatic hypotension    Elimination Interventions: Call light in reach              Problem: Patient Education: Go to Patient Education Activity  Goal: Patient/Family Education  Outcome: Progressing Towards Goal     Problem: Patient Education: Go to Patient Education Activity  Goal: Patient/Family Education  Outcome: Progressing Towards Goal     Problem: Pain  Goal: *Control of Pain  Outcome: Progressing Towards Goal  Goal: *PALLIATIVE CARE:  Alleviation of Pain  Outcome: Progressing Towards Goal     Problem: Patient Education: Go to Patient Education Activity  Goal: Patient/Family Education  Outcome: Progressing Towards Goal     Problem: Patient Education: Go to Patient Education Activity  Goal: Patient/Family Education  Outcome: Progressing Towards Goal

## 2020-05-17 NOTE — ROUTINE PROCESS
Report given to Jae Ornelas RN, including SBAR, MAR, and Kardex. Patient alert and oriented, vitals within normal limits, no apparent distress.

## 2020-05-18 NOTE — DISCHARGE SUMMARY
Discharge Summary    Patient: Ari Goldberg               Sex: female          DOA: 5/15/2020         YOB: 1955      Age:  77 y.o.        LOS:  LOS: 2 days                Admit Date: 5/15/2020    Discharge Date: 5/18/2020    Admission Diagnoses: Nontraumatic complete tear of right rotator cuff [M75.121]  Right rotator cuff tear arthropathy [M75.101, M12.811]    Discharge Diagnoses:    Problem List as of 5/17/2020 Date Reviewed: 5/14/2020          Codes Class Noted - Resolved    Right rotator cuff tear arthropathy ICD-10-CM: M75.101, M12.811  ICD-9-CM: 716.81  5/15/2020 - Present              Discharge Condition: Good    Discharge Destination: Home    Hospital Course: 77 y.o. female who was admitted to hospital following uncomplicated right reverse total shoulder arthroplasty. Was admitted for pain control, prophylactic abx and PT/OT. Rapid response was called POD#0 for hypotension. After a bolus patient vitals stabilized. Patient stayed Saturday for monitoring of labs and vitals. POD#2 pain was controlled, vitals and labs were stable. Patient verbalized readiness to be discharged prior to leaving. Consults: None    Significant Diagnostic Studies: labs: CBC    Discharge Medications:   Cannot display discharge medications since this patient is not currently admitted.       Activity: No heavy lifting, pushing, pulling with the implant side for 2 months, No driving while on analgesics, PT/OT Eval and Treat and See surgical instructions    Diet: Regular Diet    Wound Care: Keep wound clean and dry, Reinforce dressing PRN and Ice to area for comfort    Follow-up: 1 week with ortho  2 weeks with PCP      Benny Runner, PA-C [facility]

## 2020-05-19 ENCOUNTER — TELEPHONE (OUTPATIENT)
Dept: MEDSURG UNIT | Age: 65
End: 2020-05-19

## 2020-05-19 NOTE — OP NOTES
[address] [place] [address]   OPERATIVE REPORT    Name:  Alyce Sampson  MR#:   [id number]  :  1955  ACCOUNT #:  [de-identified]  DATE OF SERVICE:  05/15/2020    PREOPERATIVE DIAGNOSIS:  Cuff tear arthropathy, right shoulder. POSTOPERATIVE DIAGNOSIS:  Cuff tear arthropathy, right shoulder. PROCEDURE PERFORMED:  Reverse total shoulder arthroplasty, right shoulder. SURGEON:  Alejandra Nam MD    ASSISTANT:  Garret Helm PA-C. Garret Helm was instrumental in assisting through all facets of the procedure. ANESTHESIA:  General.    COMPLICATIONS:  None. SPECIMENS REMOVED:  Humeral head. IMPLANTS:  Per brief OP note. ESTIMATED BLOOD LOSS:  50 mL. FINDINGS:  As above. BRIEF HISTORY:  The patient has had significant amount of problems with the right shoulder not responding to conservative treatment, was consented for surgery after having discussed at length possible risks and complications of surgery including infection, bleeding, recurrence of pain among other possible problems. PROCEDURE IN DETAIL:  The patient was taken to the operating room, placed under general endotracheal anesthesia by the Anesthesia staff, placed in a beach-chair position, and the right arm prepped with ChloraPrep solution and draped as a free sterile field. A deltopectoral approach was used. Subcutaneous tissues were dissected sharply to the level of the interval, which was developed protecting the cephalic vein. Deltoid retracted laterally and pectoralis medially. Clavipectoral fascia was then incised and debrided, and the conjoint tendon retracted medially. The superior third of the pectoralis tendon was divided, and the previous biceps tenodesis had been done with all sutures visible. The subscapularis tenotomy was performed exposing the joint. Joint was dislocated. Massive rotator cuff tear was present and significant degenerative changes in the glenohumeral joint.   The canal was then accessed through the top, and 5 and 6 reamers were placed, and then the cutting guide was placed cutting the head at the surgical neck. The glenoid was then exposed. Capsule anteriorly was then removed. Darrach retractor was placed protecting the axillary nerves, and the musculocutaneous nerve had been protected at all times. Once the capsule was circumferentially released, the 24 baseplate guide pin was then placed, and the glenosphere measured for 33. Baseplate guide pin concentric reaming was then used followed by a central hole. The central screw had been measured 25 for which a 25 tap was placed, and then the base plate with a 03-FH central screw was screwed into the glenoid. Four peripheral screws were used, two locking and two non-locking screws. A 33 plus 4 lateralized [employer] modular component was then placed and secured. Attention was then placed in the proximal humerus where the canal was reamed to an 8, then broaches were placed from 5 to a 7, which was thought to be appropriate size. A suture cuff 36 plus 2 right was thought to be appropriate size. Trial was then impacted at 20 degrees of retroversion, and it was tried with a plus 6 humeral insert, which was thought to be excellent fit. All the trials were removed. The 7 stem with a 36 plus 3 suture cuff were inserted with six FiberWire sutures in the collar, two of the sutures accessed through the bicipital groove. The plus 6 insert was impacted, and the component reduced with excellent range of motion and stability. The sutures were then tied to each other in a SutureBridge technique securing the subscapularis tendon. Exparel was injected after irrigation. Deltopectoral interval was closed with 0 Vicryl, subcutaneous tissues with 2-0 Vicryl, and the skin with 4-0 Monocryl. Sterile dressings were applied. The patient tolerated the procedure well and was taken to recovery room without problems.         Nathalia Marroquin MD [id number]  D:  05/18/2020 14:47  T:  05/19/2020 0:28  JOB #:  [id number]

## 2020-05-20 ENCOUNTER — APPOINTMENT (OUTPATIENT)
Dept: PHYSICAL THERAPY | Age: 65
End: 2020-05-20
Payer: MEDICARE

## 2020-05-20 ENCOUNTER — HOSPITAL ENCOUNTER (OUTPATIENT)
Dept: PHYSICAL THERAPY | Age: 65
Discharge: HOME | End: 2020-05-20
Payer: MEDICARE

## 2020-05-20 PROCEDURE — 97110 THERAPEUTIC EXERCISES: CPT

## 2020-05-20 PROCEDURE — 97161 PT EVAL LOW COMPLEX 20 MIN: CPT

## 2020-05-20 NOTE — PROGRESS NOTES
[facility]  [address] [facility]  [phone] fax    Plan of Care/ Statement of Necessity for Physical Therapy Services    Patient name: Isaias Lopez Start of Care: 2020   Referral source: [employer],* : 1955    Medical Diagnosis: Pain in right shoulder [M25.511]  Complete rotator cuff tear or rupture of right shoulder, not specified as traumatic [M75.121]  Payor: [place] MEDICARE / Plan: [place] MEDICARE PART A & B / Product Type: Medicare /  Onset Date:05/15/2020    Treatment Diagnosis: Right  Shoulder pain   Prior Hospitalization: see medical history Provider#: [id number]   Medications: Verified on Patient summary List    Comorbidities: OA, Hepatitis A ()   Prior Level of Function: I with all ADLs and household tasks, driving      The Plan of Care and following information is based on the information from the initial evaluation. Assessment/ key information: The patient is a 78 y/o female s/p right Reverse TSA on 05/15/2020. Prior to surgery, the patient reports right shoulder pain since 2019, with cortisone injection in 2020 providing limited pain relief. Upon evaluation, the patient presents to PT in a sling, with reports of pain at 5/10 and nausea. The patient reports pain with positioning and is very guarded due to fear of pain. Upon evaluation the patient demonstrates decreased PROM, no AROM, impaired strength, and decreased position tolerance. Pt will benefit from PT to progress patient per protocol to improve PROM to AROM and improve function to improve QOL and independence with ADLs.     Evaluation Complexity History MEDIUM  Complexity : 1-2 comorbidities / personal factors will impact the outcome/ POC ; Examination LOW Complexity : 1-2 Standardized tests and measures addressing body structure, function, activity limitation and / or participation in recreation  ;Presentation LOW Complexity : Stable, uncomplicated  ;Clinical Decision Making HIGH Complexity : FOTO score of 1- 25   Overall Complexity Rating: LOW   Problem List: pain affecting function, decrease ROM, decrease strength, edema affecting function, decrease ADL/ functional abilitiies, decrease activity tolerance, decrease flexibility/ joint mobility and decrease transfer abilities   Treatment Plan may include any combination of the following: Therapeutic exercise, Therapeutic activities, Neuromuscular re-education, Physical agent/modality, Manual therapy, Patient education, Self Care training, Functional mobility training and Home safety training  Patient / Family readiness to learn indicated by: asking questions, trying to perform skills and interest  Persons(s) to be included in education: patient (P)  Barriers to Learning/Limitations: None  Patient Goal (s): to get back to using my right arm  Patient Self Reported Health Status: good  Rehabilitation Potential: good    Short Term Goals: To be accomplished in 2 weeks:  STG 1: Patient to be I and compliant with HEP to increase PROM of right shoulder and maintain AROM of right elbow and wrist to progress per protocol and improve I with ADL. STG 2: Patient to report 3/10 pain or less for positioning for sleeping. Long Term Goals: To be accomplished in 4 weeks:  LTG 1: Patient to improve FOTO score by 44 points to indicate improved function. LTG 2: Patient to demonstrate full PROM per protocol for ADLs. Frequency / Duration: Patient to be seen 2 times per week for 4 weeks.     Patient/ Caregiver education and instruction: Diagnosis, prognosis, self care, activity modification, brace/ splint application and exercises   [x]  Plan of care has been reviewed with PTA    Certification Period: 05/20/2020 - 06/18/2020  Kate Em, PT 5/20/2020 12:39 PM    ________________________________________________________________________    I certify that the above Therapy Services are being furnished while the patient is under my care. I agree with the treatment plan and certify that this therapy is necessary.     Physician's Signature:____________Date:_________TIME:________    ** Signature, Date and Time must be completed for valid certification **    Please sign and return to [facility] [place]  [address] [place] [address]  [phone] fax

## 2020-05-20 NOTE — PROGRESS NOTES
PT DAILY TREATMENT NOTE/SHOULDER EVAL 10-18    Patient Name: Linsey Ruiz  Date:2020  : 1955  [x]  Patient  Verified  Payor: VA MEDICARE / Plan: VA MEDICARE PART A & B / Product Type: Medicare /    In time:1108  Out time:1152  Total Treatment Time (min): 44  Visit #: 1 of 8    Medicare/BCBS Only   Total Timed Codes (min):  18 1:1 Treatment Time:  44       Treatment Area: Pain in right shoulder [M25.511]  Complete rotator cuff tear or rupture of right shoulder, not specified as traumatic [M75.121]    SUBJECTIVE  Pain Level (0-10 scale): 510  []constant []intermittent []improving []worsening []no change since onset    Any medication changes, allergies to medications, adverse drug reactions, diagnosis change, or new procedure performed?: [x] No    [] Yes (see summary sheet for update)  Subjective functional status/changes:       Pt reports taking Roxycotin and wanting to wean herself because it makes her \"feel weird\"    Pt is post op right total shoulder reversal 5/15/20. Pt reports having surgery - bicep tendon repair; She does not know what caused her shoulder pain this time. Pt reports shoulder shoulder pain since 2019. No falls or specific injury. No PT done; cortisone shot given in  with relief for only one week. She was supposed to have surgery in March and then it was delayed to May due to COVID-19. Pt currently feels nausea during eval.     Pt comes to PT with daughter. Lives with - he does not drive; has support of two daughter; 1 level home- 2 steps to enter porch and 1 to enter home; does have side ramp also- currently using.      I with all ADLs prior to December; driving; had pain in December limiting AROM before surgery      Right hand dominant    Mostly challenged with reaching behind the 's seat in car; difficulty with lifting heavy items;     Currently and prior to surgery having trouble sleeping at night; Typically lays on right side; reports stiffness when 1st waking in morning; currently sleeping propped by pilllows in a reclined position. Using ice and does get relief with that currently. Review of limitation of AROM of right shoulder after TSR; Flex 75 degrees  ABD 60 degreres      PLOF: I with ADLs, transfers and all tasks, driving  Limitations to PLOF: none  Mechanism of Injury: none known  Current symptoms/Complaints: see above  Previous Treatment/Compliance: cortisone injection Jan 2020  PMHx/Surgical Hx: biceps tendon repair 2002  Work Hx: not currently working  Living Situation: lives with  in one level home with 2 steps to enter  Pt Goals: to get back to using my arm  Barriers: []pain []financial []time []transportation []other  Motivation: good  Substance use: []Alcohol []Tobacco []other:   FABQ Score: []low []elevate  Cognition: A & O x 3    Other:    OBJECTIVE/EXAMINATION  Domestic Life: lives with  and is caretaker  Activity/Recreational Limitations: I with all ADLS and activities prior to surgery; driving  Mobility: I  Self Care:  I      Modality rationale:    Min Type Additional Details    [] Estim:  []Unatt       []IFC  []Premod                        []Other:  []w/ice   []w/heat  Position:  Location:    [] Estim: []Att    []TENS instruct  []NMES                    []Other:  []w/US   []w/ice   []w/heat  Position:  Location:    []  Traction: [] Cervical       []Lumbar                       [] Prone          []Supine                       []Intermittent   []Continuous Lbs:  [] before manual  [] after manual    []  Ultrasound: []Continuous   [] Pulsed                           []1MHz   []3MHz Location:  W/cm2:    []  Iontophoresis with dexamethasone         Location: [] Take home patch   [] In clinic    []  Ice     []  heat  []  Ice massage  []  Laser   []  Anodyne Position:  Location:    []  Laser with stim  []  Other: Position:  Location:    []  Vasopneumatic Device Pressure:       [] lo [] med [] hi Temperature: [] lo [] med [] hi   [] Skin assessment post-treatment:  []intact []redness- no adverse reaction    []redness - adverse reaction:     26 min []Eval                  []Re-Eval       18 min Therapeutic Exercise:  [x] See flow sheet : gently oscillations to right UE, PROM   Rationale: increase ROM to improve the patients ability to improve PROM of right shoulder per protocol and maintain right wrist and elbow AROM for ADLs. With   [x] TE   [] TA   [] neuro   [] other: Patient Education: [x] Review HEP    [] Progressed/Changed HEP based on:   [] positioning   [] body mechanics   [] transfers   [] heat/ice application    [] other: Review and issue of HEP; review of use of ice for reduced pain and inflammation; review of protocol and adhering to no AROM and use of sling; educated pt on positioning and sling wear     Other Objective/Functional Measures: See POC    Physical Therapy Evaluation - Shoulder    Posture: [] Poor    [x] Fair    [] Good    Describe:    ROM: See above  [x] Unable to assess passive  ER/IR at this time; patient very guarded and decreased ability to relax at eval due to pain                                         Strength:   [x] Unable to assess at this time                                                                               Scapulohumoral Control / Rhythm:  Able to eccentrically lower with good control?  Left: [] Yes   [] No     Right: [] Yes   [] No    Accessory Motions:    Palpation  [] Min  [x] Mod  [] Severe    Location: palpable pain/tightness at right UT and tenderness at anterior shoulder  [] Min  [] Mod  [] Severe    Location:  [] Min  [] Mod  [] Severe    Location:        Other Tests / Comments:        Pain Level (0-10 scale) post treatment: 5/10    ASSESSMENT/Changes in Function: See POC    Patient will continue to benefit from skilled PT services to modify and progress therapeutic interventions, address functional mobility deficits, address ROM deficits, address strength deficits, analyze and address soft tissue restrictions, analyze and cue movement patterns, analyze and modify body mechanics/ergonomics, assess and modify postural abnormalities and instruct in home and community integration to attain remaining goals.      []  See Plan of Care  []  See progress note/recertification  []  See Discharge Summary         Progress towards goals / Updated goals:  See POC    PLAN  []  Upgrade activities as tolerated     [x]  Continue plan of care  []  Update interventions per flow sheet       []  Discharge due to:_  []  Other:_      Patricio Fowler, PT 5/20/2020  11:09 AM

## 2020-05-21 ENCOUNTER — OFFICE VISIT (OUTPATIENT)
Dept: ORTHOPEDIC SURGERY | Age: 65
End: 2020-05-21

## 2020-05-21 VITALS — TEMPERATURE: 96.7 F

## 2020-05-21 DIAGNOSIS — M75.101 ROTATOR CUFF TEAR ARTHROPATHY OF RIGHT SHOULDER: Primary | ICD-10-CM

## 2020-05-21 DIAGNOSIS — M12.811 ROTATOR CUFF TEAR ARTHROPATHY OF RIGHT SHOULDER: Primary | ICD-10-CM

## 2020-05-21 RX ORDER — ONDANSETRON 4 MG/1
4 TABLET, FILM COATED ORAL
Qty: 30 TAB | Refills: 1 | Status: SHIPPED | OUTPATIENT
Start: 2020-05-21

## 2020-05-21 RX ORDER — FLUOCINONIDE 0.5 MG/G
CREAM TOPICAL 2 TIMES DAILY
Qty: 15 G | Refills: 0 | Status: SHIPPED | OUTPATIENT
Start: 2020-05-21

## 2020-05-21 NOTE — PROGRESS NOTES
Linsey Ruiz  1955     HISTORY OF PRESENT ILLNESS  Linsey Ruiz is a 77 y.o. female who presents today for evaluation s/p Right reverse total shoulder arthroplasty on 5/15/2020. Patient has been going to PT. Describes pain as a 5/10. Has been taking minimal meds for pain. Has some nausea. Still has night pain. Also c/o pruritic red rash on sacral area. Patient denies any fever, chills, chest pain, shortness of breath or calf pain. The remainder of the review of systems is negative. There are no new illness or injuries to report since last seen in the office. No changes in medications, allergies, social or family history. PHYSICAL EXAM:   Visit Vitals  Temp (!) 96.7 °F (35.9 °C)      The patient is a well-developed, well-nourished female in no acute distress. The patient is alert and oriented times three. The patient appears to be well groomed. Mood and affect are normal.  ORTHOPEDIC EXAM of right shoulder:  Inspection: swelling present,  Bruising present  Incision, clean, dry, intact, sutures in place  Passive glenohumeral abduction 0-40 degrees  Stability: Stable  Strength: n/a  2+ distal pulses    IMAGING: 3 view xray images of Right shoulder on 5/21/2020 read and reviewed by myself reveal revere total shoulder arthroplasty in excellent position. IMPRESSION:  S/P Right reverse total shoulder arthroplasty    PLAN:   Incisions cleaned. Surgery was discussed at length today. Patient to continue with PROM and PT. Continue wearing sling. Stressed to patient that nothing causes an increase in pain.     Patient seen and evaluated by Dr. Desire Meraz today who agrees with treatment plan     RTC 3 weeks    [facility]

## 2020-05-21 NOTE — PATIENT INSTRUCTIONS
You may now shower and get your incisions wet. We recommend starting scar massage in 1 week to your incision(s). Take Vitamin E, Cocoa Butter or Scar Cream and massage the incision 2 times a day. This will help soften your incisions and help de-sensitize the skin as the nerves \"wake up\". 1. Take zofran for nausea as needed 2. Lidex cream from pharmacy to back. Call me on Tuesday to update me on rash. 3. Sling for comfort. Remember no use of your arm.

## 2020-05-27 ENCOUNTER — HOSPITAL ENCOUNTER (OUTPATIENT)
Dept: PHYSICAL THERAPY | Age: 65
Discharge: HOME | End: 2020-05-27
Payer: MEDICARE

## 2020-05-27 PROCEDURE — 97110 THERAPEUTIC EXERCISES: CPT

## 2020-05-27 NOTE — PROGRESS NOTES
PT DAILY TREATMENT NOTE 10-18    Patient Name: Augustin Poe  Date:2020  : 1955  [x]  Patient  Verified  Payor: VA MEDICARE / Plan: VA MEDICARE PART A & B / Product Type: Medicare /    In time:332  Out time:423  Total Treatment Time (min): 51  Visit #: 2 of 8    Medicare/BCBS Only   Total Timed Codes (min):  41 1:1 Treatment Time:  41       Treatment Area: Pain in right shoulder [M25.511]  Complete rotator cuff tear or rupture of right shoulder, not specified as traumatic [M75.121]    SUBJECTIVE  Pain Level (0-10 scale): 0  Any medication changes, allergies to medications, adverse drug reactions, diagnosis change, or new procedure performed?: [x] No    [] Yes (see summary sheet for update)  Subjective functional status/changes:   [] No changes reported  Pt reports having difficulty with pendulum exercises.  Reports significant bruising and overall having pain when she has to move arm     OBJECTIVE    Modality rationale: decrease inflammation and decrease pain to decrease post exercise soreness   Min Type Additional Details    [] Estim:  []Unatt       []IFC  []Premod                        []Other:  []w/ice   []w/heat  Position:  Location:    [] Estim: []Att    []TENS instruct  []NMES                    []Other:  []w/US   []w/ice   []w/heat  Position:  Location:    []  Traction: [] Cervical       []Lumbar                       [] Prone          []Supine                       []Intermittent   []Continuous Lbs:  [] before manual  [] after manual    []  Ultrasound: []Continuous   [] Pulsed                           []1MHz   []3MHz W/cm2:  Location:    []  Iontophoresis with dexamethasone         Location: [] Take home patch   [] In clinic    []  Ice     []  heat  []  Ice massage  []  Laser   []  Anodyne Position:  Location:    []  Laser with stim  []  Other:  Position:  Location:   10 [x]  Vasopneumatic Device Pressure:       [x] lo [] med [] hi   Temperature: [x] lo [] med [] hi   [] Skin assessment post-treatment:  [x]intact []redness- no adverse reaction    []redness - adverse reaction:     41 min Therapeutic Exercise:  [x] See flow sheet : including PROM into protected ER, abd, flexion   Rationale: increase ROM to improve the patients ability to advance to next phse of protocol          With   [] TE   [] TA   [] neuro   [] other: Patient Education: [x] Review HEP    [] Progressed/Changed HEP based on:   [] positioning   [] body mechanics   [] transfers   [] heat/ice application    [] other:      Other Objective/Functional Measures: PROM right shoulder: ER = lacking 25degrees, abduction = 45degrees, flexion = 45degress     Pain Level (0-10 scale) post treatment: 0    ASSESSMENT/Changes in Function: Pt was able to initiate exercises per flow sheet with minimal increase in symptoms. Pt requiring frequent cuing throughout PROM and pendulum exercises for relaxation to prevent guarding. Pt apprehensive with PROM; limited into ER due to reports of \"pulling\" and \"pain\"    Patient will continue to benefit from skilled PT services to modify and progress therapeutic interventions, address functional mobility deficits, address ROM deficits, address strength deficits, analyze and address soft tissue restrictions, analyze and cue movement patterns, analyze and modify body mechanics/ergonomics, assess and modify postural abnormalities, address imbalance/dizziness and instruct in home and community integration to attain remaining goals. []  See Plan of Care  []  See progress note/recertification  []  See Discharge Summary         Progress towards goals / Updated goals:  Short Term Goals: To be accomplished in 2 weeks:  STG 1: Patient to be I and compliant with HEP to increase PROM of right shoulder and maintain AROM of right elbow and wrist to progress per protocol and improve I with ADL.     Reports compliance but difficulty with pendulum exercise  STG 2: Patient to report 3/10 pain or less for positioning for sleeping. Appears to be declining, 0/10 at rest     Long Term Goals: To be accomplished in 4 weeks:  LTG 1: Patient to improve FOTO score by 44 points to indicate improved function. To be reassessed at recertification  LTG 2: Patient to demonstrate full PROM per protocol for ADLs.     Initiated today    PLAN  [x]  Upgrade activities as tolerated     [x]  Continue plan of care  []  Update interventions per flow sheet       []  Discharge due to:_  []  Other:_      Gwyn Martinez PT 5/27/2020  3:38 PM    Future Appointments   Date Time Provider Jenny Bro   5/29/2020  8:45 AM KIRBY Hernandez [facility]   6/11/2020  9:15 AM Nikky Dempsey PA [address]

## 2020-05-29 ENCOUNTER — HOSPITAL ENCOUNTER (OUTPATIENT)
Dept: PHYSICAL THERAPY | Age: 65
Discharge: HOME | End: 2020-05-29
Payer: MEDICARE

## 2020-05-29 PROCEDURE — 97110 THERAPEUTIC EXERCISES: CPT

## 2020-05-29 PROCEDURE — 97016 VASOPNEUMATIC DEVICE THERAPY: CPT

## 2020-05-29 NOTE — PROGRESS NOTES
PT DAILY TREATMENT NOTE 10-18    Patient Name: Yaneth Hernández  Date:2020  : 1955  [x]  Patient  Verified  Payor: VA MEDICARE / Plan: VA MEDICARE PART A & B / Product Type: Medicare /    In time:845  Out time:951  Total Treatment Time (min): 60  Visit #: 3 of 8    Medicare/BCBS Only   Total Timed Codes (min):   1:1 Treatment Time:         Treatment Area: Pain in right shoulder [M25.511]  Complete rotator cuff tear or rupture of right shoulder, not specified as traumatic [M75.121]    SUBJECTIVE  Pain Level (0-10 scale): 2-3/10  Any medication changes, allergies to medications, adverse drug reactions, diagnosis change, or new procedure performed?: [x] No    [] Yes (see summary sheet for update)  Subjective functional status/changes:   [] No changes reported  Pt reports feeling sore today. OBJECTIVE    Modality rationale: decrease inflammation and decrease pain to improve the patients ability to improve activity tolerance and reduce post exercise soreness.     Min Type Additional Details    [] Estim:  []Unatt       []IFC  []Premod                        []Other:  []w/ice   []w/heat  Position:  Location:    [] Estim: []Att    []TENS instruct  []NMES                    []Other:  []w/US   []w/ice   []w/heat  Position:  Location:    []  Traction: [] Cervical       []Lumbar                       [] Prone          []Supine                       []Intermittent   []Continuous Lbs:  [] before manual  [] after manual    []  Ultrasound: []Continuous   [] Pulsed                           []1MHz   []3MHz W/cm2:  Location:    []  Iontophoresis with dexamethasone         Location: [] Take home patch   [] In clinic    []  Ice     []  heat  []  Ice massage  []  Laser   []  Anodyne Position:  Location:    []  Laser with stim  []  Other:  Position:  Location:   15 []  Vasopneumatic Device Pressure:       [x] lo [] med [] hi   Temperature: [x] lo [] med [] hi   [] Skin assessment post-treatment:  []intact []redness- no adverse reaction    []redness - adverse reaction:     45 min Therapeutic Exercise:  [x] See flow sheet : PROM with gentle oscillations to left shoulder in flexion and abduction   Rationale: increase ROM to improve the patients ability to maintain PROM to progress per protocol. With   [] TE   [] TA   [] neuro   [] other: Patient Education: [x] Review HEP    [] Progressed/Changed HEP based on:   [x] positioning   [x] body mechanics   [] transfers   [] heat/ice application    [] other: Patient encouraged to come out of sling when doing HEP 2-3 times per day fore reduced edema; pt also encouraged to ice shoulder for 10 minutes at at time- suggested after exercises; educated pt on importance of HEP and movement of allowed joints to reduce swelling. Other Objective/Functional Measures: Added scapular retractions seated with no shoulder extension  Encouraged pt to engage muscles vs. Moving shoulder back. Cues needed for pendulums and to relax shoulder for increased swing     Pain Level (0-10 scale) post treatment: 2-3/10    ASSESSMENT/Changes in Function: Mrs. Cora Evans demonstrates increased fear avoidance and reluctant to move arm due to not wanting to go against protocol and fear of pain. Reviewed importance of compliance with HEP to assist with reduced swelling at elbow. Also encouraged pt to continue wearing sling. Patient will continue to benefit from skilled PT services to modify and progress therapeutic interventions, address functional mobility deficits, address ROM deficits, address strength deficits, analyze and address soft tissue restrictions, analyze and cue movement patterns, analyze and modify body mechanics/ergonomics, assess and modify postural abnormalities and instruct in home and community integration to attain remaining goals.      []  See Plan of Care  []  See progress note/recertification  []  See Discharge Summary         Progress towards goals / Updated goals:  STG 1: Patient to be I and compliant with HEP to increase PROM of right shoulder and maintain AROM of right elbow and wrist to progress per protocol and improve I with ADL. Reports compliance but difficulty with pendulum exercise  STG 2: Patient to report 3/10 pain or less for positioning for sleeping.               Appears to be declining, 0/10 at rest  [address] be accomplished in 4 weeks:  LTG 1: Patient to improve FOTO score by 44 points to indicate improved function. To be reassessed at recertification  LTG 2: Patient to demonstrate full PROM per protocol for ADLs.                Initiated today    PLAN  []  Upgrade activities as tolerated     []  Continue plan of care  []  Update interventions per flow sheet       []  Discharge due to:_  []  Other:_      Patricio Fowler PT 5/29/2020  9:01 AM    Future Appointments   Date Time Provider Jenny Bro   6/1/2020  2:45 PM Della Buckner, PT [id number]   6/3/2020  3:30 PM Della Buckner, PT [id number]   6/8/2020  3:30 PM Della Buckner, PT [facility]   6/10/2020  3:30 PM Nichelle Gray, PT [id number]   6/11/2020  9:15 AM Colby Dempsey PA [address]

## 2020-06-01 ENCOUNTER — HOSPITAL ENCOUNTER (OUTPATIENT)
Dept: PHYSICAL THERAPY | Age: 65
Discharge: HOME | End: 2020-06-01
Payer: MEDICARE

## 2020-06-01 PROCEDURE — 97110 THERAPEUTIC EXERCISES: CPT

## 2020-06-01 PROCEDURE — 97016 VASOPNEUMATIC DEVICE THERAPY: CPT

## 2020-06-01 NOTE — PROGRESS NOTES
PT DAILY TREATMENT NOTE 10-18    Patient Name: Adela Palacios  Date:2020  : 1955  [x]  Patient  Verified  Payor: [place] MEDICARE / Plan: [place] MEDICARE PART A & B / Product Type: Medicare /    In time:250  Out time:343  Total Treatment Time (min): 48  Visit #: 4 of 8    Medicare/BCBS Only   Total Timed Codes (min):  43 1:1 Treatment Time:  43       Treatment Area: Pain in right shoulder [M25.511]  Complete rotator cuff tear or rupture of right shoulder, not specified as traumatic [M75.121]    SUBJECTIVE  Pain Level (0-10 scale): 2-3/10  Any medication changes, allergies to medications, adverse drug reactions, diagnosis change, or new procedure performed?: [x] No    [] Yes (see summary sheet for update)  Subjective functional status/changes:   [] No changes reported  Pt reports having a busy weekend and feeling a little sore today.      OBJECTIVE    Modality rationale: decrease inflammation and decrease pain to improve the patients ability to reduce post exercise soreness and improve tolerance to ADLs per protocol   Min Type Additional Details    [] Estim:  []Unatt       []IFC  []Premod                        []Other:  []w/ice   []w/heat  Position:  Location:    [] Estim: []Att    []TENS instruct  []NMES                    []Other:  []w/US   []w/ice   []w/heat  Position:  Location:    []  Traction: [] Cervical       []Lumbar                       [] Prone          []Supine                       []Intermittent   []Continuous Lbs:  [] before manual  [] after manual    []  Ultrasound: []Continuous   [] Pulsed                           []1MHz   []3MHz W/cm2:  Location:    []  Iontophoresis with dexamethasone         Location: [] Take home patch   [] In clinic    []  Ice     []  heat  []  Ice massage  []  Laser   []  Anodyne Position:  Location:    []  Laser with stim  []  Other:  Position:  Location:   15 []  Vasopneumatic Device Pressure:       [x] lo [] med [] hi   Temperature: [x] lo [] med [] hi   [] Skin assessment post-treatment:  []intact []redness- no adverse reaction    []redness  adverse reaction:         43 min Therapeutic Exercise:  [] See flow sheet : PROM to left shoulder with gentle oscillatoin   Rationale: increase ROM to improve the patients ability to increase ease with ADLs per protocl              With   [] TE   [] TA   [] neuro   [] other: Patient Education: [x] Review HEP    [] Progressed/Changed HEP based on:   [] positioning   [] body mechanics   [] transfers   [] heat/ice application    [] other:      Other Objective/Functional Measures: increased passive flexion and abduction today  Pt less guarded which allowed for increased range     Pain Level (0-10 scale) post treatment: 0/10    ASSESSMENT/Changes in Function: Pt progressing per protocol. PROM continues to progress. Pt reports feeling more comfortable with HEP as well as using her right wrist and gripping items with right hand. Patient will continue to benefit from skilled PT services to modify and progress therapeutic interventions, address functional mobility deficits, address ROM deficits, address strength deficits, analyze and address soft tissue restrictions, analyze and cue movement patterns, analyze and modify body mechanics/ergonomics, assess and modify postural abnormalities and instruct in home and community integration to attain remaining goals. []  See Plan of Care  []  See progress note/recertification  []  See Discharge Summary         Progress towards goals / Updated goals:  STG 1: Patient to be I and compliant with HEP to increase PROM of right shoulder and maintain AROM of right elbow and wrist to progress per protocol and improve I with ADL.                Reports compliance but difficulty with pendulum exercise  STG 2: Patient to report 3/10 pain or less for positioning for sleeping.              Progressing. Pt report 2-3/10 at rest today.  6/1/2020     Long Term Goals: To be accomplished in 4 weeks:  LTG 1: Patient to improve FOTO score by 44 points to indicate improved function.               YE be reassessed at recertification  LTG 2: Patient to demonstrate full PROM per protocol for ADLs.                Initiated today    PLAN  []  Upgrade activities as tolerated     [x]  Continue plan of care  []  Update interventions per flow sheet       []  Discharge due to:_  []  Other:_      India Carver, PT 6/1/2020  3:30 PM    Future Appointments   Date Time Provider Jenny Bro   6/3/2020  3:30 PM Jena Arechiga, PT [id number] [facility]   6/8/2020  3:30 PM Juicemercedes Chun, PT [id number] [facility]   6/10/2020  3:30 PM Juice Chun, PT [id number] [facility]   6/11/2020  9:15 AM Erik Dempsey PA [address]

## 2020-06-03 ENCOUNTER — HOSPITAL ENCOUNTER (OUTPATIENT)
Dept: PHYSICAL THERAPY | Age: 65
Discharge: HOME | End: 2020-06-03
Payer: MEDICARE

## 2020-06-03 PROCEDURE — 97016 VASOPNEUMATIC DEVICE THERAPY: CPT

## 2020-06-03 PROCEDURE — 97110 THERAPEUTIC EXERCISES: CPT

## 2020-06-03 NOTE — PROGRESS NOTES
PT DAILY TREATMENT NOTE 10-18    Patient Name: Lolita Choi  Date:6/3/2020  : 1955  [x]  Patient  Verified  Payor: [place] MEDICARE / Plan: [place] MEDICARE PART A & B / Product Type: Medicare /    In time:332  Out time:435  Total Treatment Time (min): 55  Visit #: 5 of 8    Medicare/BCBS Only   Total Timed Codes (min):  45 1:1 Treatment Time:  45       Treatment Area: Pain in right shoulder [M25.511]  Complete rotator cuff tear or rupture of right shoulder, not specified as traumatic [M75.121]    SUBJECTIVE  Pain Level (0-10 scale): 3/10  Any medication changes, allergies to medications, adverse drug reactions, diagnosis change, or new procedure performed?: [x] No    [] Yes (see summary sheet for update)  Subjective functional status/changes:   [] No changes reported  Pt reports increased soreness and pain near her incision. She put her sling on and dressed herself without her 's help today and thinks that may have contributed to her pain. Towards end of session patient reports doing laundry earlier today as well as changing the sheets on her bed- and having a busy morning. OBJECTIVE    Modality rationale: decrease inflammation and decrease pain to improve the patients ability to reduce post exercise soreness and increase ease with ADLs.    Min Type Additional Details    [] Estim:  []Unatt       []IFC  []Premod                        []Other:  []w/ice   []w/heat  Position:  Location:    [] Estim: []Att    []TENS instruct  []NMES                    []Other:  []w/US   []w/ice   []w/heat  Position:  Location:    []  Traction: [] Cervical       []Lumbar                       [] Prone          []Supine                       []Intermittent   []Continuous Lbs:  [] before manual  [] after manual    []  Ultrasound: []Continuous   [] Pulsed                           []1MHz   []3MHz W/cm2:  Location:    []  Iontophoresis with dexamethasone         Location: [] Take home patch   [] In clinic    []  Ice     [] heat  []  Ice massage  []  Laser   []  Anodyne Position:  Location:    []  Laser with stim  []  Other:  Position:  Location:   15 []  Vasopneumatic Device Pressure:       [x] lo [] med [] hi   Temperature: [x] lo [] med [] hi   [] Skin assessment post-treatment:  []intact []redness- no adverse reaction    []redness  adverse reaction:       40 min Therapeutic Exercise:  [] See flow sheet : PROM with gentle oscillations   Rationale: increase ROM and increase strength to improve the patients ability to progress per protocol for I with ADLs              With   [] TE   [] TA   [] neuro   [] other: Patient Education: [x] Review HEP    [] Progressed/Changed HEP based on:   [] positioning   [] body mechanics   [] transfers   [] heat/ice application    [] other:      Other Objective/Functional Measures: Pt increase in soreness today may be related to house activities this morning. encouraged pt to continue to limit activity that requires a lot of motion- although she is only using her left arm, she still could be engaging muscles on the right    Pain Level (0-10 scale) post treatment: 0/10    ASSESSMENT/Changes in Function: Pt progressing per protocol. PROM continues to improve. Will measure PROM NV. Encouraged    Patient will continue to benefit from skilled PT services to modify and progress therapeutic interventions, address functional mobility deficits, address ROM deficits, address strength deficits, analyze and address soft tissue restrictions, analyze and cue movement patterns, analyze and modify body mechanics/ergonomics, assess and modify postural abnormalities and instruct in home and community integration to attain remaining goals.      []  See Plan of Care  []  See progress note/recertification  []  See Discharge Summary         Progress towards goals / Updated goals:  STG 1: Patient to be I and compliant with HEP to increase PROM of right shoulder and maintain AROM of right elbow and wrist to progress per protocol and improve I with ADL.              Goal met. 6/3/2020  STG 2: Patient to report 3/10 pain or less for positioning for sleeping.              Progressing. Pt report 2-3/10 at rest today. 6/1/2020     Long Term Goals: To be accomplished in 4 weeks:  LTG 1: Patient to improve FOTO score by 44 points to indicate improved function.               To be reassessed at recertification  LTG 2: Patient to demonstrate full PROM per protocol for ADLs.                Initiated today    PLAN  []  Upgrade activities as tolerated     [x]  Continue plan of care  []  Update interventions per flow sheet       []  Discharge due to:_  []  Other:_      Daniel Cardenas, PT 6/3/2020  3:35 PM    Future Appointments   Date Time Provider Jenny Bro   6/8/2020  3:30 PM Neil Brito, PT [id number] [facility]   6/10/2020  3:30 PM Neil Brito PT [id number] [facility]   6/11/2020  9:15 AM Mary Dempsey PA [address]

## 2020-06-08 ENCOUNTER — HOSPITAL ENCOUNTER (OUTPATIENT)
Dept: PHYSICAL THERAPY | Age: 65
Discharge: HOME | End: 2020-06-08
Payer: MEDICARE

## 2020-06-08 PROCEDURE — 97140 MANUAL THERAPY 1/> REGIONS: CPT

## 2020-06-08 PROCEDURE — 97110 THERAPEUTIC EXERCISES: CPT

## 2020-06-08 NOTE — PROGRESS NOTES
PT DAILY TREATMENT NOTE 10-18    Patient Name: Dia Holman  Date:2020  : 1955  [x]  Patient  Verified  Payor: [place] MEDICARE / Plan: [place] MEDICARE PART A & B / Product Type: Medicare /    In time:330  Out time:421  Total Treatment Time (min): 46  Visit #: 6 of 8    Medicare/BCBS Only   Total Timed Codes (min):  36 1:1 Treatment Time:  36       Treatment Area: Pain in right shoulder [M25.511]  Complete rotator cuff tear or rupture of right shoulder, not specified as traumatic [M75.121]    SUBJECTIVE  Pain Level (0-10 scale): 0  Any medication changes, allergies to medications, adverse drug reactions, diagnosis change, or new procedure performed?: [x] No    [] Yes (see summary sheet for update)  Subjective functional status/changes:   [] No changes reported  Pt reports she was able to wash under her arm pit in the shower.      OBJECTIVE    Modality rationale: decrease inflammation and decrease pain to improve the patients ability to tolerate sustained postures   Min Type Additional Details    [] Estim:  []Unatt       []IFC  []Premod                        []Other:  []w/ice   []w/heat  Position:  Location:    [] Estim: []Att    []TENS instruct  []NMES                    []Other:  []w/US   []w/ice   []w/heat  Position:  Location:    []  Traction: [] Cervical       []Lumbar                       [] Prone          []Supine                       []Intermittent   []Continuous Lbs:  [] before manual  [] after manual    []  Ultrasound: []Continuous   [] Pulsed                           []1MHz   []3MHz W/cm2:  Location:    []  Iontophoresis with dexamethasone         Location: [] Take home patch   [] In clinic    []  Ice     []  heat  []  Ice massage  []  Laser   []  Anodyne Position:  Location:    []  Laser with stim  []  Other:  Position:  Location:   10 [x]  Vasopneumatic Device Pressure:       [x] lo [] med [] hi   Temperature: [x] lo [] med [] hi   [] Skin assessment post-treatment:  [x]intact []redness- no adverse reaction    []redness  adverse reaction:     28 min Therapeutic Exercise:  [x] See flow sheet : including PROM    Rationale: increase ROM to improve the patients ability to facilitate progression to next phase of protocol    8 min Manual Therapy:  PNF resisted sidelying right scapular patterns   Rationale: decrease pain, increase ROM and increase postural awareness to tolerate sustained postures    With   [] TE   [] TA   [] neuro   [] other: Patient Education: [x] Review HEP    [] Progressed/Changed HEP based on:   [] positioning   [] body mechanics   [] transfers   [] heat/ice application    [] other:      Other Objective/Functional Measures: right PROM shoulder flexion = 95degrees, abduction = 83degrees, ER = 5 degrees, IR = to abdomen     Pain Level (0-10 scale) post treatment: 0    ASSESSMENT/Changes in Function: Pt continues to demonstrate sensitivity to light touch at the shoulder and scar; instructed pt to increase desensitization massage and include bilateral for normal input. PROM is progressing, unable to perform ER past 5degrees due to complaints of discomfort when moving past. Requires cuing to reduce guarding and assistance during PROM     Patient will continue to benefit from skilled PT services to modify and progress therapeutic interventions, address functional mobility deficits, address ROM deficits, address strength deficits, analyze and address soft tissue restrictions, analyze and cue movement patterns, analyze and modify body mechanics/ergonomics, assess and modify postural abnormalities, address imbalance/dizziness and instruct in home and community integration to attain remaining goals.      []  See Plan of Care  []  See progress note/recertification  []  See Discharge Summary         Progress towards goals / Updated goals:  STG 1: Patient to be I and compliant with HEP to increase PROM of right shoulder and maintain AROM of right elbow and wrist to progress per protocol and improve I with ADL.              Goal met. 6/3/2020  STG 2: Patient to report 3/10 pain or less for positioning for sleeping.              Progressing. Pt report 0/10 today     Long Term Goals: To be accomplished in 4 weeks:  LTG 1: Patient to improve FOTO score by 44 points to indicate improved function.               To be reassessed at recertification  LTG 2: Patient to demonstrate full PROM per protocol for ADLs.                Progressing: flexion = 95, abduction = 83, ER = 5degrees, IR = to abdomen     PLAN  [x]  Upgrade activities as tolerated     [x]  Continue plan of care  []  Update interventions per flow sheet       []  Discharge due to:_  []  Other:_      Gwyn Martinez, PT 6/8/2020  3:39 PM    Future Appointments   Date Time Provider Jenny Bro   6/10/2020  3:30 PM Janice Gold, PT [facility]CPTP[facility]   6/11/2020  9:15 AM [address], JACQUELINE [address]

## 2020-06-10 ENCOUNTER — HOSPITAL ENCOUNTER (OUTPATIENT)
Dept: PHYSICAL THERAPY | Age: 65
Discharge: HOME | End: 2020-06-10
Payer: MEDICARE

## 2020-06-10 PROCEDURE — 97110 THERAPEUTIC EXERCISES: CPT

## 2020-06-10 PROCEDURE — 97140 MANUAL THERAPY 1/> REGIONS: CPT

## 2020-06-10 NOTE — PROGRESS NOTES
PT DAILY TREATMENT NOTE 10-18    Patient Name: Faby Salazar  Date:6/10/2020  : 1955  [x]  Patient  Verified  Payor: [place] MEDICARE / Plan: VA MEDICARE PART A & B / Product Type: Medicare /    In time:330  Out time:420  Total Treatment Time (min): 50  Visit #: 7 of 8    Medicare/BCBS Only   Total Timed Codes (min):  40 1:1 Treatment Time:  40       Treatment Area: Pain in right shoulder [M25.511]  Complete rotator cuff tear or rupture of right shoulder, not specified as traumatic [M75.121]    SUBJECTIVE  Pain Level (0-10 scale): 1  Any medication changes, allergies to medications, adverse drug reactions, diagnosis change, or new procedure performed?: [x] No    [] Yes (see summary sheet for update)  Subjective functional status/changes:   [] No changes reported  Pt reports she was ok on Tuesday, but woke up this morning and was sore in her [upper trap] and [coracoid process].     OBJECTIVE    Modality rationale: decrease edema, decrease inflammation and decrease pain to decrease post exercise soreness   Min Type Additional Details    [] Estim:  []Unatt       []IFC  []Premod                        []Other:  []w/ice   []w/heat  Position:  Location:    [] Estim: []Att    []TENS instruct  []NMES                    []Other:  []w/US   []w/ice   []w/heat  Position:  Location:    []  Traction: [] Cervical       []Lumbar                       [] Prone          []Supine                       []Intermittent   []Continuous Lbs:  [] before manual  [] after manual    []  Ultrasound: []Continuous   [] Pulsed                           []1MHz   []3MHz W/cm2:  Location:    []  Iontophoresis with dexamethasone         Location: [] Take home patch   [] In clinic    []  Ice     []  heat  []  Ice massage  []  Laser   []  Anodyne Position:  Location:    []  Laser with stim  []  Other:  Position:  Location:   10 [x]  Vasopneumatic Device Pressure:       [x] lo [] med [] hi   Temperature: [x] lo [] med [] hi   [] Skin assessment post-treatment:  [x]intact []redness- no adverse reaction    []redness  adverse reaction:     17 min Therapeutic Exercise:  [x] See flow sheet :   Rationale: increase ROM and increase strength to improve the patients ability to progress to next phase of protocol    23 min Manual Therapy:  Gentle grade 1/2 GH clocks for pain relief and to reduce guarding; STM to right upper trap to decrease pain; gentle grade 1 mobs during PROM     Pt consented to all manual therapy    Rationale: decrease pain, increase ROM and decrease trigger points to sleep through the night          With   [] TE   [] TA   [] neuro   [] other: Patient Education: [x] Review HEP    [] Progressed/Changed HEP based on:   [] positioning   [] body mechanics   [] transfers   [] heat/ice application    [] other:      Other Objective/Functional Measures: trigger points and increased tone in right upper trap and levator scap; TTP right coracoid process     Pain Level (0-10 scale) post treatment: 0    ASSESSMENT/Changes in Function: Educated pt on guarding and supporting UE at home to help decrease discomfort. Mobs used to decrease guarding during PROM, Pt pain levels and guarding prevent advancement within protocol as pt has not reached prescribed ranges. While slow, pt is progressing. Patient will continue to benefit from skilled PT services to modify and progress therapeutic interventions, address functional mobility deficits, address ROM deficits, address strength deficits, analyze and address soft tissue restrictions, analyze and cue movement patterns, analyze and modify body mechanics/ergonomics, assess and modify postural abnormalities, address imbalance/dizziness and instruct in home and community integration to attain remaining goals.      []  See Plan of Care  []  See progress note/recertification  []  See Discharge Summary         Progress towards goals / Updated goals:  STG 1: Patient to be I and compliant with HEP to increase PROM of right shoulder and maintain AROM of right elbow and wrist to progress per protocol and improve I with ADL.              Goal met. 6/3/2020  STG 2: Patient to report 3/10 pain or less for positioning for sleeping.              Progressing. Pt report 0/10 today     Long Term Goals: To be accomplished in 4 weeks:  LTG 1: Patient to improve FOTO score by 44 points to indicate improved function.               To be reassessed at recertification  LTG 2: Patient to demonstrate full PROM per protocol for ADLs.                Progressing: flexion = 95, abduction = 83, ER = 5degrees, IR = to abdomen     PLAN  [x]  Upgrade activities as tolerated     [x]  Continue plan of care  []  Update interventions per flow sheet       []  Discharge due to:_  []  Other:_      Jacquie Nunez PT 6/10/2020  3:50 PM    Future Appointments   Date Time Provider Jenny Bro   6/11/2020  9:15 AM JACQUELINE [address]   6/15/2020  3:30 PM Heaven Abrams, PT MMCPTPB [facility]   6/17/2020  3:30 PM Heaven Abrams, PT MMCPTP[facility]   6/22/2020  3:30 PM Heaven Abrams, PT [facility]CP[facility]   6/24/2020  3:30 PM Heaven Abrams, PT MMCPTPB [facility]

## 2020-06-11 ENCOUNTER — OFFICE VISIT (OUTPATIENT)
Dept: ORTHOPEDIC SURGERY | Age: 65
End: 2020-06-11

## 2020-06-11 ENCOUNTER — TELEPHONE (OUTPATIENT)
Dept: ORTHOPEDIC SURGERY | Age: 65
End: 2020-06-11

## 2020-06-11 VITALS — HEIGHT: 67 IN | TEMPERATURE: 96.9 F | BODY MASS INDEX: 21.25 KG/M2 | WEIGHT: 135.4 LBS

## 2020-06-11 DIAGNOSIS — M12.811 ROTATOR CUFF TEAR ARTHROPATHY OF RIGHT SHOULDER: Primary | ICD-10-CM

## 2020-06-11 DIAGNOSIS — M75.101 ROTATOR CUFF TEAR ARTHROPATHY OF RIGHT SHOULDER: Primary | ICD-10-CM

## 2020-06-11 NOTE — PROGRESS NOTES
Haley Mercer  1955     HISTORY OF PRESENT ILLNESS  Haley Mercer is a 77 y.o. female who presents today for evaluation s/p Right reverse total shoulder arthroplasty on 5/15/2020. Patient has been going to PT. Pain is a 0/10. Gets sore from time to time. Patient denies any fever, chills, chest pain, shortness of breath or calf pain. The remainder of the review of systems is negative. There are no new illness or injuries to report since last seen in the office. No changes in medications, allergies, social or family history. PHYSICAL EXAM:   Visit Vitals  Temp 96.9 °F (36.1 °C) (Oral)   Ht 5' 7\" (1.702 m)   Wt 135 lb 6.4 oz (61.4 kg)   BMI 21.21 kg/m²      The patient is a well-developed, well-nourished female in no acute distress. The patient is alert and oriented times three. The patient appears to be well groomed. Mood and affect are normal.  ORTHOPEDIC EXAM of right shoulder:  Inspection: swelling not present,  Bruising not present  Incision well healed  Passive glenohumeral abduction 0-70 degrees  Stability: Stable  Strength: n/a  2+ distal pulses    IMPRESSION:  S/P Right reverse total shoulder arthroplasty    PLAN:   1. Patient doing well post operatively  2. Will cont with PT. Ok to d/c sling now. Ok to start arom.  Stressed no lifting pushing or pulling    RTC 4 weeks    [address] [facility]

## 2020-06-11 NOTE — TELEPHONE ENCOUNTER
Patient would like to know when she is able to start driving. Please advise. Patient had reverse total shoulder arthroplasty on 5/15/2020.

## 2020-06-12 NOTE — TELEPHONE ENCOUNTER
Patient called advised per previous message ok to begin driving.  No further action required at this time

## 2020-06-12 NOTE — TELEPHONE ENCOUNTER
Left voicemail to return call. If patient calls back please inform her of JACQUELINE Boland message, \"Ok to start driving now. \"

## 2020-06-15 ENCOUNTER — HOSPITAL ENCOUNTER (OUTPATIENT)
Dept: PHYSICAL THERAPY | Age: 65
Discharge: HOME | End: 2020-06-15
Payer: MEDICARE

## 2020-06-15 PROCEDURE — 97140 MANUAL THERAPY 1/> REGIONS: CPT

## 2020-06-15 PROCEDURE — 97110 THERAPEUTIC EXERCISES: CPT

## 2020-06-15 NOTE — PROGRESS NOTES
PT DAILY TREATMENT NOTE 10-18    Patient Name: Pete Gruber  Date:6/15/2020  : 1955  [x]  Patient  Verified  Payor: [place] MEDICARE / Plan: [place] MEDICARE PART A & B / Product Type: Medicare /    In time:327  Out time:429  Total Treatment Time (min): 58  Visit #: 8 of 8    Medicare/BCBS Only   Total Timed Codes (min):  47 1:1 Treatment Time:  52       Treatment Area: Pain in right shoulder [M25.511]  Complete rotator cuff tear or rupture of right shoulder, not specified as traumatic [M75.121]    SUBJECTIVE  Pain Level (0-10 scale): 1  Any medication changes, allergies to medications, adverse drug reactions, diagnosis change, or new procedure performed?: [x] No    [] Yes (see summary sheet for update)  Subjective functional status/changes:   [] No changes reported  Pt DC sling; did really well on Saturday but was very sore last night.      OBJECTIVE    Modality rationale: decrease inflammation and decrease pain to decrease post exercise soreness   Min Type Additional Details    [] Estim:  []Unatt       []IFC  []Premod                        []Other:  []w/ice   []w/heat  Position:  Location:    [] Estim: []Att    []TENS instruct  []NMES                    []Other:  []w/US   []w/ice   []w/heat  Position:  Location:    []  Traction: [] Cervical       []Lumbar                       [] Prone          []Supine                       []Intermittent   []Continuous Lbs:  [] before manual  [] after manual    []  Ultrasound: []Continuous   [] Pulsed                           []1MHz   []3MHz W/cm2:  Location:    []  Iontophoresis with dexamethasone         Location: [] Take home patch   [] In clinic    []  Ice     []  heat  []  Ice massage  []  Laser   []  Anodyne Position:  Location:    []  Laser with stim  []  Other:  Position:  Location:   10 [x]  Vasopneumatic Device Pressure:       [] lo [x] med [] hi   Temperature: [x] lo [] med [] hi   [] Skin assessment post-treatment:  []intact []redness- no adverse reaction []redness  adverse reaction:     37 min Therapeutic Exercise:  [x] See flow sheet :   Rationale: increase ROM and increase strength to improve the patients ability to perform ADLs    10 min Manual Therapy:  Gentle long axis distraction, grade 1-2 mobilizations with PROM     Pt consented to all manual therapy    Rationale: decrease pain, increase ROM, increase tissue extensibility and decrease trigger points to advance to next phase of protocol    With   [] TE   [] TA   [] neuro   [] other: Patient Education: [x] Review HEP    [] Progressed/Changed HEP based on:   [] positioning   [] body mechanics   [] transfers   [] heat/ice application    [] other:      Other Objective/Functional Measures: see recert     Pain Level (0-10 scale) post treatment: 0    ASSESSMENT/Changes in Function: see recert    Patient will continue to benefit from skilled PT services to modify and progress therapeutic interventions, address functional mobility deficits, address ROM deficits, address strength deficits, analyze and address soft tissue restrictions, analyze and cue movement patterns, analyze and modify body mechanics/ergonomics, assess and modify postural abnormalities, address imbalance/dizziness and instruct in home and community integration to attain remaining goals. []  See Plan of Care  [x]  See progress note/recertification  []  See Discharge Summary         Progress towards goals / Updated goals:  1: Patient to improve FOTO score to 63 to indicate improved function.    Recert status: Progressing = 34  2: Patient to demonstrate full PROM per protocol for ADLs.     Recert status: Progressing: flexion = 104, abduction = 76, ER = 10degrees, IR = to abdomen   3. Pt will demonstrate right AAROM flexion to 120degrees in order to advance to next phase of protocol   Recert status: new goal: AAROM table slide = 77 degrees  4.  Pt will demonstrate right AROM flexion to 120degrees in order to improve ease with ADLS   Recert status: new goal: just initiated AAROM due to pain levels    PLAN  [x]  Upgrade activities as tolerated     [x]  Continue plan of care  []  Update interventions per flow sheet       []  Discharge due to:_  []  Other:_      Stanislaw Rincon, PT 6/15/2020  4:30 PM    Future Appointments   Date Time Provider Jenny Bro   6/17/2020  3:30 PM Ross Peoples, KIRBY [facility]   6/22/2020  3:30 PM Ross Peoples, PT [facility]   6/24/2020  3:30 PM Ross Peoples, PT [facility]   7/9/2020  9:00 AM [address], Shelby Ruiz, JACQUELINE Salter 67

## 2020-06-15 NOTE — PROGRESS NOTES
[facility]  [facility]  [address] [place] [address]  [phone] fax    Continued Plan of Care/ Re-certification for Physical Therapy Services    Patient name: Isaias Lopez Start of Care: 2020   Referral source: [employer],* : 1955               Medical Diagnosis: Pain in right shoulder [M25.511]  Complete rotator cuff tear or rupture of right shoulder, not specified as traumatic [M75.121]  Payor: VA MEDICARE / Plan: VA MEDICARE PART A & B / Product Type: Medicare /  Onset Date:05/15/2020               Treatment Diagnosis: Right  Shoulder pain   Prior Hospitalization: see medical history Provider#: [id number]   Medications: Verified on Patient summary List    Comorbidities: OA, Hepatitis A ()   Prior Level of Function: I with all ADLs and household tasks, driving    Visits from Start of Care: 8    Missed Visits: 0    The Plan of Care and following information is based on the patient's current status:  STG 1: Patient to be I and compliant with HEP to increase PROM of right shoulder and maintain AROM of right elbow and wrist to progress per protocol and improve I with ADL.              Goal met  STG 2: Patient to report 3/10 pain or less for positioning for sleeping.              Met on average = 1-2/10     Long Term Goals: To be accomplished in 4 weeks:  LTG 1: Patient to improve FOTO score by 44 points to indicate improved function.               Progressing = 34  LTG 2: Patient to demonstrate full PROM per protocol for ADLs.                Progressing: flexion = 104, abduction = 76, ER = 10degrees, IR = to abdomen     Key functional changes:   Functional Gains: decreased pain, improved ADLs  Functional Deficits: sleeping, pain, AROM   % improvement: 30-40%  Pain   Average: 1-2/10       Best: 0/10     Worst: 7/10  Patient Goal: \"to be able to use my arm freely\"      Problems/ barriers to goal attainment: pain     Problem List: pain affecting function, decrease ROM, decrease strength, impaired gait/ balance, decrease ADL/ functional abilitiies, decrease activity tolerance and decrease flexibility/ joint mobility    Treatment Plan: Therapeutic exercise, Therapeutic activities, Neuromuscular re-education, Physical agent/modality, Manual therapy, Patient education, Self Care training, Functional mobility training and Home safety training     Goals for this certification period to be accomplished in 4 weeks:  1: Patient to improve FOTO score to 63 to indicate improved function.    Recert status: Progressing = 34  2: Patient to demonstrate full PROM per protocol for ADLs.     Recert status: Progressing: flexion = 104, abduction = 76, ER = 10degrees, IR = to abdomen   3. Pt will demonstrate right AAROM flexion to 120degrees in order to advance to next phase of protocol   Recert status: new goal: AAROM table slide = 77 degrees  4. Pt will demonstrate right AROM flexion to 120degrees in order to improve ease with ADLS   Recert status: new goal: just initiated AAROM due to pain levels    Frequency / Duration: Patient to be seen 2 times per week for 4 weeks:    Assessment / Recommendations:Ms. Ben Oliver is progressing with ROM per protocol. Pt pain levels are low at rest, however, she has decreased tolerance with shoulder ROM. Pt has empty end feel/is limited by guarding into all PROM. Pt initiated AAROM/AROM today to toleration and updated HEP. Pt will benefit from skilled PT in order to address listed deficits, decrease pain, and maximize functional potential.     Certification Period: 6/15/20-7/14/20    Nancy Cowden, PT 6/15/2020 3:32 PM    ________________________________________________________________________  I certify that the above Therapy Services are being furnished while the patient is under my care. I agree with the treatment plan and certify that this therapy is necessary.     [] I have read the above and request that my patient continue as recommended.   [] I have read the above report and request that my patient continue therapy with the following changes/special instructions: _______________________________________  [] I have read the above report and request that my patient be discharged from therapy    Physician's Signature:____________Date:_________TIME:________    ** Signature, Date and Time must be completed for valid certification **    Please sign and return to [facility] [facility]  [place]  [address] [place] [address]  [phone] fax

## 2020-06-17 ENCOUNTER — APPOINTMENT (OUTPATIENT)
Dept: PHYSICAL THERAPY | Age: 65
End: 2020-06-17
Payer: MEDICARE

## 2020-06-22 ENCOUNTER — HOSPITAL ENCOUNTER (OUTPATIENT)
Dept: PHYSICAL THERAPY | Age: 65
Discharge: HOME | End: 2020-06-22
Payer: MEDICARE

## 2020-06-22 PROCEDURE — 97110 THERAPEUTIC EXERCISES: CPT

## 2020-06-22 NOTE — PROGRESS NOTES
PT DAILY TREATMENT NOTE 10-18    Patient Name: Augustin Poe  Date:2020  : 1955  [x]  Patient  Verified  Payor: VA MEDICARE / Plan: VA MEDICARE PART A & B / Product Type: Medicare /    In time:330  Out time:427  Total Treatment Time (min): 62  Visit #: 1 of 8    Medicare/BCBS Only   Total Timed Codes (min):  47 1:1 Treatment Time:  52       Treatment Area: Pain in right shoulder [M25.511]  Complete rotator cuff tear or rupture of right shoulder, not specified as traumatic [M75.121]    SUBJECTIVE  Pain Level (0-10 scale): 1  Any medication changes, allergies to medications, adverse drug reactions, diagnosis change, or new procedure performed?: [x] No    [] Yes (see summary sheet for update)  Subjective functional status/changes:   [] No changes reported  Pt reports she is sore.  She has been doing her exercises at home because she missed last week    OBJECTIVE    Modality rationale: decrease inflammation and decrease pain to  Decrease post exercise soreness   Min Type Additional Details    [] Estim:  []Unatt       []IFC  []Premod                        []Other:  []w/ice   []w/heat  Position:  Location:    [] Estim: []Att    []TENS instruct  []NMES                    []Other:  []w/US   []w/ice   []w/heat  Position:  Location:    []  Traction: [] Cervical       []Lumbar                       [] Prone          []Supine                       []Intermittent   []Continuous Lbs:  [] before manual  [] after manual    []  Ultrasound: []Continuous   [] Pulsed                           []1MHz   []3MHz W/cm2:  Location:    []  Iontophoresis with dexamethasone         Location: [] Take home patch   [] In clinic    []  Ice     []  heat  []  Ice massage  []  Laser   []  Anodyne Position:  Location:    []  Laser with stim  []  Other:  Position:  Location:   10 [x]  Vasopneumatic Device Pressure:       [x] lo [] med [] hi   Temperature: [x] lo [] med [] hi   [] Skin assessment post-treatment:  [x]intact []redness- no adverse reaction    []redness  adverse reaction:     47 min Therapeutic Exercise:  [] See flow sheet :   Rationale: increase ROM to improve the patients ability to progress to next phase of protocol           With   [] TE   [] TA   [] neuro   [] other: Patient Education: [x] Review HEP    [] Progressed/Changed HEP based on:   [] positioning   [] body mechanics   [] transfers   [] heat/ice application    [] other:      Other Objective/Functional Measures: progressed exercises per flow sheet; right PROM flexion = 118degrees, abduction = 100, ER = 20degrees     Pain Level (0-10 scale) post treatment: 0    ASSESSMENT/Changes in Function: Pt tolerated progression of exercises with out increase in symptoms. Pt remains significantly limited with AAROM due to discomfort, but was able to initiate pulleys today. Pt PROM is improving as seen above in objective measurements. Patient will continue to benefit from skilled PT services to modify and progress therapeutic interventions, address functional mobility deficits, address ROM deficits, address strength deficits, analyze and address soft tissue restrictions, analyze and cue movement patterns, analyze and modify body mechanics/ergonomics, assess and modify postural abnormalities, address imbalance/dizziness and instruct in home and community integration to attain remaining goals. []  See Plan of Care  []  See progress note/recertification  []  See Discharge Summary         Progress towards goals / Updated goals:  1: Patient to improve FOTO score to 63 to indicate improved function.               Recert status: Progressing = 34   To be reassessed at recert  2: Patient to demonstrate full PROM per protocol for ADLs.                Recert status: Progressing: flexion = 104, abduction = 76, ER = 10degrees, IR = to abdomen    Progressing: flexion = 118degrees, abduction = 100, ER = 20degrees   3.  Pt will demonstrate right AAROM flexion to 120degrees in order to advance to next phase of protocol              Recert status: new goal: AAROM table slide = 77 degrees   AAROM on pulleys approximately 45degrees  4.  Pt will demonstrate right AROM flexion to 120degrees in order to improve ease with ADLS              Recert status: new goal: just initiated AAROM due to pain levels   Not yet initiated due to limited PROM/AAROM    PLAN  [x]  Upgrade activities as tolerated     [x]  Continue plan of care  []  Update interventions per flow sheet       []  Discharge due to:_  []  Other:_      Milagro Tierney, PT 6/22/2020  3:42 PM    Future Appointments   Date Time Provider Jenny Bro   6/24/2020  3:30 PM Jerzy Howard, PT [facility] [id number]   7/9/2020  9:00 AM [address] [place], [place] [address]JACQUELINE 67

## 2020-06-24 ENCOUNTER — APPOINTMENT (OUTPATIENT)
Dept: PHYSICAL THERAPY | Age: 65
End: 2020-06-24
Payer: MEDICARE

## 2020-06-30 ENCOUNTER — HOSPITAL ENCOUNTER (OUTPATIENT)
Dept: PHYSICAL THERAPY | Age: 65
Discharge: HOME | End: 2020-06-30
Payer: MEDICARE

## 2020-06-30 PROCEDURE — 97110 THERAPEUTIC EXERCISES: CPT

## 2020-07-10 ENCOUNTER — HOSPITAL ENCOUNTER (OUTPATIENT)
Dept: PHYSICAL THERAPY | Age: 65
Discharge: HOME | End: 2020-07-10
Payer: MEDICARE

## 2020-07-10 PROCEDURE — 97110 THERAPEUTIC EXERCISES: CPT

## 2020-07-10 NOTE — PROGRESS NOTES
[facility] - [facility]  [address] [place] [address]  [phone] fax    Continued Plan of Care/ Re-certification for Physical Therapy Services    Patient name: Teresa Bates Start of Care: 5/20/2020   Referral Gabriella Vasquez,* EXX: 4/11/2487               Medical Diagnosis: Pain in right shoulder [M25.511]  Complete rotator cuff tear or rupture of right shoulder, not specified as traumatic [M75.121]  Payor: VA MEDICARE / Plan: VA MEDICARE PART A & B / Product Type: Medicare /  Onset Date:05/15/2020               Treatment Diagnosis: Right  Shoulder pain   Prior Hospitalization: see medical history Provider#: [id number]   Medications: Verified on Patient summary List    Comorbidities: OA, Hepatitis A (1973)   Prior Level of Function: I with all ADLs and household tasks, driving    Visits from Start of Care: 11    Missed Visits: 2    The Plan of Care and following information is based on the patient's current status:  Goal: Patient to improve FOTO score to 63 to indicate improved function.   Status at last note/certification: 19  Current Status: not met    Goal: Patient to demonstrate full PROM per protocol for ADLs.   Status at last note/certification: flex: 104 degrees, abd: 76 degrees, ER: 10 degrees  Current Status: not met    Goal: Pt will demonstrate right AAROM flexion to 120degrees in order to advance to next phase of protocol  Status at last note/certification: flex: 77 degrees during table slides  Current Status: not met    Goal: Pt will demonstrate right AROM flexion to 120degrees in order to improve ease with ADLS  Status at last note/certification: unable to perform  Current Status: not met    Key functional changes: improving FOTO score, improving right shoulder mobility       Problems/ barriers to goal attainment: missed appointments secondary to  passing away     Problem List: pain affecting function, decrease ROM, decrease strength, decrease ADL/ functional abilitiies, decrease activity tolerance, decrease flexibility/ joint mobility and decrease transfer abilities    Treatment Plan: Therapeutic exercise, Therapeutic activities, Neuromuscular re-education, Physical agent/modality, Gait/balance training, Manual therapy, Patient education, Self Care training and Functional mobility training     Patient Goal (s) has been updated and includes: to reach better      Goals for this certification period to be accomplished in 4 weeks:  1. Patient will improve FOTO score by 44 points in order to demonstrate a significant improvement in function. 2. Patient will improve right shoulder AROM flexion/abd to 90 degrees in order to increase ease of ADLs. 3. Patient will improve behind back reaching to sacrum in order to increase ease of getting dressed. 4. Patient will improve right shoulder MMT to 4-/5 in order to increase ease of household chores    Frequency / Duration: Patient to be seen 2 times per week for 4 weeks:    Assessment / Recommendations: pt. Is progressing slowly towards goals. Progress was limited by having missed appointments secondary to her  passing away. She reports she has tried to keep up with her HEP. Right shoulder PROM flex: 116 degrees, abd: 114 degrees, and ER: 15 degrees with firm end feel at end ranges. Right shoulder AROM: flex: 51 degrees, abd: 52 degrees with significant shrugging. FOTO score progressed to 43 points. She does having improving pain control but does have mild soreness in shoulder. Skilled PT is medically necessary in order to improve right shoulder mobility and strength for increased ease of ADLs and improved quality of life. Certification Period: 7/10/20-8/9/20    Dominique Deleon, PT 7/10/2020 8:54 AM    ________________________________________________________________________  I certify that the above Therapy Services are being furnished while the patient is under my care.  I agree with the treatment plan and certify that this therapy is necessary. [] I have read the above and request that my patient continue as recommended.   [] I have read the above report and request that my patient continue therapy with the following changes/special instructions: _______________________________________  [] I have read the above report and request that my patient be discharged from therapy    Physician's Signature:____________Date:_________TIME:________    ** Signature, Date and Time must be completed for valid certification **    Please sign and return to [facility]  [address] [place] [address]  [phone] fax

## 2020-07-10 NOTE — PROGRESS NOTES
PT DAILY TREATMENT NOTE 10-18    Patient Name: Bryan Cortes  Date:7/10/2020  : 1955  [x]  Patient  Verified  Payor: VA MEDICARE / Plan: VA MEDICARE PART A & B / Product Type: Medicare /    In time: 9:28  Out time:10:11  Total Treatment Time (min): 43  Visit #: 3 of 8    Medicare/BCBS Only   Total Timed Codes (min):  43 1:1 Treatment Time:  43       Treatment Area: Pain in right shoulder [M25.511]  Complete rotator cuff tear or rupture of right shoulder, not specified as traumatic [M75.121]    SUBJECTIVE  Pain Level (0-10 scale): 0/10  Any medication changes, allergies to medications, adverse drug reactions, diagnosis change, or new procedure performed?: [x] No    [] Yes (see summary sheet for update)  Subjective functional status/changes:   [] No changes reported  Pt. Reports she is just feeling sore today. Her 's  was yesterday.      OBJECTIVE    43 min Therapeutic Exercise:  [x] See flow sheet :   Rationale: increase ROM and increase strength to improve the patients ability to increase ease of reaching          With   [x] TE   [] TA   [] neuro   [] other: Patient Education: [x] Review HEP    [] Progressed/Changed HEP based on:   [] positioning   [] body mechanics   [] transfers   [] heat/ice application    [] other:      Other Objective/Functional Measures:   Right shoulder flex: 51 degrees abd: 52 degrees  Right shoulder PROM flex: 116 degrees abd: 114 degrees ER: 15 degrees  FOTO: 43     Pain Level (0-10 scale) post treatment: 0/10    ASSESSMENT/Changes in Function:     See progress note          Progress towards goals / Updated goals:  See progress note    PLAN  []  Upgrade activities as tolerated     [x]  Continue plan of care  []  Update interventions per flow sheet       []  Discharge due to:_  []  Other:_      Maeve Chauhan, PT 7/10/2020  7:38 AM    Future Appointments   Date Time Provider Jenny Bro   7/10/2020  9:30 AM Ella Connolly PT [facility] 7/13/2020  3:45 PM Kenia Montenegro PA [address]

## 2020-07-13 ENCOUNTER — HOSPITAL ENCOUNTER (OUTPATIENT)
Dept: PHYSICAL THERAPY | Age: 65
Discharge: HOME | End: 2020-07-13
Payer: MEDICARE

## 2020-07-13 PROCEDURE — 97110 THERAPEUTIC EXERCISES: CPT

## 2020-07-13 NOTE — PROGRESS NOTES
PT DAILY TREATMENT NOTE 10-18    Patient Name: Dylan Lisa  Date:2020  : 1955  [x]  Patient  Verified  Payor:  / Plan: [facility]  FOR LIFE / Product Type:  /    In time: 9:31  Out time: 10:11  Total Treatment Time (min): 40  Visit #: 1 of 8    Medicare/BCBS Only   Total Timed Codes (min):  40 1:1 Treatment Time:  40       Treatment Area: Pain in right shoulder [M25.511]  Complete rotator cuff tear or rupture of right shoulder, not specified as traumatic [M75.121]    SUBJECTIVE  Pain Level (0-10 scale):  0/10  Any medication changes, allergies to medications, adverse drug reactions, diagnosis change, or new procedure performed?: [x] No    [] Yes (see summary sheet for update)  Subjective functional status/changes:   [] No changes reported  Pt. Reports she is feeling pretty good today but still has that knot in her shoulder. She reports she tried doing the exercise with the cane for her HEP. OBJECTIVE    40 min Therapeutic Exercise:  [x] See flow sheet :   Rationale: increase ROM and increase strength to improve the patients ability to increase ease of reaching          With   [x] TE   [] TA   [] neuro   [] other: Patient Education: [x] Review HEP    [] Progressed/Changed HEP based on:   [] positioning   [] body mechanics   [] transfers   [] heat/ice application    [] other:      Other Objective/Functional Measures:   Pt. Tolerated PT well with no reports of increased pain   She continues to have shrugging during pulleys  She continues to require assistance with flexion and abduction in side lying       Pain Level (0-10 scale) post treatment: 0/10    ASSESSMENT/Changes in Function:  Pt. Is progressing slowly towards goals. She continues to have good pain control but decreased right shoulder AROM. She also continues to have decreased AAROM mobility and excessive shrugging with reaching.      Patient will continue to benefit from skilled PT services to modify and progress therapeutic interventions, address functional mobility deficits, address ROM deficits, address strength deficits, analyze and address soft tissue restrictions, analyze and cue movement patterns, analyze and modify body mechanics/ergonomics and assess and modify postural abnormalities to attain remaining goals. Progress towards goals / Updated goals:  1. Patient will improve FOTO score by 44 points in order to demonstrate a significant improvement in function. 2. Patient will improve right shoulder AROM flexion/abd to 90 degrees in order to increase ease of ADLs. 3. Patient will improve behind back reaching to sacrum in order to increase ease of getting dressed.   4. Patient will improve right shoulder MMT to 4-/5 in order to increase ease of household chores    PLAN  []  Upgrade activities as tolerated     [x]  Continue plan of care  []  Update interventions per flow sheet       []  Discharge due to:_  []  Other:_      Nikunj Lynch, PT 7/13/2020  7:36 AM    Future Appointments   Date Time Provider Jenny Bro   7/13/2020  9:30 AM Vernel Lipschutz, PT [email or web address]   7/13/2020  3:45 PM Mack [facility]rique [facility], [address] [place] [address]   7/16/2020 11:00 AM Vernel Lipschutz, PT [facility]   7/20/2020  8:45 AM Vernel Lipschutz, PT [facility]   7/23/2020 10:15 AM Vernel Lipschutz, PT [facility]   7/27/2020  8:45 AM Vernel Lipschutz, PT [id number] [facility]   7/30/2020  9:30 AM Vernel Lipschutz, PT [facility]

## 2020-07-16 ENCOUNTER — HOSPITAL ENCOUNTER (OUTPATIENT)
Dept: PHYSICAL THERAPY | Age: 65
Discharge: HOME | End: 2020-07-16
Payer: MEDICARE

## 2020-07-16 PROCEDURE — 97110 THERAPEUTIC EXERCISES: CPT

## 2020-07-16 NOTE — PROGRESS NOTES
PT DAILY TREATMENT NOTE 10-18    Patient Name: Merline Diaz  Date:2020  : 1955  [x]  Patient  Verified  Payor: [place] MEDICARE / Plan: [place] MEDICARE PART A & B / Product Type: Medicare /    In time: 11:00  Out time: 11:38  Total Treatment Time (min): 38  Visit #: 2 of 8    Medicare/BCBS Only   Total Timed Codes (min):  38 1:1 Treatment Time:  38       Treatment Area: Pain in right shoulder [M25.511]  Complete rotator cuff tear or rupture of right shoulder, not specified as traumatic [M75.121]    SUBJECTIVE  Pain Level (0-10 scale):  1/10  Any medication changes, allergies to medications, adverse drug reactions, diagnosis change, or new procedure performed?: [x] No    [] Yes (see summary sheet for update)  Subjective functional status/changes:   [] No changes reported  Pt. Reports she is feeling sore today. OBJECTIVE    38 min Therapeutic Exercise:  [x] See flow sheet :   Rationale: increase ROM and increase strength to improve the patients ability to increase ease of ADLs          With   [x] TE   [] TA   [] neuro   [] other: Patient Education: [x] Review HEP    [] Progressed/Changed HEP based on:   [] positioning   [] body mechanics   [] transfers   [] heat/ice application    [] other:      Other Objective/Functional Measures:   Right shoulder PROM: flex: 115 degrees abd: 95 degrees with firm end feel at end ranges  Right shoulder AAROM flex: 115 degrees with stick, poor mobility with pulleys with excessive shrugging. Poor AROM mobility with excessive shrugging to initiate movement     Pain Level (0-10 scale) post treatment: 1/10    ASSESSMENT/Changes in Function:  Pt. Is progressing slowly towards goals. She continues to have poor right shoulder AROM with excessive shrugging when attempting. PROM is slowly improving and she has improved AAROM mobility today.      Patient will continue to benefit from skilled PT services to modify and progress therapeutic interventions, address functional mobility deficits, address ROM deficits, address strength deficits, analyze and address soft tissue restrictions, analyze and cue movement patterns and analyze and modify body mechanics/ergonomics to attain remaining goals. Progress towards goals / Updated goals:  1. Patient will improve FOTO score by 44 points in order to demonstrate a significant improvement in function. 2. Patient will improve right shoulder AROM flexion/abd to 90 degrees in order to increase ease of ADLs. Not met: continues to have limited mobility with excessive shrugging with initiating (7/16/20)  3. Patient will improve behind back reaching to sacrum in order to increase ease of getting dressed.   4. Patient will improve right shoulder MMT to 4-/5 in order to increase ease of household chores    PLAN  []  Upgrade activities as tolerated     [x]  Continue plan of care  []  Update interventions per flow sheet       []  Discharge due to:_  []  Other:_      Matthew Carrasquillo PT 7/16/2020  7:38 AM    Future Appointments   Date Time Provider Jenny Bro   7/16/2020 11:00 AM Idania Phelps PT [facility]   7/20/2020  8:45 AM Idania Phelps, PT [facility]   7/20/2020  2:00 PM JACQUELINE [address] 67   7/23/2020 10:15 AM Idania Phelps PT [facility]   7/27/2020  8:45 AM Idania Phelps PT [id number] [facility]   7/30/2020  9:30 AM Idania Phelps PT [facility]

## 2020-07-20 ENCOUNTER — OFFICE VISIT (OUTPATIENT)
Dept: ORTHOPEDIC SURGERY | Age: 65
End: 2020-07-20

## 2020-07-20 ENCOUNTER — HOSPITAL ENCOUNTER (OUTPATIENT)
Dept: PHYSICAL THERAPY | Age: 65
Discharge: HOME | End: 2020-07-20
Payer: MEDICARE

## 2020-07-20 VITALS
OXYGEN SATURATION: 94 % | TEMPERATURE: 98.4 F | SYSTOLIC BLOOD PRESSURE: 118 MMHG | HEIGHT: 67 IN | HEART RATE: 101 BPM | BODY MASS INDEX: 20.34 KG/M2 | DIASTOLIC BLOOD PRESSURE: 73 MMHG | WEIGHT: 129.6 LBS | RESPIRATION RATE: 16 BRPM

## 2020-07-20 DIAGNOSIS — M75.101 ROTATOR CUFF TEAR ARTHROPATHY OF RIGHT SHOULDER: Primary | ICD-10-CM

## 2020-07-20 DIAGNOSIS — M12.811 ROTATOR CUFF TEAR ARTHROPATHY OF RIGHT SHOULDER: Primary | ICD-10-CM

## 2020-07-20 PROCEDURE — 97140 MANUAL THERAPY 1/> REGIONS: CPT

## 2020-07-20 PROCEDURE — 97110 THERAPEUTIC EXERCISES: CPT

## 2020-07-20 NOTE — PROGRESS NOTES
Dylan Lisa  1955     HISTORY OF PRESENT ILLNESS  Dylan Lisa is a 77 y.o. female who presents today for evaluation s/p Right reverse total shoulder arthroplasty on 5/15/2020. Patient has been going to PT. Pain is a 1/10. Gets sore from time to time. Patient denies any fever, chills, chest pain, shortness of breath or calf pain. The remainder of the review of systems is negative. There are no new illness or injuries to report since last seen in the office. No changes in medications, allergies, social or family history. Pain Assessment  7/20/2020   Location of Pain Shoulder   Location Modifiers Right   Severity of Pain 1   Quality of Pain (No Data)   Quality of Pain Comment soreness   Duration of Pain A few hours   Frequency of Pain Intermittent   Aggravating Factors Exercise   Aggravating Factors Comment -   Limiting Behavior -   Relieving Factors Rest   Relieving Factors Comment -   Result of Injury No           PHYSICAL EXAM:   Visit Vitals  /73 (BP 1 Location: Left arm, BP Patient Position: Sitting)   Pulse (!) 101   Temp 98.4 °F (36.9 °C) (Oral)   Resp 16   Ht 5' 7\" (1.702 m)   Wt 129 lb 9.6 oz (58.8 kg)   SpO2 94%   BMI 20.30 kg/m²      The patient is a well-developed, well-nourished female in no acute distress. The patient is alert and oriented times three. The patient appears to be well groomed. Mood and affect are normal.  ORTHOPEDIC EXAM of right shoulder:  Inspection: swelling not present,  Bruising not present  Incision well healed  Passive glenohumeral abduction 0-60 degrees, able to reach to side of her face  Stability: Stable  Strength: 4/5  2+ distal pulses    IMPRESSION:  S/P Right reverse total shoulder arthroplasty    PLAN:   1. Patient doing well post operatively. Slightly behind in rom. Will cont to monitor  2. Will cont with PT. Cleopatra Dominguez for strengthening.  Transition to HEP when ready      RTC 6 weeks    J[facility]

## 2020-07-20 NOTE — PROGRESS NOTES
PT DAILY TREATMENT NOTE 10-18    Patient Name: Opal Patterson  Date:2020  : 1955  [x]  Patient  Verified  Payor: [place] MEDICARE / Plan: [place] MEDICARE PART A & B / Product Type: Medicare /    In time: 8:44  Out time: 9:26  Total Treatment Time (min): 42  Visit #: 3 of 8    Medicare/BCBS Only   Total Timed Codes (min):  42 1:1 Treatment Time:  42       Treatment Area: Pain in right shoulder [M25.511]  Complete rotator cuff tear or rupture of right shoulder, not specified as traumatic [M75.121]    SUBJECTIVE  Pain Level (0-10 scale):  1/10  Any medication changes, allergies to medications, adverse drug reactions, diagnosis change, or new procedure performed?: [x] No    [] Yes (see summary sheet for update)  Subjective functional status/changes:   [] No changes reported  Pt. Reports she still feels sore. She reports she has been using her arm more at home, but hasn't been doing her HEP like she should. She reports she is able to wash her hair better    OBJECTIVE    32 min Therapeutic Exercise:  [x] See flow sheet :   Rationale: increase ROM and increase strength to improve the patients ability to increase ease of ADLs    10 min Manual Therapy:  Trigger point release to deltoid and infraspinatus, scap mobs   Rationale: decrease pain, increase ROM and increase tissue extensibility to increase ease of reaching          With   [x] TE   [] TA   [] neuro   [] other: Patient Education: [x] Review HEP    [] Progressed/Changed HEP based on:   [] positioning   [] body mechanics   [] transfers   [] heat/ice application    [] other:      Other Objective/Functional Measures:   Pt.  Was re-educated on importance of her HEP  She required assistance to perform abduction with stick  She continues to require assistance during side lying AROM exercises   She required manual cues to perform abduction isometrics correctly  Behind back reaching was to right SIJ    Pain Level (0-10 scale) post treatment: 1/10    ASSESSMENT/Changes in Function:  Pt. Is progressing slowly towards goals. She continues to have significant decrease in right shoulder strength and mobility. She has good pain control but does have reports of soreness and stiffness especially in the morning. Patient will continue to benefit from skilled PT services to modify and progress therapeutic interventions, address functional mobility deficits, address ROM deficits, address strength deficits, analyze and address soft tissue restrictions, analyze and cue movement patterns, analyze and modify body mechanics/ergonomics and assess and modify postural abnormalities to attain remaining goals. Progress towards goals / Updated goals:  1. Patient will improve FOTO score by 44 points in order to demonstrate a significant improvement in function. 2. Patient will improve right shoulder AROM flexion/abd to 90 degrees in order to increase ease of ADLs. Not met: continues to have limited mobility with excessive shrugging with initiating (7/16/20)  3. Patient will improve behind back reaching to sacrum in order to increase ease of getting dressed.   4. Patient will improve right shoulder MMT to 4-/5 in order to increase ease of household chores    PLAN  []  Upgrade activities as tolerated     [x]  Continue plan of care  []  Update interventions per flow sheet       []  Discharge due to:_  []  Other:_      Maximus Witt PT 7/20/2020  7:37 AM    Future Appointments   Date Time Provider Jenny Bro   7/20/2020  8:45 AM Pradeepestela Almazan PT [facility]   7/20/2020  2:00 PM [place], [place] [address]   7/23/2020 10:15 AM Pradeepestela Almazan PT [facility]   7/27/2020  8:45 AM Pradeep Iba, PT [id number] [facility]   7/30/2020  9:30 AM Pradeepestela Almazan PT [facility]

## 2020-07-23 ENCOUNTER — HOSPITAL ENCOUNTER (OUTPATIENT)
Dept: PHYSICAL THERAPY | Age: 65
Discharge: HOME | End: 2020-07-23
Payer: MEDICARE

## 2020-07-23 PROCEDURE — 97140 MANUAL THERAPY 1/> REGIONS: CPT

## 2020-07-23 PROCEDURE — 97110 THERAPEUTIC EXERCISES: CPT

## 2020-07-23 NOTE — PROGRESS NOTES
PT DAILY TREATMENT NOTE 10-18    Patient Name: Benjamin Blanchard  Date:2020  : 1955  [x]  Patient  Verified  Payor: [place] MEDICARE / Plan: [place] MEDICARE PART A & B / Product Type: Medicare /    In time: 10:15  Out time: 10:54  Total Treatment Time (min): 39  Visit #: 4 of 8    Medicare/BCBS Only   Total Timed Codes (min):  39 1:1 Treatment Time:  39       Treatment Area: Pain in right shoulder [M25.511]  Complete rotator cuff tear or rupture of right shoulder, not specified as traumatic [M75.121]    SUBJECTIVE  Pain Level (0-10 scale):  1/10  Any medication changes, allergies to medications, adverse drug reactions, diagnosis change, or new procedure performed?: [x] No    [] Yes (see summary sheet for update)  Subjective functional status/changes:   [] No changes reported  Pt. Reports she is just sore today. OBJECTIVE    31 min Therapeutic Exercise:  [x] See flow sheet :   Rationale: increase ROM and increase strength to improve the patients ability to increase ease of ADLs    8 min Manual Therapy:  Trigger point release to right infraspinatus and teres minor   Rationale: decrease pain, increase ROM and increase tissue extensibility to increase ease of reaching          With   [x] TE   [] TA   [] neuro   [] other: Patient Education: [x] Review HEP    [] Progressed/Changed HEP based on:   [] positioning   [] body mechanics   [] transfers   [] heat/ice application    [] other:      Other Objective/Functional Measures:   Pt. Was re-educated on importance of her HEP   Some improvement in mobility following manual  She was educated on self massage with ball at home  She continues to require assistance for side lying flexion and abduction exercises    Pain Level (0-10 scale) post treatment: 1/10    ASSESSMENT/Changes in Function:  Pt. Is progressing slowly towards goals. She continues to have decreased right shoulder strength and mobility.  She is semi compliant with her HEP currently and was educated on performing more frequently at home. Patient will continue to benefit from skilled PT services to modify and progress therapeutic interventions, address functional mobility deficits, address ROM deficits, address strength deficits, analyze and address soft tissue restrictions, analyze and cue movement patterns, analyze and modify body mechanics/ergonomics and assess and modify postural abnormalities to attain remaining goals. Progress towards goals / Updated goals:  1. Patient will improve FOTO score by 44 points in order to demonstrate a significant improvement in function. 2. Patient will improve right shoulder AROM flexion/abd to 90 degrees in order to increase ease of ADLs.    Not met: continues to have limited mobility with excessive shrugging with initiating (7/16/20)  3. Patient will improve behind back reaching to sacrum in order to increase ease of getting dressed. Not met: pt. Continue to have difficulty with behind back reaching (7/23/20)  4.  Patient will improve right shoulder MMT to 4-/5 in order to increase ease of household chores    PLAN  []  Upgrade activities as tolerated     [x]  Continue plan of care  []  Update interventions per flow sheet       []  Discharge due to:_  []  Other:_      Aden Jhonathan, PT 7/23/2020  7:45 AM    Future Appointments   Date Time Provider Jenny Bro   7/23/2020 10:15 AM Elías Alaniz PT [facility]   7/27/2020  8:45 AM Elías Alaniz PT [facility]   7/30/2020  9:30 AM [facility]ías Alaniz PT [facility]   8/31/2020  1:00 PM Juan F Ozuna PA [address]

## 2020-07-27 ENCOUNTER — HOSPITAL ENCOUNTER (OUTPATIENT)
Dept: PHYSICAL THERAPY | Age: 65
Discharge: HOME | End: 2020-07-27
Payer: MEDICARE

## 2020-07-27 PROCEDURE — 97110 THERAPEUTIC EXERCISES: CPT

## 2020-07-27 PROCEDURE — 97140 MANUAL THERAPY 1/> REGIONS: CPT

## 2020-07-27 NOTE — PROGRESS NOTES
PT DAILY TREATMENT NOTE 10-18    Patient Name: Daphne Vitale  Date:2020  : 1955  [x]  Patient  Verified  Payor: [place] MEDICARE / Plan: [place] MEDICARE PART A & B / Product Type: Medicare /    In time: 8:45  Out time: 9:25  Total Treatment Time (min): 40  Visit #: 5 of 8    Medicare/BCBS Only   Total Timed Codes (min):  40 1:1 Treatment Time:  40       Treatment Area: Pain in right shoulder [M25.511]  Complete rotator cuff tear or rupture of right shoulder, not specified as traumatic [M75.121]    SUBJECTIVE  Pain Level (0-10 scale):  1/10  Any medication changes, allergies to medications, adverse drug reactions, diagnosis change, or new procedure performed?: [x] No    [] Yes (see summary sheet for update)  Subjective functional status/changes:   [] No changes reported  Pt. Reports she is still having soreness in her shoulder. She reports she has been doing pulleys at home and her stick exercises. OBJECTIVE    32 min Therapeutic Exercise:  [x] See flow sheet :   Rationale: increase ROM and increase strength to improve the patients ability to increase ease of reaching    8 min Manual Therapy:  Trigger point release to right infraspinatus, scap mobs   Rationale: decrease pain, increase ROM and increase tissue extensibility to increase ease of reaching          With   [x] TE   [] TA   [] neuro   [] other: Patient Education: [x] Review HEP    [] Progressed/Changed HEP based on:   [] positioning   [] body mechanics   [] transfers   [] heat/ice application    [] other:      Other Objective/Functional Measures:   Right shoulder AROM flex: 62 abd: 60 degrees with excessive shrugging. Right shoulder PROm: flex: 116 degrees  Pt. Tolerated PT well with no reports of increased pain    Pain Level (0-10 scale) post treatment:  0/10    ASSESSMENT/Changes in Function: pt. Is progressing slowly towards. She continues to demonstrate good PROM of right shoulder but has limited AROM.  She continues to have excessive shrugging with right shoulder AROM. Patient will continue to benefit from skilled PT services to modify and progress therapeutic interventions, address functional mobility deficits, address ROM deficits, address strength deficits, analyze and address soft tissue restrictions, analyze and cue movement patterns, analyze and modify body mechanics/ergonomics and assess and modify postural abnormalities to attain remaining goals. Progress towards goals / Updated goals:  1. Patient will improve FOTO score by 44 points in order to demonstrate a significant improvement in function. 2. Patient will improve right shoulder AROM flexion/abd to 90 degrees in order to increase ease of ADLs.    Not met: right shoulder AROM flex: 62 degrees abd: 60 degrees (7/27/20)  3. Patient will improve behind back reaching to sacrum in order to increase ease of getting dressed. Not met: pt. Continue to have difficulty with behind back reaching (7/23/20)  4.  Patient will improve right shoulder MMT to 4-/5 in order to increase ease of household chores    PLAN  []  Upgrade activities as tolerated     [x]  Continue plan of care  []  Update interventions per flow sheet       []  Discharge due to:_  []  Other:_      Constantino Espino, PT 7/27/2020  7:37 AM    Future Appointments   Date Time Provider Jenny Bor   7/27/2020  8:45 AM Jovanny Pump, PT [facility]   7/30/2020  9:30 AM Jovanny Pump, PT [facility]   8/31/2020  1:00 PM [address], [address]

## 2020-07-30 ENCOUNTER — HOSPITAL ENCOUNTER (OUTPATIENT)
Dept: PHYSICAL THERAPY | Age: 65
Discharge: HOME | End: 2020-07-30
Payer: MEDICARE

## 2020-07-30 PROCEDURE — 97110 THERAPEUTIC EXERCISES: CPT

## 2020-07-30 NOTE — PROGRESS NOTES
PT DAILY TREATMENT NOTE 10-18    Patient Name: Luis Goldberg  Date:2020  : 1955  [x]  Patient  Verified  Payor: [place] MEDICARE / Plan: [place] MEDICARE PART A & B / Product Type: Medicare /    In time: 9:26  Out time:10:05   Total Treatment Time (min): 39    Visit #: 6 of 8    Medicare/BCBS Only   Total Timed Codes (min):  39 1:1 Treatment Time:  39       Treatment Area: Pain in right shoulder [M25.511]  Complete rotator cuff tear or rupture of right shoulder, not specified as traumatic [M75.121]    SUBJECTIVE  Pain Level (0-10 scale): 0/10          Any medication changes, allergies to medications, adverse drug reactions, diagnosis change, or new procedure performed?: [x] No    [] Yes (see summary sheet for update)  Subjective functional status/changes:   [] No changes reported  Pt stated she has been doing well and denies soreness since last session. She reported that she has increased ease with ADLs (showering, dressing, brushing hair, etc.) and is able to perform these activities without assistance. Pt also reported compliance with HEP. OBJECTIVE    39 min Therapeutic Exercise:  [x] See flow sheet :   Rationale: increase ROM and increase strength to improve the patients ability to perform ADLs independently and pain free      With   [x] TE   [] TA   [] neuro   [] other: Patient Education: [x] Review HEP    [] Progressed/Changed HEP based on:   [] positioning   [] body mechanics   [] transfers   [] heat/ice application    [] other:      Other Objective/Functional Measures:   Pt tolerated pt well with no reports of increased pain. Limited shrugging during behind the back and reaching exercises   Tolerated new exercises well (see flow chart); progress to 1 lb dumbbells for sidelying external rotation exercises and elevate 30 degrees for seated stick exercises    Pain Level (0-10 scale) post treatment: 0/10    ASSESSMENT/Changes in Function:   Pt is slowly progressing towards her goals.  She continues to demonstrate limited AROM and strength of the right shoulder, but is performing better overall. Her PROM is good, and she demonstrates less shrugging with right shoulder AROM than last session. Pt expresses motivation to maintain compliance with HEP in order to increase ease of ADLs. Patient will continue to benefit from skilled PT services to modify and progress therapeutic interventions, address functional mobility deficits, address ROM deficits, address strength deficits, analyze and address soft tissue restrictions, analyze and cue movement patterns, analyze and modify body mechanics/ergonomics and assess and modify postural abnormalities to attain remaining goals. Progress towards goals / Updated goals:  1. Patient will improve FOTO score by 44 points in order to demonstrate a significant improvement in function. 2. Patient will improve right shoulder AROM flexion/abd to 90 degrees in order to increase ease of ADLs.    Not met: right shoulder AROM flex: 62 degrees abd: 60 degrees (7/27/20)  3. Patient will improve behind back reaching to sacrum in order to increase ease of getting dressed. Not met: pt. Continue to have difficulty with behind back reaching (7/23/20)  4. Patient will improve right shoulder MMT to 4-/5 in order to increase ease of household chores    PLAN  []  Upgrade activities as tolerated     [x]  Continue plan of care  []  Update interventions per flow sheet       []  Discharge due to:_   []  Other:_      Darrell White 7/30/2020  9:23 AM    I was present during the entire treatment, directing and participating in the treatment.    Juice Garcia DPT      Future Appointments   Date Time Provider Jenny Bro   7/30/2020  9:30 AM [facility]   8/31/2020  1:00 PM Yunier Shanks PA [address]

## 2020-08-03 ENCOUNTER — HOSPITAL ENCOUNTER (OUTPATIENT)
Dept: PHYSICAL THERAPY | Age: 65
Discharge: HOME | End: 2020-08-03
Payer: MEDICARE

## 2020-08-03 PROCEDURE — 97110 THERAPEUTIC EXERCISES: CPT

## 2020-08-03 NOTE — PROGRESS NOTES
PT DAILY TREATMENT NOTE 10-18    Patient Name: Jerome Wilkes  Date:8/3/2020  : 1955  [x]  Patient  Verified  Payor: [place] MEDICARE / Plan: [place] MEDICARE PART A & B / Product Type: Medicare /    In time: 12:50  Out time: 1:33     Total Treatment Time (min): 43  Visit #: 7 of 8    Medicare/BCBS Only   Total Timed Codes (min):  43 1:1 Treatment Time:  43       Treatment Area: Pain in right shoulder [M25.511]  Complete rotator cuff tear or rupture of right shoulder, not specified as traumatic [M75.121]    SUBJECTIVE  Pain Level (0-10 scale): 0/10  Any medication changes, allergies to medications, adverse drug reactions, diagnosis change, or new procedure performed?: [x] No    [] Yes (see summary sheet for update)  Subjective functional status/changes:   [] No changes reported  Pt stated that she was doing well, but her arm was a little sore. She stated that she has been complaint with     OBJECTIVE       43 min Therapeutic Exercise:  [x] See flow sheet :   Rationale: increase ROM and increase strength to improve the patients ability to ADLs independently and pain free            With   [x] TE   [] TA   [] neuro   [] other: Patient Education: [x] Review HEP    [] Progressed/Changed HEP based on:   [] positioning   [] body mechanics   [] transfers   [] heat/ice application    [] other:      Other Objective/Functional Measures:   Pt tolerated exercises well. Demonstrated improvements with AAROM exercises in sidelying (see flow chart) and seated with 30 degrees of incline      Pain Level (0-10 scale) post treatment: 0/10    ASSESSMENT/Changes in Function: Pt is steadily progressing towards goals. AROM and strength of the right shoulder is still limited, but pt is performing exercises and activities with minimal shrugging. Pt has also gained strength with AROM ER of the right shoulder.      Patient will continue to benefit from skilled PT services to modify and progress therapeutic interventions, address functional mobility deficits, address ROM deficits, address strength deficits, analyze and address soft tissue restrictions, analyze and cue movement patterns, analyze and modify body mechanics/ergonomics and assess and modify postural abnormalities to attain remaining goals. Progress towards goals / Updated goals:  1. Patient will improve FOTO score by 44 points in order to demonstrate a significant improvement in function. 2. Patient will improve right shoulder AROM flexion/abd to 90 degrees in order to increase ease of ADLs.    Not met: right shoulder AROM flex: 62 degrees abd: 60 degrees (7/27/20)  3. Patient will improve behind back reaching to sacrum in order to increase ease of getting dressed. Not met: pt. Continue to have difficulty with behind back reaching (7/23/20)  4. Patient will improve right shoulder MMT to 4-/5 in order to increase ease of household chores       PLAN  []  Upgrade activities as tolerated     []  Continue plan of care  []  Update interventions per flow sheet       []  Discharge due to:_  []  Other:_      Phani Spivey 8/3/2020  12:53 PM    I was present during the entire treatment, directing and participating in the treatment.    Vicente Rodas DPT      Future Appointments   Date Time Provider Jenny Bro   8/5/2020  9:45 AM Angelica Grecia, PT [facility]   8/10/2020  3:00 PM Angelica Grecia, PT [facility]   8/12/2020  9:00 AM Angelica Grecia, PT [facility]   8/17/2020 12:45 PM Angelica Grecia, PT [facility]   8/19/2020  9:00 AM Angelica Grecia, PT [facility]   8/24/2020 12:45 PM Angelica Hayes, PT [facility]   8/26/2020  9:00 AM Angelica Hayes, PT [facility]   8/31/2020  8:15 AM Angelica Hayes, PT [facility]   8/31/2020  1:00 PM 86 Sue Gardner, JACQUELINE Coombs 67

## 2020-08-05 ENCOUNTER — HOSPITAL ENCOUNTER (OUTPATIENT)
Dept: PHYSICAL THERAPY | Age: 65
Discharge: HOME | End: 2020-08-05
Payer: MEDICARE

## 2020-08-05 PROCEDURE — 97110 THERAPEUTIC EXERCISES: CPT

## 2020-08-05 NOTE — PROGRESS NOTES
[facility]  [place] [facility]  [place]  [address] [place] [address]  [phone] fax    Continued Plan of Care/ Re-certification for Physical Therapy Services    Patient name: Dylan Lisa Start of Care: 2020   Referral source: Chriss Potter,* : 1955   Medical/Treatment Diagnosis: Pain in right shoulder [M25.511]  Complete rotator cuff tear or rupture of right shoulder, not specified as traumatic [M75.121]  Payor: [place] Giron / Plan: VA MEDICARE PART A & B / Product Type: Medicare /  Onset Date: 05/15/2020     Prior Hospitalization: see medical history Provider#: [id number]   Medications: Verified on Patient Summary List    Comorbidities: OA, Hepatitis A ()  Prior Level of Function: I with all ADLs and household tasks, driving   Visits from Start of Care: 19    Missed Visits: 2    The Plan of Care and following information is based on the patient's current status:  Goal: Patient will improve FOTO score by 44 points in order to demonstrate a significant improvement in function. Status at last note/certification: 43 (3/70/42)  Current Status: progressing, improved to 56 (20)    Goal: Patient will improve right shoulder AROM flexion/abd to 90 degrees in order to increase ease of ADLs.    Status at last note/certification: right shoulder AROM, supine, flex: 62 degrees abd: 60 degrees (20)  Current Status: not met, right shoulder AROM flex: 57 degrees and abd: 58 degrees (20)    Goal: Patient will improve behind back reaching to sacrum in order to increase ease of getting dressed. Status at last note/certification: not met, pt having difficulty with behind the back reaching (20)   Current Status: met, sacrum (20)    Goal: Patient will improve right shoulder MMT to 4-/5 in order to increase ease of household chores.   Status at last note/certification: not assessed  Current Status: progressing, right shoulder MMT flex/abd: 3+ within available range; IR/ER: 4- (8/5/20)    Key functional changes: improving right shoulder mobility and strength; behind the back reaching progressing    Problems/ barriers to goal attainment: none    Problem List: decrease ROM, decrease strength, decrease ADL/ functional abilitiies, decrease activity tolerance and decrease flexibility/ joint mobility    Treatment Plan: Therapeutic exercise, Therapeutic activities, Neuromuscular re-education, Physical agent/modality, Gait/balance training, Manual therapy, Patient education, Self Care training and Functional mobility training       Goals for this certification period to be accomplished in 4 weeks:  1. Patient will improve FOTO score by 44 points in order to demonstrate a significant improvement in function. 2. Patient will improve right shoulder AROM flexion/abd, seated, to 90 degrees in order to increase ease of ADLs. 3. Patient will improve behind the back reaching to L4 in order to increase ease of getting dressed. 4. Patient will improve right shoulder MMT to 4-/5 in order to increase ease of household chores. Frequency / Duration: Patient to be seen 2 times per week for 4 weeks:    Assessment / Recommendations: Pt has been steadily progressing towards her goal, meeting 1/4. She reports compliance with HEP and consistently expresses desire to increase right shoulder mobility and strength. Her right shoulder PROM is progressing towards normal limits. However, her right shoulder AROM is still limited. Right shoulder AROM, seated, flex: 57 degrees, abd: 58 degrees with significant shrugging. FOTO score had improved 37 points from 19 to 56. She has been consistently reporting 0-1/10 pain before and after treatments, and only reports mild soreness associated with increased activity.  Right shoulder MMT flex/abd: 3+ within available range and IR/ER 4-. Skilled PT is medically necessary in order to improve right shoulder mobility and strength for increase ease of ADLs and improved quality of life. Certification Period: 8/5/20-9/4/20    Phani Spivey 8/5/2020 9:52 AM  Vicente Rodas DPT    ________________________________________________________________________  I certify that the above Therapy Services are being furnished while the patient is under my care. I agree with the treatment plan and certify that this therapy is necessary. [] I have read the above and request that my patient continue as recommended.   [] I have read the above report and request that my patient continue therapy with the following changes/special instructions: _______________________________________  [] I have read the above report and request that my patient be discharged from therapy    Physician's Signature:____________Date:_________TIME:________    ** Signature, Date and Time must be completed for valid certification **    Please sign and return to [facility]  [facility]  [address] [facility]  [phone] fax

## 2020-08-05 NOTE — PROGRESS NOTES
PT DAILY TREATMENT NOTE 10-18    Patient Name: Fe Koroma  Date:2020  : 1955  [x]  Patient  Verified  Payor: VA MEDICARE / Plan: VA MEDICARE PART A & B / Product Type: Medicare /    In time: 9:49  Out time:10:33  Total Treatment Time (min): 44  Visit #: 8 of 8    Medicare/BCBS Only   Total Timed Codes (min):  44 1:1 Treatment Time:  41       Treatment Area: Pain in right shoulder [M25.511]  Complete rotator cuff tear or rupture of right shoulder, not specified as traumatic [M75.121]    SUBJECTIVE  Pain Level (0-10 scale): 0/10  Any medication changes, allergies to medications, adverse drug reactions, diagnosis change, or new procedure performed?: [x] No    [] Yes (see summary sheet for update)  Subjective functional status/changes:   [] No changes reported  Pt stated that she has been doing well since her last session. No complaints of soreness or discomfort. OBJECTIVE    44 min Therapeutic Exercise:  [x] See flow sheet :   Rationale: increase ROM and increase strength to improve the patients ability to increase ease of ADLs     With   [x] TE   [] TA   [] neuro   [] other: Patient Education: [x] Review HEP    [] Progressed/Changed HEP based on:   [] positioning   [] body mechanics   [] transfers   [] heat/ice application    [] other:      Other Objective/Functional Measures: FOTO: 56  Right shoulder AROM flex: 57 abd: 58  Behind the back reaching: Sacrum  Right shoulder MMT for flex/abd: 3+ within available range; IR/ER: 4    Pain Level (0-10 scale) post treatment: 0/10    ASSESSMENT/Changes in Function:   [x]  See recertification         Progress towards goals / Updated goals:  See recertification     PLAN  []  Upgrade activities as tolerated     [x]  Continue plan of care  []  Update interventions per flow sheet       []  Discharge due to:_  []  Other:_      Chelsey Goldstein 2020  9:51 AM    I was present during the entire treatment, directing and participating in the treatment. Waleska Phelps DPT      Future Appointments   Date Time Provider Jenny Adali   8/10/2020  3:00 PM Robin Maya, [facility]   8/12/2020  9:00 AM Phylliashley Cohen, PTA [facility]   8/17/2020 12:45 PM Robin Maya, PT [facility]   8/19/2020  9:00 AM Robin Maya, PT [facility]   8/24/2020 12:45 PM Robin Maya, PT [facility]   8/26/2020  9:00 AM Robin Maya, PT [facility]   8/31/2020  8:15 AM Robin Maya, PT [facility]   8/31/2020  1:00 PM Tu Gardner, Sudhakar Hubbard, JACQUELINE Salter 67

## 2020-08-10 ENCOUNTER — HOSPITAL ENCOUNTER (OUTPATIENT)
Dept: PHYSICAL THERAPY | Age: 65
Discharge: HOME | End: 2020-08-10
Payer: MEDICARE

## 2020-08-10 PROCEDURE — 97110 THERAPEUTIC EXERCISES: CPT

## 2020-08-10 NOTE — PROGRESS NOTES
PT DAILY TREATMENT NOTE 10-18    Patient Name: Christian Wilkins  Date:8/10/2020  : 1955  [x]  Patient  Verified  Payor: VA MEDICARE / Plan: VA MEDICARE PART A & B / Product Type: Medicare /    In time: 2:58  Out time: 3:42  Total Treatment Time (min): 44  Visit #: 1 of 8    Medicare/BCBS Only   Total Timed Codes (min):  44 1:1 Treatment Time:  44       Treatment Area: Pain in right shoulder [M25.511]  Complete rotator cuff tear or rupture of right shoulder, not specified as traumatic [M75.121]    SUBJECTIVE  Pain Level (0-10 scale): 0/10  Any medication changes, allergies to medications, adverse drug reactions, diagnosis change, or new procedure performed?: [x] No    [] Yes (see summary sheet for update)  Subjective functional status/changes:   [] No changes reported  Pt stated that she has been well with her HEP and denies any pain or discomfort other than \"the usual soreness. \"    OBJECTIVE    44 min Therapeutic Exercise:  [] See flow sheet :   Rationale: increase ROM and increase strength to improve the patients ability to increase ease of ADLs         With   [x] TE   [] TA   [] neuro   [] other: Patient Education: [x] Review HEP    [x] Progressed/Changed HEP based on:   [x] positioning   [x] body mechanics   [] transfers   [] heat/ice application    [] other:      Other Objective/Functional Measures:   Had difficulty performing with Perkins ER 0.1#   Completed Perkins IR exercises 0.5# with excessive trunk rotation, required consistent cuing to maintain neutral spine  Tolerated stick AAROM exercises elevated at 45 degrees  Reached Lv 25 three consecutive times on the ladder  Educated on the use of OTB to complete rows, shoulder extension, and shoulder IR/ER exercises at home      Pain Level (0-10 scale) post treatment: 0/10    ASSESSMENT/Changes in Function: Pt is steadily progressing towards her goals. AROM of the right shoulder continues to be limited.  However, pt demonstrates some improvements in strength of the right shoulder. Pt continues to report increased ease of ADLs and continues to maintain compliance with HEP. Patient will continue to benefit from skilled PT services to modify and progress therapeutic interventions, address functional mobility deficits, address ROM deficits, address strength deficits, analyze and address soft tissue restrictions, analyze and cue movement patterns and analyze and modify body mechanics/ergonomics to attain remaining goals. Progress towards goals / Updated goals:    1. Patient will improve FOTO score by 44 points in order to demonstrate a significant improvement in function. 2. Patient will improve right shoulder AROM flexion/abd, seated, to 90 degrees in order to increase ease of ADLs. 3. Patient will improve behind the back reaching to L4 in order to increase ease of getting dressed. 4. Patient will improve right shoulder MMT to 4-/5 in order to increase ease of household chores. PLAN  []  Upgrade activities as tolerated     [x]  Continue plan of care  []  Update interventions per flow sheet       []  Discharge due to:_  []  Other:_      Caryle Hun 8/10/2020  3:02 PM  I was present during the entire treatment, directing and participating in the treatment.    Marnie Woodson DPT      Future Appointments   Date Time Provider Jenny Bro   8/12/2020  9:00 AM [place], [place] [facility]   8/17/2020 12:45 PM Loly Blair, PT [facility]   8/19/2020  9:00 AM Loly Blair PT [facility]   8/24/2020 12:45 PM Loly Blair PT [facility]   8/26/2020  9:00 AM Loly Blair PT [facility]   8/31/2020  8:15 AM Loly Blair, PT [facility]   8/31/2020  1:00 PM Thomas Shanks PA [address]

## 2020-08-12 ENCOUNTER — HOSPITAL ENCOUNTER (OUTPATIENT)
Dept: PHYSICAL THERAPY | Age: 65
Discharge: HOME | End: 2020-08-12
Payer: MEDICARE

## 2020-08-12 PROCEDURE — 97110 THERAPEUTIC EXERCISES: CPT

## 2020-08-12 NOTE — PROGRESS NOTES
PT DAILY TREATMENT NOTE 10-18    Patient Name: Diego Pike  Date:2020  : 1955  [x]  Patient  Verified  Payor: VA MEDICARE / Plan: VA MEDICARE PART A & B / Product Type: Medicare /    In time:900  Out time:938  Total Treatment Time (min): 38  Visit #: 2 of 8    Medicare/BCBS Only   Total Timed Codes (min):  38 1:1 Treatment Time:  38       Treatment Area: Pain in right shoulder [M25.511]  Complete rotator cuff tear or rupture of right shoulder, not specified as traumatic [M75.121]    SUBJECTIVE  Pain Level (0-10 scale): 0/10  Any medication changes, allergies to medications, adverse drug reactions, diagnosis change, or new procedure performed?: [x] No    [] Yes (see summary sheet for update)  Subjective functional status/changes:   [] No changes reported  Pt stated that she is doing well and has no pain    OBJECTIVE    38 min Therapeutic Exercise:  [x] See flow sheet :   Rationale: increase ROM and increase strength to improve the patients ability to increase ease with ADLs    With   [x] TE   [] TA   [] neuro   [] other: Patient Education: [x] Review HEP    [] Progressed/Changed HEP based on:   [] positioning   [] body mechanics   [] transfers   [] heat/ice application    [] other:      Other Objective/Functional Measures:   Cont to use UT compensation with reaching forward and overhead  No complaint of increased pain during session  Tolerated stabs well without complaint of increased pain, but did need left UE to lower right arm into resting position  Had fair range in flex with supine wand     Pain Level (0-10 scale) post treatment: 0/10    ASSESSMENT/Changes in Function:   Pt is slowly progressing toward goals. Pt cont with moderate UT compensation with reaching activities. Cont with decreased strength in the right sh for all motions. AAROM with mat table @ 45* is improving.     Patient will continue to benefit from skilled PT services to modify and progress therapeutic interventions, address functional mobility deficits, address ROM deficits, address strength deficits, analyze and cue movement patterns, assess and modify postural abnormalities and instruct in home and community integration to attain remaining goals. []  See Plan of Care  [x]  See progress note/recertification  []  See Discharge Summary         Progress towards goals / Updated goals:  1. Patient will improve FOTO score by 44 points in order to demonstrate a significant improvement in function. 2. Patient will improve right shoulder AROM flexion/abd, seated, to 90 degrees in order to increase ease of ADLs. 3. Patient will improve behind the back reaching to L4 in order to increase ease of getting dressed. 4. Patient will improve right shoulder MMT to 4-/5 in order to increase ease of household chores.     PLAN  []  Upgrade activities as tolerated     [x]  Continue plan of care  []  Update interventions per flow sheet       []  Discharge due to:_  []  Other:_      Vida Hernández PTA 8/12/2020  6:54 AM    Future Appointments   Date Time Provider Jenny Bro   8/12/2020  9:00 AM Yasmeen Tiwari PTA [facility]   8/17/2020 12:45 PM Sue Remy, PT [facility]   8/19/2020  9:00 AM Sue Remy, PT [facility]   8/24/2020 12:45 PM Sue Remy, PT [facility]   8/26/2020  9:00 AM Sue Remy, PT [facility]   8/31/2020  8:15 AM Sue Remy, [facility]   8/31/2020  1:00 PM Tu Gardner, JACQUELINE Al 67

## 2020-08-17 ENCOUNTER — HOSPITAL ENCOUNTER (OUTPATIENT)
Dept: PHYSICAL THERAPY | Age: 65
Discharge: HOME | End: 2020-08-17
Payer: MEDICARE

## 2020-08-17 PROCEDURE — 97110 THERAPEUTIC EXERCISES: CPT

## 2020-08-17 PROCEDURE — 97140 MANUAL THERAPY 1/> REGIONS: CPT

## 2020-08-17 NOTE — PROGRESS NOTES
PT DAILY TREATMENT NOTE 10-18    Patient Name: Zoey Gordon  Date:2020  : 1955  [x]  Patient  Verified  Payor: VA MEDICARE / Plan: VA MEDICARE PART A & B / Product Type: Medicare /    In time: 12:45  Out time: 1:26  Total Treatment Time (min): 41  Visit #: 3 of 8    Medicare/BCBS Only   Total Timed Codes (min):  41 1:1 Treatment Time:  38       Treatment Area: Pain in right shoulder [M25.511]  Complete rotator cuff tear or rupture of right shoulder, not specified as traumatic [M75.121]    SUBJECTIVE  Pain Level (0-10 scale): 1/10  Any medication changes, allergies to medications, adverse drug reactions, diagnosis change, or new procedure performed?: [x] No    [] Yes (see summary sheet for update)  Subjective functional status/changes:   [] No changes reported  Pt. Reports her shoulder is sore today from sleeping on it. OBJECTIVE    33 min Therapeutic Exercise:  [x] See flow sheet :   Rationale: increase ROM and increase strength to improve the patients ability to increase ease of ADLs    8 min Manual Therapy:  STM and trigger point release to infraspinatus, scap mobs   Rationale: decrease pain, increase ROM and increase tissue extensibility to increase ease of reaching          With   [x] TE   [] TA   [] neuro   [] other: Patient Education: [x] Review HEP    [] Progressed/Changed HEP based on:   [] positioning   [] body mechanics   [] transfers   [] heat/ice application    [] other:      Other Objective/Functional Measures:   Pt. Had pain and more difficulty with stick abduction and side lying abduction today  She continues to have significant decrease with shoulder AROM  She has decreased mobility with behind back reaching     Pain Level (0-10 scale) post treatment:  1/10    ASSESSMENT/Changes in Function:  Pt. Is progressing slowly towards goals. She continues to have decreased right shoulder mobility and strength.  She continues to have difficulty with reaching to her head and into cabinets at home. Patient will continue to benefit from skilled PT services to modify and progress therapeutic interventions, address functional mobility deficits, address ROM deficits, address strength deficits, analyze and address soft tissue restrictions and analyze and cue movement patterns to attain remaining goals. Progress towards goals / Updated goals:  1. Patient will improve FOTO score by 44 points in order to demonstrate a significant improvement in function.    2. Patient will improve right shoulder AROM flexion/abd, seated, to 90 degrees in order to increase ease of ADLs. Not met (8/17/20)  3. Patient will improve behind the back reaching to L4 in order to increase ease of getting dressed. 4. Patient will improve right shoulder MMT to 4-/5 in order to increase ease of household chores.     PLAN  []  Upgrade activities as tolerated     [x]  Continue plan of care  []  Update interventions per flow sheet       []  Discharge due to:_  []  Other:_      Yunior Victor, PT 8/17/2020  8:02 AM    Future Appointments   Date Time Provider Jenny Bro   8/17/2020 12:45 PM Bonnielee Render, PT [facility]   8/19/2020  9:00 AM Ravin Mcpherson, PTA [facility]   8/24/2020 12:45 PM Bonnielee Render, PT [facility]   8/26/2020  9:00 AM Bonnielee Render, PT [facility]   8/31/2020  8:15 AM Bonnielee Render, PT [facility]   8/31/2020  1:00 PM Eddie Shanks PA [address]

## 2020-08-19 ENCOUNTER — TELEPHONE (OUTPATIENT)
Dept: ORTHOPEDIC SURGERY | Age: 65
End: 2020-08-19

## 2020-08-19 ENCOUNTER — HOSPITAL ENCOUNTER (OUTPATIENT)
Dept: PHYSICAL THERAPY | Age: 65
Discharge: HOME | End: 2020-08-19
Payer: MEDICARE

## 2020-08-19 PROCEDURE — 97110 THERAPEUTIC EXERCISES: CPT

## 2020-08-19 PROCEDURE — 97530 THERAPEUTIC ACTIVITIES: CPT

## 2020-08-19 NOTE — TELEPHONE ENCOUNTER
Patient contacted and given these instructions. She will call tomorrow to schedule a sooner appointment.

## 2020-08-19 NOTE — TELEPHONE ENCOUNTER
I would hold off on PT for a couple days to see if the pain settles down. She can take the pain medication if she needs it. She can also wear her sling for a couple days to see if that helps her pain. Have her make a follow up appt with Dr. Roro Friedman or [facility] [place].

## 2020-08-19 NOTE — TELEPHONE ENCOUNTER
Patient states she has not been able to lift her arm since Monday. She has been taking constant Tylenol. She was unable to perform in physical therapy today. She states her pain is excruciating. She is requesting to be fit in as soon as possible    Also, she has remaining pain medicine from her surgery and is asking if she should take that.   Please advise at [phone]

## 2020-08-19 NOTE — PROGRESS NOTES
PT DAILY TREATMENT NOTE 10-18    Patient Name: Jeff Kidney  Date:2020  : 1955  [x]  Patient  Verified  Payor: [place] MEDICARE / Plan: [place] MEDICARE PART A & B / Product Type: Medicare /    In time:900  Out time:935  Total Treatment Time (min): 35  Visit #: 4 of 8    Medicare/BCBS Only   Total Timed Codes (min):  25 1:1 Treatment Time:  25       Treatment Area: Pain in right shoulder [M25.511]  Complete rotator cuff tear or rupture of right shoulder, not specified as traumatic [M75.121]    SUBJECTIVE  Pain Level (0-10 scale): 2  Any medication changes, allergies to medications, adverse drug reactions, diagnosis change, or new procedure performed?: [x] No    [] Yes (see summary sheet for update)  Subjective functional status/changes:   [] No changes reported  Patient reported increased pain right shoulder upon arrival    Modality: to decrease pain and inflammation to increase ability to perform ADLs. Ice to right shoulder in sitting x 10 mins. OBJECTIVE    Modality: to decrease pain and inflammation to increase ability to perform ADLs. Ice to right shoulder in sitting x 10 mins.     15 min Therapeutic Exercise:  [x] See flow sheet :   Rationale: increase ROM and increase strength to improve the patients ability to perform ADLs    10 min Therapeutic Activity:  [x]  See flow sheet : reaching training   Rationale: increase strength and improve coordination  to improve the patients ability to perform ADLs               With   [] TE   [] TA   [] neuro   [] other: Patient Education: [x] Review HEP    [] Progressed/Changed HEP based on:   [] positioning   [] body mechanics   [] transfers   [] heat/ice application    [] other:      Other Objective/Functional Measures: patient limited by increased pain anterior aspect of right shoulder and along joint line right shoulder    IR functional react S1  Pain Level (0-10 scale) post treatment: 2    ASSESSMENT/Changes in Function: Patient has suddenly lost abd and AROM into flexion strength as well as increased pain with movement and at rest along joint line right shoulder. Patient referred back to MD today to address increased pain and loss of function. Patient will continue to benefit from skilled PT services to modify and progress therapeutic interventions, address functional mobility deficits, address ROM deficits, address strength deficits, analyze and address soft tissue restrictions and analyze and cue movement patterns to attain remaining goals. [x]  See Plan of Care  []  See progress note/recertification  []  See Discharge Summary         Progress towards goals / Updated goals:  1. Patient will improve FOTO score by 44 points in order to demonstrate a significant improvement in function.    2. Patient will improve right shoulder AROM flexion/abd, seated, to 90 degrees in order to increase ease of ADLs. Not met (8/17/20)  3. Patient will improve behind the back reaching to L4 in order to increase ease of getting dressed. Not met S1 (8/19/20)    4.  Patient will improve right shoulder MMT to 4-/5 in order to increase ease of household chores.       PLAN  []  Upgrade activities as tolerated     [x]  Continue plan of care  []  Update interventions per flow sheet       []  Discharge due to:_  []  Other:_      Thad Sacks, NIRAV 8/19/2020  9:01 AM    Future Appointments   Date Time Provider Jenny Bro   8/24/2020 12:45 PM Geri Verduzco, PT [facility]   8/26/2020  9:00 AM Brandin Courser [facility]   8/31/2020  8:15 AM Brandin Courser [facility]   8/31/2020  1:00 PM Fawad Shanks, [id number] [facility]

## 2020-08-20 ENCOUNTER — OFFICE VISIT (OUTPATIENT)
Dept: ORTHOPEDIC SURGERY | Age: 65
End: 2020-08-20

## 2020-08-20 VITALS
OXYGEN SATURATION: 97 % | BODY MASS INDEX: 21.49 KG/M2 | RESPIRATION RATE: 16 BRPM | TEMPERATURE: 98.7 F | HEIGHT: 65 IN | SYSTOLIC BLOOD PRESSURE: 130 MMHG | DIASTOLIC BLOOD PRESSURE: 69 MMHG | HEART RATE: 82 BPM | WEIGHT: 129 LBS

## 2020-08-20 DIAGNOSIS — M25.511 ACUTE PAIN OF RIGHT SHOULDER: Primary | ICD-10-CM

## 2020-08-20 DIAGNOSIS — M75.101 ROTATOR CUFF TEAR ARTHROPATHY OF RIGHT SHOULDER: ICD-10-CM

## 2020-08-20 DIAGNOSIS — M12.811 ROTATOR CUFF TEAR ARTHROPATHY OF RIGHT SHOULDER: ICD-10-CM

## 2020-08-20 NOTE — TELEPHONE ENCOUNTER
Spoke with JACQUELINE Galdamez, she states ok for patient to be seen in office today.  Message printed and given to the [facility]

## 2020-08-20 NOTE — PROGRESS NOTES
Darold Cogan  1955     HISTORY OF PRESENT ILLNESS  Darold Cogan is a 77 y.o. female who presents today for evaluation s/p Right reverse total shoulder arthroplasty on 5/15/2020. Patient has been going to PT. Pain is a 7/10. She notes on Monday she woke up sore. Pain has worsened since. Unable to do much in PT yesterday. Had to take a pain pill pain was so severe. Cannot raise her arm on her own. Patient denies any fever, chills, chest pain, shortness of breath or calf pain. The remainder of the review of systems is negative. There are no new illness or injuries to report since last seen in the office. No changes in medications, allergies, social or family history. Pain Assessment  8/20/2020   Location of Pain Shoulder   Location Modifiers Right   Severity of Pain 7   Quality of Pain Aching   Quality of Pain Comment -   Duration of Pain Persistent   Frequency of Pain Constant   Aggravating Factors (No Data)   Aggravating Factors Comment just always there   Limiting Behavior Yes   Relieving Factors Ice   Relieving Factors Comment -   Result of Injury No           PHYSICAL EXAM:   Visit Vitals  /69 (BP 1 Location: Left arm)   Pulse 82   Temp 98.7 °F (37.1 °C)   Resp 16   Ht 5' 4.5\" (1.638 m)   Wt 129 lb (58.5 kg)   SpO2 97%   BMI 21.80 kg/m²      The patient is a well-developed, well-nourished female in no acute distress. The patient is alert and oriented times three. The patient appears to be well groomed. Mood and affect are normal.  LYMPHATIC: lymph nodes are not enlarged and are within normal limits  SKIN: normal in color and non tender to palpation. There are no bruises or abrasions noted. NEUROLOGICAL: Motor sensory exam is within normal limits. Reflexes are equal bilaterally.  There is normal sensation to pinprick and light touch     ORTHOPEDIC EXAM of right shoulder:  Inspection: swelling not present,  Bruising not present  Incision well healed  Passive glenohumeral abduction 0-60 degrees, Stability: Stable  Strength: 4/5  2+ distal pulses    IMAGING: 3 view xray images of right taken in office on 8/20/2020 read and reviewed by myself reveal reverse total shoulder hardware in excellent position     IMPRESSION:  S/P Right reverse total shoulder arthroplasty  Encounter Diagnoses   Name Primary?  Acute pain of right shoulder Yes    Rotator cuff tear arthropathy of right shoulder         PLAN:   1. Patient with acute increase in pain. Will hold on PT. Focus on PROM only. Risk factors include: smoker  2. No cortisone injection indicated today   3. No Physical/Occupational Therapy indicated today  4. No diagnostic test indicated today:   5. No durable medical equipment indicated today  6. No referral indicated today   7. No medications indicated today:   8.  No Narcotic indicated today         RTC 2 weeks     JUNIOR Terrell [facility] and Spine Specialist

## 2020-08-20 NOTE — TELEPHONE ENCOUNTER
Patient called to try and be seen today for her shoulder. I did advise her that the next available is not until Tuesday with Kiki or Dr. Seng Gtz. She stated she could not wait that long because she is unable to raise her arm.  Please advise patient at [phone]

## 2020-08-24 ENCOUNTER — APPOINTMENT (OUTPATIENT)
Dept: PHYSICAL THERAPY | Age: 65
End: 2020-08-24
Payer: MEDICARE

## 2020-08-26 ENCOUNTER — APPOINTMENT (OUTPATIENT)
Dept: PHYSICAL THERAPY | Age: 65
End: 2020-08-26
Payer: MEDICARE

## 2020-08-31 ENCOUNTER — OFFICE VISIT (OUTPATIENT)
Dept: ORTHOPEDIC SURGERY | Age: 65
End: 2020-08-31

## 2020-08-31 ENCOUNTER — DOCUMENTATION ONLY (OUTPATIENT)
Dept: ORTHOPEDIC SURGERY | Age: 65
End: 2020-08-31

## 2020-08-31 ENCOUNTER — APPOINTMENT (OUTPATIENT)
Dept: PHYSICAL THERAPY | Age: 65
End: 2020-08-31
Payer: MEDICARE

## 2020-08-31 VITALS
WEIGHT: 129 LBS | HEART RATE: 86 BPM | SYSTOLIC BLOOD PRESSURE: 120 MMHG | OXYGEN SATURATION: 98 % | TEMPERATURE: 98 F | HEIGHT: 65 IN | DIASTOLIC BLOOD PRESSURE: 60 MMHG | BODY MASS INDEX: 21.49 KG/M2 | RESPIRATION RATE: 16 BRPM

## 2020-08-31 DIAGNOSIS — M12.811 ROTATOR CUFF TEAR ARTHROPATHY OF RIGHT SHOULDER: Primary | ICD-10-CM

## 2020-08-31 DIAGNOSIS — M75.101 ROTATOR CUFF TEAR ARTHROPATHY OF RIGHT SHOULDER: Primary | ICD-10-CM

## 2020-08-31 RX ORDER — OXYCODONE HYDROCHLORIDE 5 MG/1
5 TABLET ORAL
Qty: 40 TAB | Refills: 0 | Status: SHIPPED | OUTPATIENT
Start: 2020-08-31 | End: 2020-09-07

## 2020-08-31 NOTE — PROGRESS NOTES
Pieter Romero  1955     HISTORY OF PRESENT ILLNESS  Pieter Romero is a 77 y.o. female who presents today for evaluation s/p Right reverse total shoulder arthroplasty on 5/15/2020. Patient has been going to PT. Pain is a 7/10. She notes 3 weeks ago she woke up sore. Pain continue to worsen. Cannot raise her arm on her own. Feels new \"bump' on arm now. Patient denies any fever, chills, chest pain, shortness of breath or calf pain. The remainder of the review of systems is negative. There are no new illness or injuries to report since last seen in the office. No changes in medications, allergies, social or family history. Pain Assessment  8/20/2020   Location of Pain Shoulder   Location Modifiers Right   Severity of Pain 7   Quality of Pain Aching   Quality of Pain Comment -   Duration of Pain Persistent   Frequency of Pain Constant   Aggravating Factors (No Data)   Aggravating Factors Comment just always there   Limiting Behavior Yes   Relieving Factors Ice   Relieving Factors Comment -   Result of Injury No           PHYSICAL EXAM:   There were no vitals taken for this visit. The patient is a well-developed, well-nourished female in no acute distress. The patient is alert and oriented times three. The patient appears to be well groomed. Mood and affect are normal.  LYMPHATIC: lymph nodes are not enlarged and are within normal limits  SKIN: normal in color and non tender to palpation. There are no bruises or abrasions noted. NEUROLOGICAL: Motor sensory exam is within normal limits. Reflexes are equal bilaterally.  There is normal sensation to pinprick and light touch     ORTHOPEDIC EXAM of right shoulder:  Inspection: swelling not present,  Bruising not present  Incision well healed  Passive glenohumeral abduction 0-30 degrees,  Strength: 3/5  2+ distal pulses    IMAGING: 3 view xray images of right taken in office on 8/31/2020 read and reviewed by myself reveal reverse total shoulder hardware in excellent position     IMPRESSION:  S/P Right reverse total shoulder arthroplasty  Encounter Diagnosis   Name Primary?  Rotator cuff tear arthropathy of right shoulder Yes        PLAN:   1. Patient with acute increase in pain. Will order STAT CT scan with metal reducing software r/o loosening vs subscap tear  Risk factors include: smoker  2. No cortisone injection indicated today   3. No Physical/Occupational Therapy indicated today  4. YES diagnostic test indicated today:   5. No durable medical equipment indicated today  6. No referral indicated today   7. No medications indicated today:   8.  No Narcotic indicated today         RTC after CT scan with Linda Molina [facility] and Spine Specialist

## 2020-08-31 NOTE — PROGRESS NOTES
I poke with Milind Morrissey @ [facility] does not do this type of CT but stated that the tech told her [facility]ily may. I spoke with Sofia at the [employer] with Mily she is waiting on a call back from radiology manager to see if it is approved that they do this at the [facility]'s. Once she is contacted back they will contact me at my direct extension.

## 2020-08-31 NOTE — PROGRESS NOTES
Called patient and spoke to her, she verbally stated that she understood that she is scheduled for a STAT CT tomorrow 09/01/2020 @ [facility], must arrive at 12:45pm for 1:15pm appt; must bring Id, ins card, list of meds and mask [facility] [address]    Patients order, demographics and office note were faxed to STAT line for [facility] @ [phone]. Patient was driving so I did call her back and LVM with the information.

## 2020-09-02 ENCOUNTER — OFFICE VISIT (OUTPATIENT)
Dept: ORTHOPEDIC SURGERY | Age: 65
End: 2020-09-02

## 2020-09-02 VITALS
TEMPERATURE: 98.2 F | SYSTOLIC BLOOD PRESSURE: 116 MMHG | DIASTOLIC BLOOD PRESSURE: 70 MMHG | HEIGHT: 65 IN | RESPIRATION RATE: 15 BRPM | WEIGHT: 129.8 LBS | OXYGEN SATURATION: 96 % | BODY MASS INDEX: 21.63 KG/M2 | HEART RATE: 85 BPM

## 2020-09-02 DIAGNOSIS — M75.101 ROTATOR CUFF TEAR ARTHROPATHY OF RIGHT SHOULDER: Primary | ICD-10-CM

## 2020-09-02 DIAGNOSIS — M12.811 ROTATOR CUFF TEAR ARTHROPATHY OF RIGHT SHOULDER: Primary | ICD-10-CM

## 2020-09-02 DIAGNOSIS — M25.511 RIGHT SHOULDER PAIN, UNSPECIFIED CHRONICITY: ICD-10-CM

## 2020-09-02 NOTE — PROGRESS NOTES
Sarahy Edmondson  1955     HISTORY OF PRESENT ILLNESS  Sarahy Edmondson is a 77 y.o. female who presents today for evaluation s/p Right reverse total shoulder arthroplasty on 5/15/2020. Patient has been going to PT. Pain is a 7/10. She is here today for a CT review. She notes 3 weeks ago she woke up sore. Pain continues to worsen. Cannot raise her arm on her own. Feels new \"bump' on arm now. Still in a lot of pain today. She notes swelling today. Patient denies any fever, chills, chest pain, shortness of breath or calf pain. The remainder of the review of systems is negative. There are no new illness or injuries to report since last seen in the office. No changes in medications, allergies, social or family history. Pain Assessment  9/2/2020   Location of Pain Shoulder   Location Modifiers Right   Severity of Pain 7   Quality of Pain Throbbing; Sharp;Dull;Aching   Quality of Pain Comment -   Duration of Pain Persistent   Frequency of Pain Constant   Aggravating Factors Bending;Stretching;Straightening   Aggravating Factors Comment -   Limiting Behavior -   Relieving Factors NSAID   Relieving Factors Comment -   Result of Injury No           PHYSICAL EXAM:   Visit Vitals  /70 (BP 1 Location: Left arm, BP Patient Position: Sitting)   Pulse 85   Temp 98.2 °F (36.8 °C) (Oral)   Resp 15   Ht 5' 4.5\" (1.638 m)   Wt 129 lb 12.8 oz (58.9 kg)   SpO2 96%   BMI 21.94 kg/m²      The patient is a well-developed, well-nourished female in no acute distress. The patient is alert and oriented times three. The patient appears to be well groomed. Mood and affect are normal.  LYMPHATIC: lymph nodes are not enlarged and are within normal limits  SKIN: normal in color and non tender to palpation. There are no bruises or abrasions noted. NEUROLOGICAL: Motor sensory exam is within normal limits. Reflexes are equal bilaterally.  There is normal sensation to pinprick and light touch  ORTHOPEDIC EXAM of right shoulder:  Inspection: swelling not present,  Bruising not present  Incision well healed  Passive glenohumeral abduction 0-30 degrees,  Strength: 3/5  2+ distal pulses    IMAGING:  XR of right shoulder obtained in the office dated 9/02/2020 was reviewed and read by Dr. Charlie Mcgrath: no change in position      [facility] was reviewed and read by Dr. Charlie Mcgrath:   IMPRESSION:  1. Limited study due to artifacts  2. Soft tissue density still seen in the subacromial space, no full-thickness rotator cuff tendon rupture suspected. No severe tendon retraction seen. However there is severe atrophy of supraspinatus and subscapularis muscular bellies. No glenohumeral joint effusion. AC joint hypertrophy. 3 view xray images of right taken in office on 8/31/2020 read and reviewed by myself reveal reverse total shoulder hardware in excellent position         IMPRESSION:  S/P Right reverse total shoulder arthroplasty  Encounter Diagnoses   Name Primary?  Right shoulder pain, unspecified chronicity     Rotator cuff tear arthropathy of right shoulder Yes        PLAN:   1. Patient with acute increase in pain and the CT does not reveal any significant clinical findings. Will resume with PT, PROM only. Will be ordering CBC w/ Diff, SED rate, and  CRP to r/o infectious pathology. Depending on the lab results, may consider ordering an EMG study. Will see her back after she gets the lab work done. Risk factors include: smoker  2. No cortisone injection indicated today   3. Yes Physical/Occupational Therapy indicated today  4. No diagnostic test indicated today:   5. No durable medical equipment indicated today  6. No referral indicated today   7. No medications indicated today:   8.  No Narcotic indicated today         RTC following labs    Scribed by Gloria Erickson [address]) as dictated by Jorge Bucio MD    I, Dr. Jorge Bucio, confirm that all documentation is accurate.     Hola Suggs M.D.   [facility] Spine Specialist

## 2020-09-03 ENCOUNTER — HOSPITAL ENCOUNTER (OUTPATIENT)
Dept: PHYSICAL THERAPY | Age: 65
Discharge: HOME | End: 2020-09-03
Payer: MEDICARE

## 2020-09-03 PROCEDURE — 97110 THERAPEUTIC EXERCISES: CPT

## 2020-09-03 PROCEDURE — 97014 ELECTRIC STIMULATION THERAPY: CPT

## 2020-09-03 PROCEDURE — 97140 MANUAL THERAPY 1/> REGIONS: CPT

## 2020-09-03 NOTE — PROGRESS NOTES
PT DAILY TREATMENT NOTE 10-18    Patient Name: Patrick Ask  Date:9/3/2020  : 1955  [x]  Patient  Verified  Payor: [place] MEDICARE / Plan: [place] MEDICARE PART A & B / Product Type: Medicare /    In time: 3:00  Out time: 3:47  Total Treatment Time (min): 47  Visit #: 5 of 8    Medicare/BCBS Only   Total Timed Codes (min):  32 1:1 Treatment Time:  32       Treatment Area: Pain in right shoulder [M25.511]  Complete rotator cuff tear or rupture of right shoulder, not specified as traumatic [M75.121]    SUBJECTIVE  Pain Level (0-10 scale): 6/10  Any medication changes, allergies to medications, adverse drug reactions, diagnosis change, or new procedure performed?: [x] No    [] Yes (see summary sheet for update)  Subjective functional status/changes:   [] No changes reported  Pt. Reports she has been more pain lately. She reports CT scan was negative and she is going to get blood work done and possibly EMG. Updated script is for PROM only secondary to her increased pain.      OBJECTIVE    Modality rationale: decrease edema and decrease pain to improve the patients ability to increase ease of ADLs   Min Type Additional Details   15 [x] Estim:  [x]Unatt       []IFC  [x]Premod                        []Other:  [x]w/ice game ready    []w/heat  Position: seated  Location: right shoulder    [] Estim: []Att    []TENS instruct  []NMES                    []Other:  []w/US   []w/ice   []w/heat  Position:  Location:    []  Traction: [] Cervical       []Lumbar                       [] Prone          []Supine                       []Intermittent   []Continuous Lbs:  [] before manual  [] after manual    []  Ultrasound: []Continuous   [] Pulsed                           []1MHz   []3MHz W/cm2:  Location:    []  Iontophoresis with dexamethasone         Location: [] Take home patch   [] In clinic    []  Ice     []  heat  []  Ice massage  []  Laser   []  Anodyne Position:  Location:    []  Laser with stim  []  Other: Position:  Location:    []  Vasopneumatic Device Pressure:       [] lo [] med [] hi   Temperature: [] lo [] med [] hi   [x] Skin assessment post-treatment:  [x]intact []redness- no adverse reaction    []redness  adverse reaction:     22 min Therapeutic Exercise:  [x] See flow sheet : PROM   Rationale: increase ROM to improve the patients ability to increase ease of ADLs    10 min Manual Therapy:  scap mobs, GH clocks, grade 2 AC mobs   Rationale: decrease pain, increase ROM and increase tissue extensibility to increase ease of reaching           With   [] TE   [] TA   [] neuro   [] other: Patient Education: [x] Review HEP    [] Progressed/Changed HEP based on:   [] positioning   [] body mechanics   [] transfers   [] heat/ice application    [] other:      Other Objective/Functional Measures:     Right shoulder PROM: flex: 30 degrees initially improved to 90 degrees following manual abd: 90 degrees ER at 45 degrees abd: 32 degrees IR at 45 degrees abd: 33 degrees  She was educated on only pendulums, scap squeeze, and elbow AROM for HEP     Pain Level (0-10 scale) post treatment: 0/10    ASSESSMENT/Changes in Function:    []  See Plan of Care  [x]  See progress note/recertification  []  See Discharge Summary         Progress towards goals / Updated goals:  See progress note    PLAN  []  Upgrade activities as tolerated     [x]  Continue plan of care  []  Update interventions per flow sheet       []  Discharge due to:_  []  Other:_      Ronny Cohen PT 9/3/2020  8:09 AM    Future Appointments   Date Time Provider Jenny Bro   9/3/2020  3:00 PM Trung Childress, PT [facility]   9/9/2020  1:40 PM MD Giovana Steele [address]   9/10/2020  9:00 AM Trung Childress, KIRBY [facility]   9/14/2020  9:00 AM Dorene Zurita PTA [facility]   9/17/2020  9:45 AM Trung Childress, PT [facility]   9/21/2020  9:45 AM Trung Childress PT [facility]   9/24/2020  9:00 AM Mcdonough, Blanca Watts [facility]   9/28/2020  9:00 AM Quincy Gale, PTA [facility]   10/1/2020  9:45 AM Mihaela Niño, PT [facility]

## 2020-09-03 NOTE — PROGRESS NOTES
[facility] [employer] [facility]  [place]  [address] [place] [address]  [phone] fax    Continued Plan of Care/ Re-certification for Physical Therapy Services    Patient name: Mahogany Payne Start of Care: 2020   Referral source: Heike Reddy,* : 1955   Medical/Treatment Diagnosis: Pain in right shoulder [M25.511]  Complete rotator cuff tear or rupture of right shoulder, not specified as traumatic [M75.121]  Payor: Bronwyn Ng / Plan: VA MEDICARE PART A & B / Product Type: Medicare /  Onset Date: 05/15/2020      Prior Hospitalization: see medical history Provider#: [id number]   Medications: Verified on Patient Summary List     Comorbidities: OA, Hepatitis A ()  Prior Level of Function: I with all ADLs and household tasks, driving          Visits from Start of Care: 24    Missed Visits: 2    The Plan of Care and following information is based on the patient's current status:  Goal: Patient will improve FOTO score by 44 points in order to demonstrate a significant improvement in function. Status at last note/certification:  56 points  Current Status: not met    Goal: Patient will improve right shoulder AROM flexion/abd, seated, to 90 degrees in order to increase ease of ADLs. Status at last note/certification: flex: 57 degrees abd: 58 degees  Current Status: not met    Goal: Patient will improve behind the back reaching to L4 in order to increase ease of getting dressed. Status at last note/certification: unable   Current Status: not met    Goal: Patient will improve right shoulder MMT to 4-/5 in order to increase ease of household chores.   Status at last note/certification: 3+/5  Current Status: not met    Key functional changes: pt. Continues to have decreased right shoulder mobility       Problems/ barriers to goal attainment: increased pain     Problem List: pain affecting function, decrease ROM, decrease strength, impaired gait/ balance, decrease ADL/ functional abilitiies, decrease activity tolerance and decrease flexibility/ joint mobility    Treatment Plan: Therapeutic exercise, Therapeutic activities, Neuromuscular re-education, Physical agent/modality, Gait/balance training, Manual therapy, Patient education, Self Care training, Functional mobility training and Home safety training     Patient Goal (s) has been updated and includes: to have less pain      Goals for this certification period to be accomplished in 4 weeks:  1. Patient will improve FOTO score by 44 points in order to demonstrate a significant improvement in function. 2. Patient will improve right shoulder flex/abd PROM to 120 degrees in order to increase ease of ADLs. 3. Patient will report pain at 2/10 or less during ADLs in order to improve quality of life. Frequency / Duration: Patient to be seen 2 times per week for 4 weeks:    Assessment / Recommendations: pt. Was progressing with physical therapy then reported waking up with increased pain on 8/17/20. She reported no significant change in activity the previous day. She also demonstrated increased difficulty with abduction mobility. She followed back up with physician and was placed on hold from PT for 2 weeks. She began PT again for PROM. Right shoulder PROM flex: 90 degrees, abd: 90 degrees, ER at 45 degrees abd: 32 degrees, IR at 45 degrees abd: 33 degrees. Skilled PT is medically necessary in order to improve right shoulder PROM and decrease pain in order to increase ease of ADLs and improve quality of life. Certification Period: 9/3/20-10/2/20    Dominique Deleon PT 9/3/2020 3:42 PM    ________________________________________________________________________  I certify that the above Therapy Services are being furnished while the patient is under my care. I agree with the treatment plan and certify that this therapy is necessary. [] I have read the above and request that my patient continue as recommended.   [] I have read the above report and request that my patient continue therapy with the following changes/special instructions: _______________________________________  [] I have read the above report and request that my patient be discharged from therapy    Physician's Signature:____________Date:_________TIME:________    ** Signature, Date and Time must be completed for valid certification **    Please sign and return to [facility] [employer] [facility] [place]  [address] [place] [address]  [phone] fax

## 2020-09-04 ENCOUNTER — HOSPITAL ENCOUNTER (OUTPATIENT)
Dept: LAB | Age: 65
Discharge: HOME | End: 2020-09-04
Payer: MEDICARE

## 2020-09-04 DIAGNOSIS — M75.101 ROTATOR CUFF TEAR ARTHROPATHY OF RIGHT SHOULDER: ICD-10-CM

## 2020-09-04 DIAGNOSIS — M12.811 ROTATOR CUFF TEAR ARTHROPATHY OF RIGHT SHOULDER: ICD-10-CM

## 2020-09-04 LAB
BASOPHILS # BLD: 0 K/UL (ref 0–0.1)
BASOPHILS NFR BLD: 1 % (ref 0–2)
CRP SERPL-MCNC: 0.6 MG/DL (ref 0–0.3)
DIFFERENTIAL METHOD BLD: ABNORMAL
EOSINOPHIL # BLD: 0.1 K/UL (ref 0–0.4)
EOSINOPHIL NFR BLD: 2 % (ref 0–5)
ERYTHROCYTE [DISTWIDTH] IN BLOOD BY AUTOMATED COUNT: 14.4 % (ref 11.6–14.5)
ERYTHROCYTE [SEDIMENTATION RATE] IN BLOOD: 8 MM/HR (ref 0–30)
HCT VFR BLD AUTO: 43.7 % (ref 35–45)
HGB BLD-MCNC: 14.2 G/DL (ref 12–16)
LYMPHOCYTES # BLD: 1.9 K/UL (ref 0.9–3.6)
LYMPHOCYTES NFR BLD: 23 % (ref 21–52)
MCH RBC QN AUTO: 26.6 PG (ref 24–34)
MCHC RBC AUTO-ENTMCNC: 32.5 G/DL (ref 31–37)
MCV RBC AUTO: 81.8 FL (ref 74–97)
MONOCYTES # BLD: 1.2 K/UL (ref 0.05–1.2)
MONOCYTES NFR BLD: 15 % (ref 3–10)
NEUTS SEG # BLD: 4.8 K/UL (ref 1.8–8)
NEUTS SEG NFR BLD: 59 % (ref 40–73)
PLATELET # BLD AUTO: 354 K/UL (ref 135–420)
PMV BLD AUTO: 9.8 FL (ref 9.2–11.8)
RBC # BLD AUTO: 5.34 M/UL (ref 4.2–5.3)
WBC # BLD AUTO: 8 K/UL (ref 4.6–13.2)

## 2020-09-04 PROCEDURE — 36415 COLL VENOUS BLD VENIPUNCTURE: CPT

## 2020-09-04 PROCEDURE — 85652 RBC SED RATE AUTOMATED: CPT

## 2020-09-04 PROCEDURE — 86140 C-REACTIVE PROTEIN: CPT

## 2020-09-04 PROCEDURE — 85025 COMPLETE CBC W/AUTO DIFF WBC: CPT

## 2020-09-09 ENCOUNTER — TELEPHONE (OUTPATIENT)
Dept: ORTHOPEDIC SURGERY | Age: 65
End: 2020-09-09

## 2020-09-09 ENCOUNTER — OFFICE VISIT (OUTPATIENT)
Dept: ORTHOPEDIC SURGERY | Age: 65
End: 2020-09-09

## 2020-09-09 VITALS
HEIGHT: 65 IN | DIASTOLIC BLOOD PRESSURE: 69 MMHG | TEMPERATURE: 98.4 F | HEART RATE: 75 BPM | WEIGHT: 130.6 LBS | BODY MASS INDEX: 21.76 KG/M2 | SYSTOLIC BLOOD PRESSURE: 128 MMHG

## 2020-09-09 DIAGNOSIS — M12.811 ROTATOR CUFF TEAR ARTHROPATHY OF RIGHT SHOULDER: Primary | ICD-10-CM

## 2020-09-09 DIAGNOSIS — M75.101 ROTATOR CUFF TEAR ARTHROPATHY OF RIGHT SHOULDER: Primary | ICD-10-CM

## 2020-09-09 NOTE — PROGRESS NOTES
Opal Patterson  1955     HISTORY OF PRESENT ILLNESS  Opal Patterson is a 77 y.o. female who presents today for evaluation s/p Right reverse total shoulder arthroplasty on 5/15/2020. Patient has been going to PT. Pain is a 1/10. She is here today to review lab work. She notes around 4 weeks ago she woke up sore. She states she is doing a lot better today, has noticed improvement since last week. She reports improved ROM but does note some soreness, feels like the \"bones are rubbing together\". She states the swelling has improved since last OV. Patient denies any fever, chills, chest pain, shortness of breath or calf pain. The remainder of the review of systems is negative. There are no new illness or injuries to report since last seen in the office. No changes in medications, allergies, social or family history. PHYSICAL EXAM:   Visit Vitals  /69   Pulse 75   Temp 98.4 °F (36.9 °C)   Ht 5' 4.5\" (1.638 m)   Wt 130 lb 9.6 oz (59.2 kg)   BMI 22.07 kg/m²      The patient is a well-developed, well-nourished female in no acute distress. The patient is alert and oriented times three. The patient appears to be well groomed. Mood and affect are normal.  LYMPHATIC: lymph nodes are not enlarged and are within normal limits  SKIN: normal in color and non tender to palpation. There are no bruises or abrasions noted. NEUROLOGICAL: Motor sensory exam is within normal limits. Reflexes are equal bilaterally. There is normal sensation to pinprick and light touch  ORTHOPEDIC EXAM of right shoulder:  Inspection: swelling not present,  Bruising not present  Incision well healed  Passive glenohumeral abduction 0-30 degrees,  Strength: 3/5  2+ distal pulses      IMAGING:  XR of right shoulder obtained in the office dated 9/02/2020 was reviewed and read by Dr. Sreedhar Rene: no change in position      CT of right shoulder from [facility] dated 9/01/2020 was reviewed and read by Dr. Sreedhar Rene:   IMPRESSION:  1.  Limited study due to artifacts  2. Soft tissue density still seen in the subacromial space, no full-thickness rotator cuff tendon rupture suspected. No severe tendon retraction seen. However there is severe atrophy of supraspinatus and subscapularis muscular bellies. No glenohumeral joint effusion. AC joint hypertrophy. 3 view xray images of right taken in office on 8/31/2020 read and reviewed by myself reveal reverse total shoulder hardware in excellent position         IMPRESSION:  S/P Right reverse total shoulder arthroplasty  Encounter Diagnosis   Name Primary?  Rotator cuff tear arthropathy of right shoulder Yes        PLAN:   1. Pt presents s/p Right reverse total shoulder arthroplasty on 5/15/2020. The labs ordered at last OV do not reveal any evidence of infection. Will continue with PROM in PT. Stressed no increases in pain or swelling. I advised the patient to try OTC Voltaren gel for her soreness. Will follow up in 3 weeks. Risk factors include: smoker  2. No cortisone injection indicated today   3. No Physical/Occupational Therapy indicated today  4. No diagnostic test indicated today:   5. No durable medical equipment indicated today  6. No referral indicated today   7. No medications indicated today:   8. No Narcotic indicated today         RTC 3 weeks      Scribed by Melonie Treadwell [id number]) as dictated by Kyle Marie MD    I, Dr. Kyle Marie, confirm that all documentation is accurate.     Kyle Marie M.D.   [facility]

## 2020-09-09 NOTE — TELEPHONE ENCOUNTER
Pt is requesting the generic cream form of the Voltaren. ..states her pharmacist told her the Voltaren brand itself is on back order.     Pharmacy: [facility]    Pt H#[phone]

## 2020-09-10 ENCOUNTER — HOSPITAL ENCOUNTER (OUTPATIENT)
Dept: PHYSICAL THERAPY | Age: 65
Discharge: HOME | End: 2020-09-10
Payer: MEDICARE

## 2020-09-10 PROCEDURE — 97110 THERAPEUTIC EXERCISES: CPT

## 2020-09-10 PROCEDURE — 97140 MANUAL THERAPY 1/> REGIONS: CPT

## 2020-09-10 NOTE — PROGRESS NOTES
PT DAILY TREATMENT NOTE 10-18    Patient Name: Jeff Harrell  Date:9/10/2020  : 1955  [x]  Patient  Verified  [place]or: [place] MED[place] / Plan: [place] MEDICARE PART A & B / Product Type: Medicare /    In time: 9:00  Out time: 9:26  Total Treatment Time (min): 26  Visit #: 1 of 8    Medicare/BCBS Only   Total Timed Codes (min):  26 1:1 Treatment Time:  26       Treatment Area: Pain in right shoulder [M25.511]  Complete rotator cuff tear or rupture of right shoulder, not specified as traumatic [M75.121]    SUBJECTIVE  Pain Level (0-10 scale): 0/10  Any medication changes, allergies to medications, adverse drug reactions, diagnosis change, or new procedure performed?: [x] No    [] Yes (see summary sheet for update)  Subjective functional status/changes:   [] No changes reported  Pt. Reports her shoulder has been feeling a lot better. OBJECTIVE    16 min Therapeutic Exercise:  [x] See flow sheet :   Rationale: increase ROM and increase strength to improve the patients ability to increase ease of ADLs    10 min Manual Therapy:  Trigger point release to infraspinatus, scap mobs, grade 2 inf AC mobs, GH clocks   Rationale: decrease pain, increase ROM and increase tissue extensibility to increase ease of ADLs          With   [x] TE   [] TA   [] neuro   [] other: Patient Education: [x] Review HEP    [] Progressed/Changed HEP based on:   [] positioning   [] body mechanics   [] transfers   [] heat/ice application    [] other:      Other Objective/Functional Measures:   Pt. Tolerated PT well with no reports of increased pain  She continues to have decreased flexion PROM compared to abduction  Shoulder PROM has empty end feel into flexion and abduction      Pain Level (0-10 scale) post treatment: 0/10    ASSESSMENT/Changes in Function:  Pt. Is progressing slowly with PT. She is having less pain currently and improving PROM. Physician recommends no strengthening at this time with focus on PROM and improving pain. Patient will continue to benefit from skilled PT services to modify and progress therapeutic interventions, address functional mobility deficits, address ROM deficits, address strength deficits, analyze and address soft tissue restrictions, analyze and cue movement patterns, analyze and modify body mechanics/ergonomics and assess and modify postural abnormalities to attain remaining goals. Progress towards goals / Updated goals:  1. Patient will improve FOTO score by 44 points in order to demonstrate a significant improvement in function. 2. Patient will improve right shoulder flex/abd PROM to 120 degrees in order to increase ease of ADLs. 3. Patient will report pain at 2/10 or less during ADLs in order to improve quality of life. Progressing: pt.  Has decreased pain today (9/10/20)     PLAN  []  Upgrade activities as tolerated     [x]  Continue plan of care  []  Update interventions per flow sheet       []  Discharge due to:_  []  Other:_      Waleska Phelps, PT 9/10/2020  8:36 AM    Future Appointments   Date Time Provider Jenny Andrewi   9/10/2020  9:00 AM Robin Maya, PT [facility]   9/14/2020  9:00 AM Phylliss Coast, PTA [facility]   9/17/2020  9:45 AM Robin Maya, PT [facility]   9/21/2020  9:45 AM Robin Maya, PT [facility]   9/24/2020  9:00 AM Robin Maya, PT [facility]   9/28/2020  9:00 AM Phylliss Coast, PTA [facility]   9/30/2020  1:00 PM Geri Sagastume MD [facility]   10/1/2020  9:45 AM Robin Maya, PT [facility]

## 2020-09-14 ENCOUNTER — HOSPITAL ENCOUNTER (OUTPATIENT)
Dept: PHYSICAL THERAPY | Age: 65
Discharge: HOME | End: 2020-09-14
Payer: MEDICARE

## 2020-09-14 PROCEDURE — 97110 THERAPEUTIC EXERCISES: CPT

## 2020-09-14 PROCEDURE — 97140 MANUAL THERAPY 1/> REGIONS: CPT

## 2020-09-17 ENCOUNTER — HOSPITAL ENCOUNTER (OUTPATIENT)
Dept: PHYSICAL THERAPY | Age: 65
Discharge: HOME | End: 2020-09-17
Payer: MEDICARE

## 2020-09-17 PROCEDURE — 97140 MANUAL THERAPY 1/> REGIONS: CPT

## 2020-09-17 PROCEDURE — 97110 THERAPEUTIC EXERCISES: CPT

## 2020-09-17 PROCEDURE — 97016 VASOPNEUMATIC DEVICE THERAPY: CPT

## 2020-09-17 NOTE — PROGRESS NOTES
PT DAILY TREATMENT NOTE 10-18    Patient Name: Jerome Wilkes  Date:2020  : 1955  [x]  Patient  Verified  Payor: [place] MEDICARE / Plan: [place] MEDICARE PART A & B / Product Type: Medicare /    In time: 9:47  Out time: 10:25  Total Treatment Time (min): 38  Visit #: 2 of 8    Medicare/BCBS Only   Total Timed Codes (min):  23 1:1 Treatment Time:  23       Treatment Area: Pain in right shoulder [M25.511]  Complete rotator cuff tear or rupture of right shoulder, not specified as traumatic [M75.121]    SUBJECTIVE  Pain Level (0-10 scale): 1-2/10  Any medication changes, allergies to medications, adverse drug reactions, diagnosis change, or new procedure performed?: [x] No    [] Yes (see summary sheet for update)  Subjective functional status/changes:   [] No changes reported  Pt. Reports she still has pain on the top of her shoulder but is doing better than before.      OBJECTIVE    Modality rationale: decrease edema, decrease inflammation and decrease pain to improve the patients ability to increase ease of reaching   Min Type Additional Details    [] Estim:  []Unatt       []IFC  []Premod                        []Other:  []w/ice   []w/heat  Position:  Location:    [] Estim: []Att    []TENS instruct  []NMES                    []Other:  []w/US   []w/ice   []w/heat  Position:  Location:    []  Traction: [] Cervical       []Lumbar                       [] Prone          []Supine                       []Intermittent   []Continuous Lbs:  [] before manual  [] after manual    []  Ultrasound: []Continuous   [] Pulsed                           []1MHz   []3MHz W/cm2:  Location:    []  Iontophoresis with dexamethasone         Location: [] Take home patch   [] In clinic    []  Ice     []  heat  []  Ice massage  []  Laser   []  Anodyne Position:  Location:    []  Laser with stim  []  Other:  Position:  Location:   15 []  Vasopneumatic Device Pressure:       [x] lo [] med [] hi   Temperature: [x] lo [] med [] hi   [x] Skin assessment post-treatment:  [x]intact []redness- no adverse reaction    []redness  adverse reaction:     13 min Therapeutic Exercise:  [x] See flow sheet :   Rationale: increase ROM and increase strength to improve the patients ability to increase ease of reaching    10 min Manual Therapy:    Trigger point release to infraspinatus, scap mobs, GH clocks, grade 2 AC inf mobs   Rationale: decrease pain, increase ROM and increase tissue extensibility to increase ease of ADLs          With   [x] TE   [] TA   [] neuro   [] other: Patient Education: [x] Review HEP    [] Progressed/Changed HEP based on:   [] positioning   [] body mechanics   [] transfers   [] heat/ice application    [] other:      Other Objective/Functional Measures:   Right shoulder PROM flexion: 122 degrees scaption: 108 degrees  Pt. Has tenderness to palpation over supraspinatus  She has decreased scapula mobility     Pain Level (0-10 scale) post treatment: 0/10    ASSESSMENT/Changes in Function:  Pt. Is progressing slowly towards goals. She demonstrates improving right shoulder PROM mobility. She also has increased ease of performing daily activities at waist level but continues to be challenged with overhead activities. Patient will continue to benefit from skilled PT services to modify and progress therapeutic interventions, address functional mobility deficits, address ROM deficits, address strength deficits, analyze and address soft tissue restrictions and analyze and cue movement patterns to attain remaining goals. Progress towards goals / Updated goals:  1. Patient will improve FOTO score by 44 points in order to demonstrate a significant improvement in function. 2. Patient will improve right shoulder flex/abd PROM to 120 degrees in order to increase ease of ADLs. Progressing: flex: 122 degrees scaption: 108 degrees (9/17/20)  3.  Patient will report pain at 2/10 or less during ADLs in order to improve quality of life.  Progressing: pt.  Has decreased pain today (9/10/20)     PLAN  []  Upgrade activities as tolerated     [x]  Continue plan of care  []  Update interventions per flow sheet       []  Discharge due to:_  []  Other:_      Lucius Lanes, PT 9/17/2020  8:00 AM    Future Appointments   Date Time Provider Jenny Bro   9/17/2020  9:45 AM Lawrence Qasim, PT [facility]   9/21/2020  9:45 AM Pena Che, PTA [facility]   9/24/2020  9:00 AM Lawrence Tripp, PT [facility]   9/28/2020  9:00 AM Pena Che, PTA [facility]   9/30/2020  1:00 PM Durga Pozo MD [place] [id number]   10/1/2020  9:45 AM Lawrence Angela, PT [facility]

## 2020-09-21 ENCOUNTER — HOSPITAL ENCOUNTER (OUTPATIENT)
Dept: PHYSICAL THERAPY | Age: 65
Discharge: HOME | End: 2020-09-21
Payer: MEDICARE

## 2020-09-21 PROCEDURE — 97140 MANUAL THERAPY 1/> REGIONS: CPT

## 2020-09-21 PROCEDURE — 97110 THERAPEUTIC EXERCISES: CPT

## 2020-09-21 NOTE — PROGRESS NOTES
PT DAILY TREATMENT NOTE 10-18    Patient Name: Timo Sultana  Date:2020  : 1955  [x]  Patient  Verified  Payor: [place] MEDICARE / Plan: [place] MEDICARE PART A & B / Product Type: Medicare /    In time:945  Out time:1010  Total Treatment Time (min): 25  Visit #: 3 of 8    Medicare/BCBS Only   Total Timed Codes (min):  25 1:1 Treatment Time:  25       Treatment Area: Pain in right shoulder [M25.511]  Complete rotator cuff tear or rupture of right shoulder, not specified as traumatic [M75.121]    SUBJECTIVE  Pain Level (0-10 scale): 0/10  Any medication changes, allergies to medications, adverse drug reactions, diagnosis change, or new procedure performed?: [x] No    [] Yes (see summary sheet for update)  Subjective functional status/changes:   [] No changes reported  Pt stated that she does not really have pain today    OBJECTIVE    10 min Therapeutic Exercise:  [x] See flow sheet :   Rationale: increase ROM to improve the patients ability to increase ease with ADLs    15 min Manual Therapy:  Trigger point release to infraspinatus, scap mobs, GH clocks   Rationale: decrease pain and increase ROM to increase ease with ADLs    With   [x] TE   [] TA   [] neuro   [] other: Patient Education: [x] Review HEP    [] Progressed/Changed HEP based on:   [] positioning   [] body mechanics   [] transfers   [] heat/ice application    [] other:      Other Objective/Functional Measures:   Pt declined modalities today  Had no complaint of increased pain with exercises  Had significant sensitivity on infraspinatus today  Fair scap mobility     Pain Level (0-10 scale) post treatment: 0/10    ASSESSMENT/Changes in Function:   Pt is making limited progress toward goals as no strengthening is allowed per MD. Pt has limited AROM in the right shoulder.  Pt cont to report difficulty with most ADLs    Patient will continue to benefit from skilled PT services to modify and progress therapeutic interventions, address functional mobility deficits, address ROM deficits, address strength deficits, analyze and address soft tissue restrictions, analyze and cue movement patterns, assess and modify postural abnormalities and instruct in home and community integration to attain remaining goals. []  See Plan of Care  [x]  See progress note/recertification  []  See Discharge Summary         Progress towards goals / Updated goals:  1. Patient will improve FOTO score by 44 points in order to demonstrate a significant improvement in function. 2. Patient will improve right shoulder flex/abd PROM to 120 degrees in order to increase ease of ADLs. Progressing: flex: 122 degrees scaption: 108 degrees (9/17/20)  3. Patient will report pain at 2/10 or less during ADLs in order to improve quality of life. Progressing: pt.  Has decreased pain today (9/10/20)     PLAN  []  Upgrade activities as tolerated     [x]  Continue plan of care  []  Update interventions per flow sheet       []  Discharge due to:_  []  Other:_      Jason Zurita PTA 9/21/2020  6:47 AM    Future Appointments   Date Time Provider Jenny Bro   9/21/2020  9:45 AM Michelle Jimenez PTA [facility]   9/24/2020  9:00 AM Charity Schumacher, PT [facility]   9/28/2020  9:00 AM Michelle Jimenez PTA [facility]   9/30/2020  1:00 PM Andrea Mayo MD [facility]   10/1/2020  9:45 AM Charity Schumacher PT [facility]

## 2020-09-24 ENCOUNTER — HOSPITAL ENCOUNTER (OUTPATIENT)
Dept: PHYSICAL THERAPY | Age: 65
Discharge: HOME | End: 2020-09-24
Payer: MEDICARE

## 2020-09-24 PROCEDURE — 97140 MANUAL THERAPY 1/> REGIONS: CPT

## 2020-09-24 PROCEDURE — 97110 THERAPEUTIC EXERCISES: CPT

## 2020-09-24 NOTE — PROGRESS NOTES
PT DAILY TREATMENT NOTE 10-18    Patient Name: Darold Cogan  Date:2020  : 1955  [x]  Patient  Verified  Payor: [place] MEDICARE / Plan: [place] MEDICARE PART A & B / Product Type: Medicare /    In time: 8:57  Out time: 9:36  Total Treatment Time (min): 39  Visit #: 4 of 8    Medicare/BCBS Only   Total Timed Codes (min):  39 1:1 Treatment Time:  39       Treatment Area: Pain in right shoulder [M25.511]  Complete rotator cuff tear or rupture of right shoulder, not specified as traumatic [M75.121]    SUBJECTIVE  Pain Level (0-10 scale):  0/10  Any medication changes, allergies to medications, adverse drug reactions, diagnosis change, or new procedure performed?: [x] No    [] Yes (see summary sheet for update)  Subjective functional status/changes:   [] No changes reported  Pt. Reports she has soreness but not pain. She is using right arm more at home but nothing with reaching overhead. OBJECTIVE    16 min Therapeutic Exercise:  [x] See flow sheet :   Rationale: increase ROM and increase strength to improve the patients ability to increase ease of ADLs    23 min Manual Therapy:  Trigger point release to infraspinatus, subscapularis, and supraspinatus, scap mobs, grade 2 AC inf mobs   Rationale: decrease pain, increase ROM and increase tissue extensibility to increase ease of reaching    With   [x] TE   [] TA   [] neuro   [] other: Patient Education: [x] Review HEP    [] Progressed/Changed HEP based on:   [] positioning   [] body mechanics   [] transfers   [] heat/ice application    [] other:      Other Objective/Functional Measures:   Right shoulder PROM flex: 122 degrees abd: 112 degrees  PROM improves following manual  She has good form with scap squeezes today    Pain Level (0-10 scale) post treatment: 0/10    ASSESSMENT/Changes in Function:  Pt is progressing slowly towards goals. She has improving right shoulder PROM but continues to have difficulty with ADLs.  She continues to have significant muscle tightness in posterior shoulder. Patient will continue to benefit from skilled PT services to modify and progress therapeutic interventions, address functional mobility deficits, address ROM deficits, address strength deficits, analyze and address soft tissue restrictions, analyze and cue movement patterns, analyze and modify body mechanics/ergonomics and assess and modify postural abnormalities to attain remaining goals. Progress towards goals / Updated goals:  1. Patient will improve FOTO score by 44 points in order to demonstrate a significant improvement in function. 2. Patient will improve right shoulder flex/abd PROM to 120 degrees in order to increase ease of ADLs.    Progressing: flex: 122 degrees abd: 112 degrees (9/24/20)  3. Patient will report pain at 2/10 or less during ADLs in order to improve quality of life. Progressing: pt.  Has decreased pain today (9/10/20)     PLAN  []  Upgrade activities as tolerated     [x]  Continue plan of care  []  Update interventions per flow sheet       []  Discharge due to:_  []  Other:_      Cece Chaparro, PT 9/24/2020  7:57 AM    Future Appointments   Date Time Provider Jenny Bro   9/24/2020  9:00 AM Misty García PT [facility]   9/28/2020  9:00 AM Celestina Olmos PTA [facility]   9/30/2020  1:00 PM Chriss Potter MD [facility]   10/1/2020  9:45 AM Misty García PT [facility]

## 2020-09-28 ENCOUNTER — HOSPITAL ENCOUNTER (OUTPATIENT)
Dept: PHYSICAL THERAPY | Age: 65
Discharge: HOME | End: 2020-09-28
Payer: MEDICARE

## 2020-09-28 PROCEDURE — 97140 MANUAL THERAPY 1/> REGIONS: CPT

## 2020-09-28 PROCEDURE — 97110 THERAPEUTIC EXERCISES: CPT

## 2020-09-28 NOTE — PROGRESS NOTES
PT DAILY TREATMENT NOTE 10-18    Patient Name: Vianey Deutsch  Date:2020  : 1955  [x]  Patient  Verified  Payor: [place] MEDICARE / Plan: [place] MEDICARE PART A & B / Product Type: Medicare /    In time:900  Out time:938  Total Treatment Time (min): 38  Visit #: 5 of 8    Medicare/BCBS Only   Total Timed Codes (min):  38 1:1 Treatment Time:  38       Treatment Area: Pain in right shoulder [M25.511]  Complete rotator cuff tear or rupture of right shoulder, not specified as traumatic [M75.121]    SUBJECTIVE  Pain Level (0-10 scale): 1/10  Any medication changes, allergies to medications, adverse drug reactions, diagnosis change, or new procedure performed?: [x] No    [] Yes (see summary sheet for update)  Subjective functional status/changes:   [] No changes reported  Pt stated that she just has a little pain today in the right shoulder    OBJECTIVE    15 min Therapeutic Exercise:  [x] See flow sheet :   Rationale: increase ROM to improve the patients ability to increase ease with ADls    23 min Manual Therapy:  Trigger point release to infraspinatus, subscapularis, and supraspinatus, scap mobs   Rationale: decrease pain, increase ROM and increase tissue extensibility to increase ease with ADLs    With   [x] TE   [] TA   [] neuro   [] other: Patient Education: [x] Review HEP    [] Progressed/Changed HEP based on:   [] positioning   [] body mechanics   [] transfers   [] heat/ice application    [] other:      Other Objective/Functional Measures:   Had no difficulty with exercises  Uses poor form with pendulums     Pain Level (0-10 scale) post treatment: 0/10    ASSESSMENT/Changes in Function:   Pt is making slow progress with therapy. PROM is improving.  Pt cont to report difficulty with performing ADLs    Patient will continue to benefit from skilled PT services to modify and progress therapeutic interventions, address functional mobility deficits, address ROM deficits, analyze and address soft tissue restrictions, analyze and cue movement patterns, analyze and modify body mechanics/ergonomics, assess and modify postural abnormalities and instruct in home and community integration to attain remaining goals. []  See Plan of Care  [x]  See progress note/recertification  []  See Discharge Summary         Progress towards goals / Updated goals:  1. Patient will improve FOTO score by 44 points in order to demonstrate a significant improvement in function. 2. Patient will improve right shoulder flex/abd PROM to 120 degrees in order to increase ease of ADLs.    Progressing: flex: 122 degrees abd: 112 degrees (9/24/20)  3. Patient will report pain at 2/10 or less during ADLs in order to improve quality of life. Progressing: pt.  Has decreased pain today (9/10/20)     PLAN  []  Upgrade activities as tolerated     [x]  Continue plan of care  []  Update interventions per flow sheet       []  Discharge due to:_  []  Other:_      Kimberly Herr PTA 9/28/2020  6:54 AM    Future Appointments   Date Time Provider Jenny Andrewi   9/28/2020  9:00 AM Loraine Trevino PTA [facility]   9/30/2020  1:00 PM Sarah Joseph MD [facility]   10/1/2020  9:45 AM Gissell Newsome PT [facility]

## 2020-10-01 ENCOUNTER — HOSPITAL ENCOUNTER (OUTPATIENT)
Dept: PHYSICAL THERAPY | Age: 65
Discharge: HOME | End: 2020-10-01
Payer: MEDICARE

## 2020-10-01 PROCEDURE — 97110 THERAPEUTIC EXERCISES: CPT

## 2020-10-01 PROCEDURE — 97140 MANUAL THERAPY 1/> REGIONS: CPT

## 2020-10-01 NOTE — PROGRESS NOTES
[facility]  [address] [facility]  [phone] fax    Physical Therapy Discharge Summary    Patient name: Teresa Bates Start of Care: 5/20/2020   Referral source: Nunu Everardo Sylvia,* QJM: [id number]   Medical/Treatment Diagnosis: Pain in right shoulder [M25.511]  Complete rotator cuff tear or rupture of right shoulder, not specified as traumatic [M75.121]  Payor: [place] MEDICARE / Plan: [place] / Product Type: Medicare /  Onset Date: 05/15/2020      Prior Hospitalization: see medical history Provider#: [id number]   Medications: Verified on Patient Summary List     Comorbidities: OA, Hepatitis A (1973)  Prior Level of Function: I with all ADLs and household tasks, driving            Visits from Start of Care: 31    Missed Visits: 2    Reporting Period :  9/3/20 to 9/28/20    Summary of Care:  Goal: Patient will improve FOTO score by 44 points in order to demonstrate a significant improvement in function. Status at last note/certification: 56 points  Status at discharge: not met    Goal: Patient will improve right shoulder flex/abd PROM to 120 degrees in order to increase ease of ADLs. Status at last note/certification: flex: 90 degrees abd: 60 degrees  Status at discharge: met    Goal: Patient will report pain at 2/10 or less during ADLs in order to improve quality of life. Status at last note/certification: 6/05  Status at discharge: met    Pt. Has progressed with physical therapy. FOTO score has no significant change at 51 points. She is having less pain during her daily activities with reports of pain only getting to 1/10 during ADLs. She also has increased ease of sleeping. Right shoulder PROM also improved flex: 124 degrees abd: 131 degrees. She continues to have difficulty with reaching and has decreased AROM mobility. Pt. Demonstrates good understanding of her HEP and was educated on continuing with this following D/C. ASSESSMENT/RECOMMENDATIONS:  []Discontinue therapy: []Patient has reached or is progressing toward set goals      []Patient is non-compliant or has abdicated      []Due to lack of appreciable progress towards set goals    Eloisa Balderas PT 10/1/2020 8:29 AM

## 2020-10-01 NOTE — PROGRESS NOTES
Physical Therapy Discharge Instructions      [facility]  [address] [facility]  [phone] fax    Patient: Shawna Finn  : 1955      Continue Home Exercise Program 2 times per day for 8 weeks, then decrease to 3 times per week      Continue with    [x] Ice  as needed      [] Heat           Follow up with MD:     [x] Upon completion of therapy     [] As needed    Recommendations:     [x]   Return to activity with home program    []   Return to activity with the following modifications:       []Post Rehab Program    []Join Independent aquatic program     []Return to/join local gym    Additional Comments: Keep up the great work at home. Avoid activities that increase pain.     Majo Jacobs, PT 10/1/2020 10:18 AM

## 2020-10-01 NOTE — PROGRESS NOTES
PT DAILY TREATMENT NOTE 10-18    Patient Name: Palak Hebert  Date:10/1/2020  : 1955  [x]  Patient  Verified  Payor: VA MEDICARE / Plan: VA MEDICARE PART A & B / Product Type: Medicare /    In time: 9:45  Out time: 10:23  Total Treatment Time (min): 38  Visit #: 6 of 8    Medicare/BCBS Only   Total Timed Codes (min):  38 1:1 Treatment Time:  38       Treatment Area: Pain in right shoulder [M25.511]  Complete rotator cuff tear or rupture of right shoulder, not specified as traumatic [M75.121]    SUBJECTIVE  Pain Level (0-10 scale): 0/10  Any medication changes, allergies to medications, adverse drug reactions, diagnosis change, or new procedure performed?: [x] No    [] Yes (see summary sheet for update)  Subjective functional status/changes:   [] No changes reported  Pt. Reports she is still doing pretty good but has trouble lifting her arm. She is agreeable to D/C today and to follow back up with physician. OBJECTIVE    23 min Therapeutic Exercise:  [x] See flow sheet :   Rationale: increase ROM and increase strength to improve the patients ability to increase ease of ADLs    15 min Manual Therapy:  Trigger point release and STM to infraspinatus, subscap. scap mobs, AC inf mobs grade 2   Rationale: decrease pain, increase ROM and increase tissue extensibility to increase ease of reaching          With   [x] TE   [] TA   [] neuro   [] other: Patient Education: [x] Review HEP    [] Progressed/Changed HEP based on:   [] positioning   [] body mechanics   [] transfers   [] heat/ice application    [] other:      Other Objective/Functional Measures:    FOTO: 51 points  Right shoulder PROM flex: 124 abd: 131 degrees  Pain during daily activities: 1/10      Pain Level (0-10 scale) post treatment: 0/10    ASSESSMENT/Changes in Function:       []  See Plan of Care  []  See progress note/recertification  [x]  See Discharge Summary         Progress towards goals / Updated goals:  See D/C note    PLAN  []  Upgrade activities as tolerated     []  Continue plan of care  []  Update interventions per flow sheet       [x]  Discharge due to:_ progress towards goals.    []  Other:_      Froylan Beck, PT 10/1/2020  7:51 AM    Future Appointments   Date Time Provider Jenny Bro   10/1/2020  9:45 AM Yazmin Carroll PT [id number]   10/5/2020  3:20 PM Alejandra Gomez MD [facility]

## 2020-10-05 ENCOUNTER — OFFICE VISIT (OUTPATIENT)
Dept: ORTHOPEDIC SURGERY | Age: 65
End: 2020-10-05
Payer: MEDICARE

## 2020-10-05 VITALS
HEIGHT: 65 IN | TEMPERATURE: 98.7 F | OXYGEN SATURATION: 97 % | WEIGHT: 131.8 LBS | RESPIRATION RATE: 16 BRPM | HEART RATE: 74 BPM | SYSTOLIC BLOOD PRESSURE: 135 MMHG | BODY MASS INDEX: 21.96 KG/M2 | DIASTOLIC BLOOD PRESSURE: 73 MMHG

## 2020-10-05 DIAGNOSIS — M79.671 RIGHT FOOT PAIN: ICD-10-CM

## 2020-10-05 DIAGNOSIS — S93.601A SPRAIN OF RIGHT FOOT, INITIAL ENCOUNTER: ICD-10-CM

## 2020-10-05 DIAGNOSIS — M75.101 ROTATOR CUFF TEAR ARTHROPATHY OF RIGHT SHOULDER: Primary | ICD-10-CM

## 2020-10-05 DIAGNOSIS — M12.811 ROTATOR CUFF TEAR ARTHROPATHY OF RIGHT SHOULDER: Primary | ICD-10-CM

## 2020-10-05 PROCEDURE — G8510 SCR DEP NEG, NO PLAN REQD: HCPCS | Performed by: ORTHOPAEDIC SURGERY

## 2020-10-05 PROCEDURE — 1101F PT FALLS ASSESS-DOCD LE1/YR: CPT | Performed by: ORTHOPAEDIC SURGERY

## 2020-10-05 PROCEDURE — G8420 CALC BMI NORM PARAMETERS: HCPCS | Performed by: ORTHOPAEDIC SURGERY

## 2020-10-05 PROCEDURE — G8427 DOCREV CUR MEDS BY ELIG CLIN: HCPCS | Performed by: ORTHOPAEDIC SURGERY

## 2020-10-05 PROCEDURE — 3017F COLORECTAL CA SCREEN DOC REV: CPT | Performed by: ORTHOPAEDIC SURGERY

## 2020-10-05 PROCEDURE — G8400 PT W/DXA NO RESULTS DOC: HCPCS | Performed by: ORTHOPAEDIC SURGERY

## 2020-10-05 PROCEDURE — 99213 OFFICE O/P EST LOW 20 MIN: CPT | Performed by: ORTHOPAEDIC SURGERY

## 2020-10-05 PROCEDURE — 73630 X-RAY EXAM OF FOOT: CPT | Performed by: ORTHOPAEDIC SURGERY

## 2020-10-05 PROCEDURE — 1090F PRES/ABSN URINE INCON ASSESS: CPT | Performed by: ORTHOPAEDIC SURGERY

## 2020-10-05 PROCEDURE — G8536 NO DOC ELDER MAL SCRN: HCPCS | Performed by: ORTHOPAEDIC SURGERY

## 2020-10-05 NOTE — PROGRESS NOTES
David Rae  1955     HISTORY OF PRESENT ILLNESS  David Rae is a 77 y.o. female who presents today for evaluation s/p Right reverse total shoulder arthroplasty on 5/15/2020. Patient has been going to PT. Pain is a 0/10. She reports soreness in the shoulder today. She states she has more use of her arm but is still having pain. She has finished with PT, has been doing HEP. She states she cannot raise her arm up. Pt also notes right foot pain today. She tripped over her bed comforter yesterday, 10/04/2020. She states her toes bent backwards when she tripped. She woke up this morning and noticed bruising in her foot, mostly her toes. Patient denies any fever, chills, chest pain, shortness of breath or calf pain. The remainder of the review of systems is negative. There are no new illness or injuries to report since last seen in the office. No changes in medications, allergies, social or family history. PHYSICAL EXAM:   Visit Vitals  /73 (BP 1 Location: Left arm, BP Patient Position: Sitting)   Pulse 74   Temp 98.7 °F (37.1 °C) (Temporal)   Resp 16   Ht 5' 4.5\" (1.638 m)   Wt 131 lb 12.8 oz (59.8 kg)   SpO2 97%   BMI 22.27 kg/m²      The patient is a well-developed, well-nourished female in no acute distress. The patient is alert and oriented times three. The patient appears to be well groomed. Mood and affect are normal.  LYMPHATIC: lymph nodes are not enlarged and are within normal limits  SKIN: normal in color and non tender to palpation. There are no bruises or abrasions noted. NEUROLOGICAL: Motor sensory exam is within normal limits. Reflexes are equal bilaterally.  There is normal sensation to pinprick and light touch  ORTHOPEDIC EXAM of right shoulder:  Inspection: swelling not present,  Bruising not present  Incision well healed  Passive glenohumeral abduction 0-30 degrees,  Strength: 3/5  2+ distal pulses      Examination Right Ankle/Foot   Skin Intact   Swelling + bruising Dorsiflexion 10   Plantarflexion 25   Deformity -   Inversion laxity -   Anterior drawer -   Medial malleolus tenderness -   Lateral malleolus tenderness -   Heel cord Intact   Heel cord Tightness -   Chin's Test -   Angela's Test -   Sensation Intact   Bunion -   Capillary refill Normal         IMAGING: XR of right foot obtained in the office dated 10/05/2020 was reviewed and read by Dr. Rickey Hagan: nno acute abnormalities        XR of right shoulder obtained in the office dated 9/02/2020 was reviewed and read by Dr. Rickey Hagan: no change in position        CT of right shoulder from [facility] dated 9/01/2020 was reviewed and read by Dr. Rickey Hagan:   IMPRESSION:  1. Limited study due to artifacts  2. Soft tissue density still seen in the subacromial space, no full-thickness rotator cuff tendon rupture suspected. No severe tendon retraction seen. However there is severe atrophy of supraspinatus and subscapularis muscular bellies. No glenohumeral joint effusion. AC joint hypertrophy. 3 view xray images of right taken in office on 8/31/2020 read and reviewed by myself reveal reverse total shoulder hardware in excellent position         IMPRESSION:  S/P Right reverse total shoulder arthroplasty  Encounter Diagnoses   Name Primary?  Right foot pain     Rotator cuff tear arthropathy of right shoulder Yes    Sprain of right foot, initial encounter         PLAN:   1. Pt presents s/p Right reverse total shoulder arthroplasty on 5/15/2020. Continue with HEP, work on ROM over the next month. Pt was offered formal PT but would like to continue with her own HEP. She also complains of right foot pain today due to a foot sprain and  I advised her to wear sturdy shoes and will see her back in 1 month for repeat XR. Risk factors include: smoker  2. No cortisone injection indicated today   3. No Physical/Occupational Therapy indicated today  4. No diagnostic test indicated today:   5.  No durable medical equipment indicated today  6. No referral indicated today   7. No medications indicated today:   8. No Narcotic indicated today         RTC 1 month for repeat XR      Scribed by [address]) as dictated by MD JACINTA Reyna, Dr. Iveth Poole, confirm that all documentation is accurate.     Iveth Poole M.D.   [facility]

## 2020-11-02 ENCOUNTER — OFFICE VISIT (OUTPATIENT)
Dept: ORTHOPEDIC SURGERY | Age: 65
End: 2020-11-02
Payer: MEDICARE

## 2020-11-02 VITALS
HEART RATE: 78 BPM | OXYGEN SATURATION: 98 % | WEIGHT: 132.6 LBS | BODY MASS INDEX: 22.09 KG/M2 | RESPIRATION RATE: 16 BRPM | TEMPERATURE: 97.1 F | SYSTOLIC BLOOD PRESSURE: 119 MMHG | DIASTOLIC BLOOD PRESSURE: 64 MMHG | HEIGHT: 65 IN

## 2020-11-02 DIAGNOSIS — S93.601D SPRAIN OF RIGHT FOOT, SUBSEQUENT ENCOUNTER: ICD-10-CM

## 2020-11-02 DIAGNOSIS — M12.811 ROTATOR CUFF TEAR ARTHROPATHY OF RIGHT SHOULDER: Primary | ICD-10-CM

## 2020-11-02 DIAGNOSIS — M75.101 ROTATOR CUFF TEAR ARTHROPATHY OF RIGHT SHOULDER: Primary | ICD-10-CM

## 2020-11-02 PROCEDURE — G8427 DOCREV CUR MEDS BY ELIG CLIN: HCPCS | Performed by: ORTHOPAEDIC SURGERY

## 2020-11-02 PROCEDURE — 3017F COLORECTAL CA SCREEN DOC REV: CPT | Performed by: ORTHOPAEDIC SURGERY

## 2020-11-02 PROCEDURE — 1101F PT FALLS ASSESS-DOCD LE1/YR: CPT | Performed by: ORTHOPAEDIC SURGERY

## 2020-11-02 PROCEDURE — G8420 CALC BMI NORM PARAMETERS: HCPCS | Performed by: ORTHOPAEDIC SURGERY

## 2020-11-02 PROCEDURE — 1090F PRES/ABSN URINE INCON ASSESS: CPT | Performed by: ORTHOPAEDIC SURGERY

## 2020-11-02 PROCEDURE — G8536 NO DOC ELDER MAL SCRN: HCPCS | Performed by: ORTHOPAEDIC SURGERY

## 2020-11-02 PROCEDURE — G8400 PT W/DXA NO RESULTS DOC: HCPCS | Performed by: ORTHOPAEDIC SURGERY

## 2020-11-02 PROCEDURE — G8510 SCR DEP NEG, NO PLAN REQD: HCPCS | Performed by: ORTHOPAEDIC SURGERY

## 2020-11-02 PROCEDURE — 99212 OFFICE O/P EST SF 10 MIN: CPT | Performed by: ORTHOPAEDIC SURGERY

## 2020-11-02 NOTE — PROGRESS NOTES
Robert Cisneros  1955     HISTORY OF PRESENT ILLNESS  Robert Cisneros is a 77 y.o. female who presents today for evaluation s/p Right reverse total shoulder arthroplasty on 5/15/2020. Pain is a 0/10. She has finished with PT, has been doing HEP. She states her shoulder and foot are doing better today. Has improved ROM with the shoulder. She tripped over her bed comforter on 10/04/2020. She states her toes bent backwards when she tripped. She woke up on 10/05/2020 and noticed bruising in her foot, mostly her toes. Patient denies any fever, chills, chest pain, shortness of breath or calf pain. The remainder of the review of systems is negative. There are no new illness or injuries to report since last seen in the office. No changes in medications, allergies, social or family history. PHYSICAL EXAM:   Visit Vitals  /64 (BP 1 Location: Left arm, BP Patient Position: Sitting)   Pulse 78   Temp 97.1 °F (36.2 °C) (Temporal)   Resp 16   Ht 5' 4.5\" (1.638 m)   Wt 132 lb 9.6 oz (60.1 kg)   SpO2 98%   BMI 22.41 kg/m²      The patient is a well-developed, well-nourished female in no acute distress. The patient is alert and oriented times three. The patient appears to be well groomed. Mood and affect are normal.  LYMPHATIC: lymph nodes are not enlarged and are within normal limits  SKIN: normal in color and non tender to palpation. There are no bruises or abrasions noted. NEUROLOGICAL: Motor sensory exam is within normal limits. Reflexes are equal bilaterally.  There is normal sensation to pinprick and light touch  ORTHOPEDIC EXAM of right shoulder:  Inspection: swelling not present,  Bruising not present  Incision well healed  Passive glenohumeral abduction 0-30 degrees,  Strength: 3/5  2+ distal pulses      Examination Right Ankle/Foot   Skin Intact   Swelling -   Dorsiflexion 10   Plantarflexion 25   Deformity -   Inversion laxity -   Anterior drawer -   Medial malleolus tenderness -   Lateral malleolus tenderness -   Heel cord Intact   Heel cord Tightness -   Chin's Test -   Angela's Test -   Sensation Intact   Bunion -   Capillary refill Normal         IMAGING:  XR of right foot obtained in the office dated 10/05/2020 was reviewed and read by Dr. Auguste All: nno acute abnormalities        XR of right shoulder obtained in the office dated 9/02/2020 was reviewed and read by Dr. Auguste All: no change in position          CT of right shoulder from [facility] dated 9/01/2020 was reviewed and read by Dr. Auguste All:   IMPRESSION:  1. Limited study due to artifacts  2. Soft tissue density still seen in the subacromial space, no full-thickness rotator cuff tendon rupture suspected. No severe tendon retraction seen. However there is severe atrophy of supraspinatus and subscapularis muscular bellies. No glenohumeral joint effusion. AC joint hypertrophy. 3 view xray images of right taken in office on 8/31/2020 read and reviewed by myself reveal reverse total shoulder hardware in excellent position         IMPRESSION:  S/P Right reverse total shoulder arthroplasty  Encounter Diagnoses   Name Primary?  Rotator cuff tear arthropathy of right shoulder Yes    Sprain of right foot, subsequent encounter         PLAN:   1. Pt presents s/p Right reverse total shoulder arthroplasty on 5/15/2020. She notes overall improvement with her shoulder and foot pain. Continue with HEP for shoulder. Will see her back in 3 months. Risk factors include: smoker  2. No cortisone injection indicated today   3. No Physical/Occupational Therapy indicated today  4. No diagnostic test indicated today:   5. No durable medical equipment indicated today  6. No referral indicated today   7. No medications indicated today:   8.  No Narcotic indicated today         RTC 3 months      Scribed by [address]) as dictated by Koby Barnard MD    I, Dr. Koby Barnard, confirm that all documentation is accurate.     Dorene Beckwith M.D.   [facility]

## 2021-02-02 ENCOUNTER — OFFICE VISIT (OUTPATIENT)
Dept: ORTHOPEDIC SURGERY | Age: 66
End: 2021-02-02
Payer: MEDICARE

## 2021-02-02 VITALS
DIASTOLIC BLOOD PRESSURE: 64 MMHG | HEIGHT: 67 IN | SYSTOLIC BLOOD PRESSURE: 129 MMHG | BODY MASS INDEX: 20.56 KG/M2 | HEART RATE: 81 BPM | WEIGHT: 131 LBS | TEMPERATURE: 97.7 F | RESPIRATION RATE: 16 BRPM

## 2021-02-02 DIAGNOSIS — M75.101 ROTATOR CUFF TEAR ARTHROPATHY OF RIGHT SHOULDER: Primary | ICD-10-CM

## 2021-02-02 DIAGNOSIS — M12.811 ROTATOR CUFF TEAR ARTHROPATHY OF RIGHT SHOULDER: Primary | ICD-10-CM

## 2021-02-02 PROCEDURE — G8432 DEP SCR NOT DOC, RNG: HCPCS | Performed by: PHYSICIAN ASSISTANT

## 2021-02-02 PROCEDURE — G8420 CALC BMI NORM PARAMETERS: HCPCS | Performed by: PHYSICIAN ASSISTANT

## 2021-02-02 PROCEDURE — 3017F COLORECTAL CA SCREEN DOC REV: CPT | Performed by: PHYSICIAN ASSISTANT

## 2021-02-02 PROCEDURE — 1090F PRES/ABSN URINE INCON ASSESS: CPT | Performed by: PHYSICIAN ASSISTANT

## 2021-02-02 PROCEDURE — G8427 DOCREV CUR MEDS BY ELIG CLIN: HCPCS | Performed by: PHYSICIAN ASSISTANT

## 2021-02-02 PROCEDURE — 1101F PT FALLS ASSESS-DOCD LE1/YR: CPT | Performed by: PHYSICIAN ASSISTANT

## 2021-02-02 PROCEDURE — 99213 OFFICE O/P EST LOW 20 MIN: CPT | Performed by: PHYSICIAN ASSISTANT

## 2021-02-02 PROCEDURE — G8536 NO DOC ELDER MAL SCRN: HCPCS | Performed by: PHYSICIAN ASSISTANT

## 2021-02-02 PROCEDURE — G8400 PT W/DXA NO RESULTS DOC: HCPCS | Performed by: PHYSICIAN ASSISTANT

## 2021-02-02 NOTE — PROGRESS NOTES
Patient  1955   Chief Complaint   Patient presents with    Shoulder Pain     right shoulder pain        HISTORY OF PRESENT ILLNESS   Patient is a 74 y.o. female who presents today for reevaluation of right shoulder. Patient rates pain as 0/10 today. Pt is s/p Right reverse total shoulder arthroplasty on 5/15/2020. She states she is doing a lot better, notes overall improvement. She can do a lot more with her arm, can reach up. Has occasional night pain if she sleeps on the shoulder wrong. Patient denies any fever, chills, chest pain, shortness of breath or calf pain. The remainder of the review of systems is negative. There are no new illness or injuries to report since last seen in the office. There are no changes to medications, allergies, family or social history. PHYSICAL EXAM:   Visit Vitals  /64 (BP 1 Location: Left upper arm, BP Patient Position: Sitting)   Pulse 81   Temp 97.7 °F (36.5 °C) (Temporal)   Resp 16   Ht 5' 7\" (1.702 m)   Wt 131 lb (59.4 kg)   BMI 20.52 kg/m²     The patient is a well-developed, well-nourished female   in no acute distress. The patient is alert and oriented times three. The patient is alert and oriented times three. Mood and affect are normal.  LYMPHATIC: lymph nodes are not enlarged and are within normal limits  SKIN: normal in color and non tender to palpation. There are no bruises or abrasions noted. NEUROLOGICAL: Motor sensory exam is within normal limits. Reflexes are equal bilaterally.  There is normal sensation to pinprick and light touch  MUSCULOSKELETAL:   Examination Right shoulder   Skin Intact   AC joint tenderness -   Biceps tenderness -   Forward flexion/Elevation    Active abduction    Glenohumeral abduction 90   External rotation ROM 90   Internal rotation ROM 70   Apprehension -   Morelias Relocation -   Jerk -   Load and Shift -   Obriens -   Speeds -   Impingement sign -   Supraspinatus/Empty Can -, 5/5 External Rotation Strength -, 5/5   Lift Off/Belly Press -, 5/5   Neurovascular Intact       IMAGING: XR of right shoulder obtained in the office dated 9/02/2020 was reviewed and read by Dr. Hua Males: no change in position          [facility] dated 9/01/2020 was reviewed and read by Dr. Hua Males:   IMPRESSION:  1. Limited study due to artifacts  2. Soft tissue density still seen in the subacromial space, no full-thickness rotator cuff tendon rupture suspected. No severe tendon retraction seen. However there is severe atrophy of supraspinatus and subscapularis muscular bellies. No glenohumeral joint effusion. AC joint hypertrophy.           3 view xray images of right taken in office on 8/31/2020 read and reviewed by myself reveal reverse total shoulder hardware in excellent position        IMPRESSION:      ICD-10-CM ICD-9-CM    1. Rotator cuff tear arthropathy of right shoulder  M75.101 716.81     M12.811          PLAN:   1. Pt presents s/p Right reverse total shoulder arthroplasty on 5/15/2020. She notes overall improvement, is able to use her arm more. She is doing well today and can return as needed. Risk factors include: smoker  2. No ultrasound exam indicated today  3. No cortisone injection indicated today   4. No Physical/Occupational Therapy indicated today  5. No diagnostic test indicated today:   6. No durable medical equipment indicated today  7. No referral indicated today   8. No medications indicated today:   9.  No Narcotic indicated today       RTC prn    Scribed by Tashia Alonzotingharry Soto as dictated by JUNIOR Reinoso PA-C [phone]

## 2021-09-29 ENCOUNTER — OFFICE VISIT (OUTPATIENT)
Dept: ORTHOPEDIC SURGERY | Age: 66
End: 2021-09-29
Payer: MEDICARE

## 2021-09-29 VITALS
HEIGHT: 67 IN | OXYGEN SATURATION: 99 % | HEART RATE: 89 BPM | TEMPERATURE: 97.1 F | WEIGHT: 127 LBS | BODY MASS INDEX: 19.93 KG/M2

## 2021-09-29 DIAGNOSIS — M25.561 RIGHT KNEE PAIN, UNSPECIFIED CHRONICITY: ICD-10-CM

## 2021-09-29 DIAGNOSIS — M17.11 PRIMARY OSTEOARTHRITIS OF RIGHT KNEE: Primary | ICD-10-CM

## 2021-09-29 PROCEDURE — 99214 OFFICE O/P EST MOD 30 MIN: CPT | Performed by: ORTHOPAEDIC SURGERY

## 2021-09-29 PROCEDURE — 20611 DRAIN/INJ JOINT/BURSA W/US: CPT | Performed by: ORTHOPAEDIC SURGERY

## 2021-09-29 PROCEDURE — G8420 CALC BMI NORM PARAMETERS: HCPCS | Performed by: ORTHOPAEDIC SURGERY

## 2021-09-29 PROCEDURE — 1090F PRES/ABSN URINE INCON ASSESS: CPT | Performed by: ORTHOPAEDIC SURGERY

## 2021-09-29 PROCEDURE — 1101F PT FALLS ASSESS-DOCD LE1/YR: CPT | Performed by: ORTHOPAEDIC SURGERY

## 2021-09-29 PROCEDURE — 73560 X-RAY EXAM OF KNEE 1 OR 2: CPT | Performed by: ORTHOPAEDIC SURGERY

## 2021-09-29 PROCEDURE — G8536 NO DOC ELDER MAL SCRN: HCPCS | Performed by: ORTHOPAEDIC SURGERY

## 2021-09-29 PROCEDURE — G8510 SCR DEP NEG, NO PLAN REQD: HCPCS | Performed by: ORTHOPAEDIC SURGERY

## 2021-09-29 PROCEDURE — 3017F COLORECTAL CA SCREEN DOC REV: CPT | Performed by: ORTHOPAEDIC SURGERY

## 2021-09-29 PROCEDURE — G8427 DOCREV CUR MEDS BY ELIG CLIN: HCPCS | Performed by: ORTHOPAEDIC SURGERY

## 2021-09-29 PROCEDURE — G8400 PT W/DXA NO RESULTS DOC: HCPCS | Performed by: ORTHOPAEDIC SURGERY

## 2021-09-29 RX ORDER — CELECOXIB 200 MG/1
200 CAPSULE ORAL DAILY
Qty: 30 CAPSULE | Refills: 1 | Status: SHIPPED | OUTPATIENT
Start: 2021-09-29

## 2021-09-29 RX ORDER — TRIAMCINOLONE ACETONIDE 40 MG/ML
40 INJECTION, SUSPENSION INTRA-ARTICULAR; INTRAMUSCULAR ONCE
Status: COMPLETED | OUTPATIENT
Start: 2021-09-29 | End: 2021-09-29

## 2021-09-29 RX ADMIN — TRIAMCINOLONE ACETONIDE 40 MG: 40 INJECTION, SUSPENSION INTRA-ARTICULAR; INTRAMUSCULAR at 16:23

## 2021-09-29 NOTE — PROGRESS NOTES
Vladimir Jama  1955   Chief Complaint   Patient presents with    Knee Pain     right knee        HISTORY OF PRESENT ILLNESS  Vladimir Jama is a 74 y.o. female who presents today for evaluation of right knee. Patient rates pain as 8/10 today. Pain has been present for 2 weeks. Notes a giving away sensation and pain with walking. Associated symptoms includes a constant aching. No prior injury. Patient denies any fever, chills, chest pain, shortness of breath or calf pain. The remainder of the review of systems is negative. There are no new illness or injuries to report since last seen in the office. There are no changes to medications, allergies, family or social history. PHYSICAL EXAM:   Visit Vitals  Pulse 89   Temp 97.1 °F (36.2 °C) (Temporal)   Ht 5' 7\" (1.702 m)   Wt 127 lb (57.6 kg)   SpO2 99%   BMI 19.89 kg/m²     The patient is a well-developed, well-nourished female   in no acute distress. The patient is alert and oriented times three. The patient is alert and oriented times three. Mood and affect are normal.  LYMPHATIC: lymph nodes are not enlarged and are within normal limits  SKIN: normal in color and non tender to palpation. There are no bruises or abrasions noted. NEUROLOGICAL: Motor sensory exam is within normal limits. Reflexes are equal bilaterally.  There is normal sensation to pinprick and light touch  MUSCULOSKELETAL:   Examination Right knee   Skin Intact   Range of motion 0-130   Effusion +   Medial joint line tenderness +   Lateral joint line tenderness -   Tenderness Pes Bursa -   Tenderness insertion MCL -   Tenderness insertion LCL -   Theodoras -   Patella crepitus +   Patella grind +   Lachman -   Pivot shift -   Anterior drawer -   Posterior drawer -   Varus stress -   Valgus stress -   Neurovascular Intact   Calf Swelling and Tenderness to Palpation -   Angela's Test -   Hamstring Cord Tightness -     PROCEDURE:   Right knee Injection with Ultrasound Guidance    Indication:Right Knee pain/swelling    After sterile prep, 4 cc of Xylocaine and 1 cc of Kenalog were injected into the right Knee. Intra-articular Ultrasound images captured using [address] [place] [address] Loop Ultrasound machine using a frequency of 10 MHz with a linear transducer and scanned into patient's chart. [facility]  OFFICE PROCEDURE PROGRESS NOTE        Chart reviewed for the following:  Shara Liao M.D, have reviewed the History, Physical and updated the Allergic reactions for [address] [place] [address] performed immediately prior to start of procedure:  Shara Liao M.D, have performed the following reviews on Rupinder Johnson prior to the start of the procedure:            * Patient was identified by name and date of birth   * Agreement on procedure being performed was verified  * Risks and Benefits explained to the patient  * Procedure site verified and marked as necessary  * Patient was positioned for comfort  * Needle placement confirmed by ultrasound  * Consent was signed and verified     Time: 3:24 PM     Date of procedure: 9/29/2021    Procedure performed by:  Kamran Rutledge M.D    Provider assisted by: (see medication administration)    How tolerated by patient: tolerated the procedure well with no complications    Comments: none      IMAGING: XR of right knee with 2 views obtained in the office dated 9/29/2021 was reviewed and read by Dr. Rebbeca Nageotte: Diffused degenerative changes in the medial compartment and patellofemoral joint        IMPRESSION:      ICD-10-CM ICD-9-CM    1. Primary osteoarthritis of right knee  M17.11 715.16 ARTHROCENTESIS ASPIR&/INJ MAJOR JT/BURSA W/US      triamcinolone acetonide (KENALOG-40) 40 mg/mL injection 40 mg      celecoxib (CeleBREX) 200 mg capsule   2. Right knee pain, unspecified chronicity  M25.561 719.46 AMB POC XRAY, KNEE; 1/2 VIEWS        PLAN:   1.   Pt presents today with right knee pain due to degenerative arthritis with joint space narrowing and I am hopeful a right knee cortisone injection will provide relief. Return in 2 weeks  Risk factors include: smoker  2. No ultrasound exam indicated today  3. No cortisone injection indicated today   4. No Physical/Occupational Therapy indicated today  5. No diagnostic test indicated today:   6. No durable medical equipment indicated today  7. No referral indicated today   8. No medications indicated today:   9. No Narcotic indicated today       RTC 2 weeks    Scribed by Krissy Trotter) as dictated by Candi Joseph MD    I, Dr. Candi Joseph, confirm that all documentation is accurate.     Candi Joseph M.D.   [facility]

## 2022-03-19 PROBLEM — M12.811 RIGHT ROTATOR CUFF TEAR ARTHROPATHY: Status: ACTIVE | Noted: 2020-05-15

## 2022-03-19 PROBLEM — M75.101 RIGHT ROTATOR CUFF TEAR ARTHROPATHY: Status: ACTIVE | Noted: 2020-05-15

## 2023-01-23 ENCOUNTER — OFFICE VISIT (OUTPATIENT)
Dept: ORTHOPEDIC SURGERY | Age: 68
End: 2023-01-23
Payer: MEDICARE

## 2023-01-23 VITALS — HEIGHT: 67 IN | BODY MASS INDEX: 20.09 KG/M2 | WEIGHT: 128 LBS

## 2023-01-23 DIAGNOSIS — M17.12 PRIMARY OSTEOARTHRITIS OF LEFT KNEE: ICD-10-CM

## 2023-01-23 DIAGNOSIS — M17.11 PRIMARY OSTEOARTHRITIS OF RIGHT KNEE: Primary | ICD-10-CM

## 2023-01-23 PROCEDURE — 3017F COLORECTAL CA SCREEN DOC REV: CPT | Performed by: ORTHOPAEDIC SURGERY

## 2023-01-23 PROCEDURE — 1101F PT FALLS ASSESS-DOCD LE1/YR: CPT | Performed by: ORTHOPAEDIC SURGERY

## 2023-01-23 PROCEDURE — 20611 DRAIN/INJ JOINT/BURSA W/US: CPT | Performed by: ORTHOPAEDIC SURGERY

## 2023-01-23 PROCEDURE — G8427 DOCREV CUR MEDS BY ELIG CLIN: HCPCS | Performed by: ORTHOPAEDIC SURGERY

## 2023-01-23 PROCEDURE — 1090F PRES/ABSN URINE INCON ASSESS: CPT | Performed by: ORTHOPAEDIC SURGERY

## 2023-01-23 PROCEDURE — G8420 CALC BMI NORM PARAMETERS: HCPCS | Performed by: ORTHOPAEDIC SURGERY

## 2023-01-23 PROCEDURE — G8400 PT W/DXA NO RESULTS DOC: HCPCS | Performed by: ORTHOPAEDIC SURGERY

## 2023-01-23 PROCEDURE — 1123F ACP DISCUSS/DSCN MKR DOCD: CPT | Performed by: ORTHOPAEDIC SURGERY

## 2023-01-23 PROCEDURE — 99214 OFFICE O/P EST MOD 30 MIN: CPT | Performed by: ORTHOPAEDIC SURGERY

## 2023-01-23 PROCEDURE — G8432 DEP SCR NOT DOC, RNG: HCPCS | Performed by: ORTHOPAEDIC SURGERY

## 2023-01-23 PROCEDURE — G8536 NO DOC ELDER MAL SCRN: HCPCS | Performed by: ORTHOPAEDIC SURGERY

## 2023-01-23 RX ORDER — TRIAMCINOLONE ACETONIDE 40 MG/ML
80 INJECTION, SUSPENSION INTRA-ARTICULAR; INTRAMUSCULAR ONCE
Status: COMPLETED | OUTPATIENT
Start: 2023-01-23 | End: 2023-01-23

## 2023-01-23 RX ADMIN — TRIAMCINOLONE ACETONIDE 80 MG: 40 INJECTION, SUSPENSION INTRA-ARTICULAR; INTRAMUSCULAR at 15:34

## 2023-01-23 NOTE — PROGRESS NOTES
Miah Leger  1955   Chief Complaint   Patient presents with    Knee Pain     Right knee pain        HISTORY OF PRESENT ILLNESS  Miah Leger is a 77 y.o. female who presents today for reevaluation of right knee. Patient rates pain as 7/10 today. She was last seen here in September 2021 at which time she received a right knee cortisone injection. She notes returning right knee pain today. Describes a shooting, sharp pain in the lateral side of the right leg/knee with stairs. Also notes left knee pain with stairs and getting up from a seated position. Patient denies any fever, chills, chest pain, shortness of breath or calf pain. The remainder of the review of systems is negative. There are no new illness or injuries to report since last seen in the office. There are no changes to medications, allergies, family or social history. PHYSICAL EXAM:   Visit Vitals  Ht 5' 7\" (1.702 m)   Wt 128 lb (58.1 kg)   BMI 20.05 kg/m²     The patient is a well-developed, well-nourished female   in no acute distress. The patient is alert and oriented times three. The patient is alert and oriented times three. Mood and affect are normal.  LYMPHATIC: lymph nodes are not enlarged and are within normal limits  SKIN: normal in color and non tender to palpation. There are no bruises or abrasions noted. NEUROLOGICAL: Motor sensory exam is within normal limits. Reflexes are equal bilaterally.  There is normal sensation to pinprick and light touch  MUSCULOSKELETAL:  Examination Right knee   Skin Intact   Range of motion 0-130   Effusion +   Medial joint line tenderness +   Lateral joint line tenderness -   Tenderness Pes Bursa -   Tenderness insertion MCL -   Tenderness insertion LCL -   Theodoras -   Patella crepitus +   Patella grind +   Lachman -   Pivot shift -   Anterior drawer -   Posterior drawer -   Varus stress -   Valgus stress -   Neurovascular Intact   Calf Swelling and Tenderness to Palpation -   Angela's Test -   Hamstring Cord Tightness -       Examination Left knee   Skin Intact   Range of motion 0-130   Effusion +   Medial joint line tenderness +   Lateral joint line tenderness -   Tenderness Pes Bursa -   Tenderness insertion MCL -   Tenderness insertion LCL -   Theodoras -   Patella crepitus +   Patella grind +   Lachman -   Pivot shift -   Anterior drawer -   Posterior drawer -   Varus stress -   Valgus stress -   Neurovascular Intact   Calf Swelling and Tenderness to Palpation -   Angela's Test -   Hamstring Cord Tightness -       PROCEDURE:   Bilateral Knee Injection with Ultrasound Guidance    Indication:Bilateral Knee pain/swelling    After sterile prep, 4 cc of Xylocaine and 1 cc of Kenalog were injected into the bilateral  Knee. Intra-articular Ultrasound images captured using [employer] Ultrasound machine using a frequency of 10 MHz with a linear transducer and scanned into patient's chart.            [facility] - [facility]  OFFICE PROCEDURE PROGRESS NOTE        Chart reviewed for the following:  Jaz Walls M.D, have reviewed the History, Physical and updated the Allergic reactions for [address] [place] [address] performed immediately prior to start of procedure:  Jaz Walls M.D, have performed the following reviews on [facility] prior to the start of the procedure:            * Patient was identified by name and date of birth   * Agreement on procedure being performed was verified  * Risks and Benefits explained to the patient  * Procedure site verified and marked as necessary  * Patient was positioned for comfort  * Needle placement confirmed by ultrasound  * Consent was signed and verified     Time: 3:33 PM     Date of procedure: 1/23/2023    Procedure performed by:  Vona Pallas, M.D    Provider assisted by: (see medication administration)    How tolerated by patient: tolerated the procedure well with no complications    Comments: none      IMAGING:  XR of right knee with 2 views obtained in the office dated 9/29/2021 was reviewed and read by Dr. Raiza Lainez: Diffused degenerative changes in the medial compartment and patellofemoral joint     IMPRESSION:      ICD-10-CM ICD-9-CM    1. Primary osteoarthritis of right knee  M17.11 715.16 triamcinolone acetonide (KENALOG-40) 40 mg/mL injection 80 mg      ARTHROCENTESIS ASPIR&/INJ MAJOR JT/BURSA W/US      2. Primary osteoarthritis of left knee  M17.12 715.16 triamcinolone acetonide (KENALOG-40) 40 mg/mL injection 80 mg      ARTHROCENTESIS ASPIR&/INJ MAJOR JT/BURSA W/US           PLAN:   1. Pt presents today with b/l knee pain due to primary OA and I would like to try injecting both knees with cortisone in the office. Patient was provided with physician-directed home exercise program in the office today. Risk factors include: smoker  2. No ultrasound exam indicated today  3. Yes cortisone injection indicated today B/L KNEE US  4. No Physical/Occupational Therapy indicated today  5. No diagnostic test indicated today:   6. No durable medical equipment indicated today  7. No referral indicated today   8. No medications indicated today:   9. No Narcotic indicated today      RTC 3 weeks if pain persists      Scribed by Olive Orellana [address]) as dictated by Nadege Guzman MD    I, Dr. Nadege Guzman, confirm that all documentation is accurate.     Nadege Guzman M.D.   [facility]

## 2023-09-12 NOTE — TELEPHONE ENCOUNTER
Patient to be notified at upcoming appointment        Rt shoulder arthroplasty 5/15      Mariah Gallegos from [facility] called to request we change the outpatient therapy order to home health instead - patient does not have transportation. Please update order.

## 2024-02-16 ENCOUNTER — TELEPHONE (OUTPATIENT)
Age: 69
End: 2024-02-16

## 2024-02-16 NOTE — TELEPHONE ENCOUNTER
Patient states that she had total shoulder replacement sx done in 2020 with Dr. Chavez and she needs to know if any metal or anything was used because she's preparing to schedule an mri of her rt leg and needs to know prior to scheduling.    Please advise at the earliest convenience.    Patient can be reached at [phone].

## 2025-05-14 ENCOUNTER — OFFICE VISIT (OUTPATIENT)
Facility: CLINIC | Age: 70
End: 2025-05-14

## 2025-05-14 ENCOUNTER — HOSPITAL ENCOUNTER (OUTPATIENT)
Facility: HOSPITAL | Age: 70
Setting detail: SPECIMEN
Discharge: HOME | End: 2025-05-17
Payer: MEDICARE

## 2025-05-14 VITALS
DIASTOLIC BLOOD PRESSURE: 66 MMHG | HEART RATE: 79 BPM | SYSTOLIC BLOOD PRESSURE: 125 MMHG | BODY MASS INDEX: 18.99 KG/M2 | OXYGEN SATURATION: 96 % | WEIGHT: 121 LBS | HEIGHT: 67 IN | TEMPERATURE: 97.9 F

## 2025-05-14 DIAGNOSIS — Z87.898 HISTORY OF MULTIPLE PULMONARY NODULES: ICD-10-CM

## 2025-05-14 DIAGNOSIS — M17.0 PRIMARY OSTEOARTHRITIS OF BOTH KNEES: ICD-10-CM

## 2025-05-14 DIAGNOSIS — Z78.0 POSTMENOPAUSAL: ICD-10-CM

## 2025-05-14 DIAGNOSIS — Z13.0 SCREENING FOR ENDOCRINE, NUTRITIONAL, METABOLIC AND IMMUNITY DISORDER: ICD-10-CM

## 2025-05-14 DIAGNOSIS — Z13.21 SCREENING FOR ENDOCRINE, NUTRITIONAL, METABOLIC AND IMMUNITY DISORDER: ICD-10-CM

## 2025-05-14 DIAGNOSIS — M85.80 OSTEOPENIA, UNSPECIFIED LOCATION: ICD-10-CM

## 2025-05-14 DIAGNOSIS — J30.2 SEASONAL ALLERGIES: ICD-10-CM

## 2025-05-14 DIAGNOSIS — F17.200 CURRENT SMOKER: ICD-10-CM

## 2025-05-14 DIAGNOSIS — H81.10 BENIGN PAROXYSMAL POSITIONAL VERTIGO, UNSPECIFIED LATERALITY: ICD-10-CM

## 2025-05-14 DIAGNOSIS — Z11.59 ENCOUNTER FOR HEPATITIS C SCREENING TEST FOR LOW RISK PATIENT: ICD-10-CM

## 2025-05-14 DIAGNOSIS — Z13.29 SCREENING FOR ENDOCRINE, NUTRITIONAL, METABOLIC AND IMMUNITY DISORDER: ICD-10-CM

## 2025-05-14 DIAGNOSIS — Z13.228 SCREENING FOR ENDOCRINE, NUTRITIONAL, METABOLIC AND IMMUNITY DISORDER: ICD-10-CM

## 2025-05-14 DIAGNOSIS — K21.9 GASTROESOPHAGEAL REFLUX DISEASE WITHOUT ESOPHAGITIS: ICD-10-CM

## 2025-05-14 DIAGNOSIS — Z76.89 ENCOUNTER TO ESTABLISH CARE WITH NEW DOCTOR: Primary | ICD-10-CM

## 2025-05-14 PROBLEM — G62.9 POLYNEUROPATHY: Status: ACTIVE | Noted: 2025-05-14

## 2025-05-14 PROBLEM — C44.611: Status: ACTIVE | Noted: 2025-05-14

## 2025-05-14 PROBLEM — G44.229 CHRONIC TENSION HEADACHE: Status: ACTIVE | Noted: 2025-05-14

## 2025-05-14 PROBLEM — M81.0 OSTEOPOROSIS: Status: ACTIVE | Noted: 2025-05-14

## 2025-05-14 PROBLEM — F41.1 GENERALIZED ANXIETY DISORDER: Status: ACTIVE | Noted: 2025-05-14

## 2025-05-14 PROBLEM — M27.3: Status: ACTIVE | Noted: 2025-05-14

## 2025-05-14 PROBLEM — R63.0 ANOREXIA: Status: ACTIVE | Noted: 2025-05-14

## 2025-05-14 PROBLEM — K58.9 IRRITABLE BOWEL SYNDROME: Status: ACTIVE | Noted: 2025-05-14

## 2025-05-14 LAB
25(OH)D3 SERPL-MCNC: 25.2 NG/ML (ref 30–100)
ALBUMIN SERPL-MCNC: 4.1 G/DL (ref 3.4–5)
ALBUMIN/GLOB SERPL: 1.3 (ref 0.8–1.7)
ALP SERPL-CCNC: 129 U/L (ref 45–117)
ALT SERPL-CCNC: 13 U/L (ref 10–35)
ANION GAP SERPL CALC-SCNC: 11 MMOL/L (ref 3–18)
APPEARANCE UR: CLEAR
AST SERPL-CCNC: 19 U/L (ref 10–38)
BACTERIA URNS QL MICRO: ABNORMAL /HPF
BASOPHILS # BLD: 0.08 K/UL (ref 0–0.1)
BASOPHILS NFR BLD: 1 % (ref 0–2)
BILIRUB SERPL-MCNC: 0.2 MG/DL (ref 0.2–1)
BILIRUB UR QL: NEGATIVE
BUN SERPL-MCNC: 14 MG/DL (ref 6–23)
BUN/CREAT SERPL: 25 (ref 12–20)
CALCIUM SERPL-MCNC: 10 MG/DL (ref 8.5–10.1)
CHLORIDE SERPL-SCNC: 101 MMOL/L (ref 98–107)
CHOLEST SERPL-MCNC: 184 MG/DL
CO2 SERPL-SCNC: 26 MMOL/L (ref 21–32)
COLOR UR: YELLOW
CREAT SERPL-MCNC: 0.57 MG/DL (ref 0.6–1.3)
DIFFERENTIAL METHOD BLD: ABNORMAL
EOSINOPHIL # BLD: 0.13 K/UL (ref 0–0.4)
EOSINOPHIL NFR BLD: 1.5 % (ref 0–5)
EPITH CASTS URNS QL MICRO: ABNORMAL /LPF (ref 0–5)
ERYTHROCYTE [DISTWIDTH] IN BLOOD BY AUTOMATED COUNT: 14.3 % (ref 11.6–14.5)
GLOBULIN SER CALC-MCNC: 3.2 G/DL (ref 2–4)
GLUCOSE SERPL-MCNC: 89 MG/DL (ref 74–108)
GLUCOSE UR STRIP.AUTO-MCNC: NEGATIVE MG/DL
HCT VFR BLD AUTO: 45 % (ref 35–45)
HDLC SERPL-MCNC: 43 MG/DL (ref 40–60)
HDLC SERPL: 4.3 (ref 0–5)
HGB BLD-MCNC: 14.2 G/DL (ref 12–16)
HGB UR QL STRIP: NEGATIVE
IMM GRANULOCYTES # BLD AUTO: 0.03 K/UL (ref 0–0.04)
IMM GRANULOCYTES NFR BLD AUTO: 0.4 % (ref 0–0.5)
KETONES UR QL STRIP.AUTO: NEGATIVE MG/DL
LDLC SERPL CALC-MCNC: 103 MG/DL (ref 0–100)
LEUKOCYTE ESTERASE UR QL STRIP.AUTO: NEGATIVE
LYMPHOCYTES # BLD: 2.04 K/UL (ref 0.9–3.6)
LYMPHOCYTES NFR BLD: 24.3 % (ref 21–52)
MCH RBC QN AUTO: 28 PG (ref 24–34)
MCHC RBC AUTO-ENTMCNC: 31.6 G/DL (ref 31–37)
MCV RBC AUTO: 88.8 FL (ref 78–100)
MONOCYTES # BLD: 0.96 K/UL (ref 0.05–1.2)
MONOCYTES NFR BLD: 11.4 % (ref 3–10)
MUCOUS THREADS URNS QL MICRO: ABNORMAL /LPF
NEUTS SEG # BLD: 5.16 K/UL (ref 1.8–8)
NEUTS SEG NFR BLD: 61.4 % (ref 40–73)
NITRITE UR QL STRIP.AUTO: NEGATIVE
NRBC # BLD: 0 K/UL (ref 0–0.01)
NRBC BLD-RTO: 0 PER 100 WBC
PH UR STRIP: 5.5 (ref 5–8)
PLATELET # BLD AUTO: 318 K/UL (ref 135–420)
PMV BLD AUTO: 10.5 FL (ref 9.2–11.8)
POTASSIUM SERPL-SCNC: 4.2 MMOL/L (ref 3.5–5.5)
PROT SERPL-MCNC: 7.2 G/DL (ref 6.4–8.2)
PROT UR STRIP-MCNC: NEGATIVE MG/DL
RBC # BLD AUTO: 5.07 M/UL (ref 4.2–5.3)
RBC #/AREA URNS HPF: ABNORMAL /HPF (ref 0–5)
SODIUM SERPL-SCNC: 138 MMOL/L (ref 136–145)
SP GR UR REFRACTOMETRY: 1.02 (ref 1–1.03)
TRIGL SERPL-MCNC: 190 MG/DL (ref 0–150)
UROBILINOGEN UR QL STRIP.AUTO: 0.2 EU/DL (ref 0.2–1)
VLDLC SERPL CALC-MCNC: 38 MG/DL
WBC # BLD AUTO: 8.4 K/UL (ref 4.6–13.2)
WBC URNS QL MICRO: ABNORMAL /HPF (ref 0–5)

## 2025-05-14 PROCEDURE — 81001 URINALYSIS AUTO W/SCOPE: CPT

## 2025-05-14 PROCEDURE — 85025 COMPLETE CBC W/AUTO DIFF WBC: CPT

## 2025-05-14 PROCEDURE — 80053 COMPREHEN METABOLIC PANEL: CPT

## 2025-05-14 PROCEDURE — 82306 VITAMIN D 25 HYDROXY: CPT

## 2025-05-14 PROCEDURE — 36415 COLL VENOUS BLD VENIPUNCTURE: CPT

## 2025-05-14 PROCEDURE — 80061 LIPID PANEL: CPT

## 2025-05-14 RX ORDER — MELOXICAM 15 MG/1
15 TABLET ORAL
Qty: 90 TABLET | Refills: 3 | Status: SHIPPED | OUTPATIENT
Start: 2025-05-14

## 2025-05-14 RX ORDER — MECLIZINE HYDROCHLORIDE 25 MG/1
TABLET ORAL
COMMUNITY
Start: 2024-10-23

## 2025-05-14 RX ORDER — OXYMETAZOLINE HYDROCHLORIDE 0.05 G/100ML
SPRAY NASAL
COMMUNITY
Start: 2025-03-24

## 2025-05-14 RX ORDER — PANTOPRAZOLE SODIUM 40 MG/1
40 TABLET, DELAYED RELEASE ORAL DAILY
Qty: 90 TABLET | Refills: 1 | Status: SHIPPED | OUTPATIENT
Start: 2025-05-14

## 2025-05-14 RX ORDER — CETIRIZINE HYDROCHLORIDE 10 MG/1
10 TABLET ORAL DAILY
Qty: 90 TABLET | Refills: 3 | Status: SHIPPED | OUTPATIENT
Start: 2025-05-14

## 2025-05-14 SDOH — ECONOMIC STABILITY: FOOD INSECURITY: WITHIN THE PAST 12 MONTHS, THE FOOD YOU BOUGHT JUST DIDN'T LAST AND YOU DIDN'T HAVE MONEY TO GET MORE.: NEVER TRUE

## 2025-05-14 SDOH — ECONOMIC STABILITY: FOOD INSECURITY: WITHIN THE PAST 12 MONTHS, YOU WORRIED THAT YOUR FOOD WOULD RUN OUT BEFORE YOU GOT MONEY TO BUY MORE.: NEVER TRUE

## 2025-05-14 ASSESSMENT — PATIENT HEALTH QUESTIONNAIRE - PHQ9
1. LITTLE INTEREST OR PLEASURE IN DOING THINGS: NOT AT ALL
2. FEELING DOWN, DEPRESSED OR HOPELESS: NOT AT ALL
SUM OF ALL RESPONSES TO PHQ QUESTIONS 1-9: 0

## 2025-05-14 NOTE — PROGRESS NOTES
Ashley Perry (:  1955) is a 70 y.o. female, here for evaluation of the following medical concerns:  Chief Complaint   Patient presents with    New Patient         ASSESSMENT/PLAN:    Assessment & Plan  1. Migraines.  - Experiences migraines with symptoms including cold fingers, nose tingling, and visual disturbances.  - High risk for strokes due to multiple back-to-back migraines.  - CT scan will be ordered to monitor her condition.  - Discussed the importance of monitoring for stroke symptoms.    2. Vertigo.  - Diagnosed with benign positional vertigo (BPPV).  - Meclizine has provided relief for vertigo symptoms.  - Advised to continue using meclizine as needed and perform specific maneuvers to alleviate symptoms.  - Reviewed the diagnosis and management plan for BPPV.    3. Cataracts.  - Scheduled for cataract surgery next week.  - Will get new glasses after the surgery.  - Discussed the impact of cataracts on vision and balance.  - Reviewed the plan for post-surgery vision correction.    4. Sinusitis.  - Previous CT scan showed clear sinuses and no fluid in ears.  - Symptoms of heaviness in the head were evaluated and found to be unrelated to sinusitis.  - Reviewed the CT scan results and discussed the absence of sinus issues.  - No further treatment required for sinusitis at this time.    5. Upper respiratory infection.  - Treated with steroids and Levaquin for an upper respiratory infection.  - Symptoms resolved after antibiotic treatment.  - Reviewed the treatment course and effectiveness of Levaquin.  - No further treatment required for upper respiratory infection at this time.    6. Knee arthritis.  - Arthritis in knees with fluid drained from both knees.  - Currently taking Mobic for inflammation.  - Advised to use Tylenol as needed for pain relief and avoid taking Motrin concurrently with Mobic.  - Discussed the management plan for knee arthritis and the risks associated with NSAIDs.    7. Skin

## 2025-05-18 ENCOUNTER — RESULTS FOLLOW-UP (OUTPATIENT)
Facility: CLINIC | Age: 70
End: 2025-05-18

## 2025-05-23 ENCOUNTER — HOSPITAL ENCOUNTER (OUTPATIENT)
Age: 70
Discharge: HOME | End: 2025-05-26
Payer: MEDICARE

## 2025-05-23 DIAGNOSIS — Z87.898 HISTORY OF MULTIPLE PULMONARY NODULES: ICD-10-CM

## 2025-05-23 DIAGNOSIS — F17.200 CURRENT SMOKER: ICD-10-CM

## 2025-05-23 PROCEDURE — 71271 CT THORAX LUNG CANCER SCR C-: CPT

## 2025-05-27 ENCOUNTER — TELEPHONE (OUTPATIENT)
Facility: CLINIC | Age: 70
End: 2025-05-27

## 2025-05-27 NOTE — TELEPHONE ENCOUNTER
Spoke with patient about recent labs results. Per NP Victor Manuel:     Vitamin D insufficiency take Vitamin D daily Increase water intake Cholesterol--need to discuss medication management The 10-year ASCVD risk score (Aundrea RAMIREZ, et al., 2019) is: 17.2%   Values used to calculate the score:     Age: 70 years     Sex: Female     Is Non- : No     Diabetic: No     Tobacco smoker: Yes     Systolic Blood Pressure: 125 mmHg     Is BP treated: No     HDL Cholesterol: 43 MG/DL     Total Cholesterol: 184 MG/DL

## 2025-05-28 ENCOUNTER — OFFICE VISIT (OUTPATIENT)
Facility: CLINIC | Age: 70
End: 2025-05-28
Payer: MEDICARE

## 2025-05-28 VITALS
BODY MASS INDEX: 19.21 KG/M2 | SYSTOLIC BLOOD PRESSURE: 121 MMHG | HEART RATE: 95 BPM | DIASTOLIC BLOOD PRESSURE: 75 MMHG | TEMPERATURE: 98 F | OXYGEN SATURATION: 96 % | HEIGHT: 67 IN | WEIGHT: 122.4 LBS

## 2025-05-28 DIAGNOSIS — F17.200 CURRENT SMOKER: ICD-10-CM

## 2025-05-28 DIAGNOSIS — K21.9 GASTROESOPHAGEAL REFLUX DISEASE WITHOUT ESOPHAGITIS: ICD-10-CM

## 2025-05-28 DIAGNOSIS — M81.0 OSTEOPOROSIS WITHOUT CURRENT PATHOLOGICAL FRACTURE, UNSPECIFIED OSTEOPOROSIS TYPE: Primary | ICD-10-CM

## 2025-05-28 DIAGNOSIS — J30.9 ALLERGIC RHINITIS, UNSPECIFIED SEASONALITY, UNSPECIFIED TRIGGER: ICD-10-CM

## 2025-05-28 DIAGNOSIS — Z00.00 INITIAL MEDICARE ANNUAL WELLNESS VISIT: ICD-10-CM

## 2025-05-28 PROCEDURE — 3017F COLORECTAL CA SCREEN DOC REV: CPT | Performed by: NURSE PRACTITIONER

## 2025-05-28 PROCEDURE — G0438 PPPS, INITIAL VISIT: HCPCS | Performed by: NURSE PRACTITIONER

## 2025-05-28 PROCEDURE — 1159F MED LIST DOCD IN RCRD: CPT | Performed by: NURSE PRACTITIONER

## 2025-05-28 PROCEDURE — 1160F RVW MEDS BY RX/DR IN RCRD: CPT | Performed by: NURSE PRACTITIONER

## 2025-05-28 PROCEDURE — 1123F ACP DISCUSS/DSCN MKR DOCD: CPT | Performed by: NURSE PRACTITIONER

## 2025-05-28 RX ORDER — FLUTICASONE PROPIONATE 50 MCG
2 SPRAY, SUSPENSION (ML) NASAL DAILY PRN
Qty: 16 G | Refills: 3 | Status: SHIPPED | OUTPATIENT
Start: 2025-05-28

## 2025-05-28 RX ORDER — PANTOPRAZOLE SODIUM 40 MG/1
40 TABLET, DELAYED RELEASE ORAL DAILY
Qty: 90 TABLET | Refills: 1 | Status: SHIPPED | OUTPATIENT
Start: 2025-05-28

## 2025-05-28 ASSESSMENT — LIFESTYLE VARIABLES
HOW MANY STANDARD DRINKS CONTAINING ALCOHOL DO YOU HAVE ON A TYPICAL DAY: PATIENT DOES NOT DRINK
HOW OFTEN DO YOU HAVE A DRINK CONTAINING ALCOHOL: NEVER

## 2025-05-28 ASSESSMENT — PATIENT HEALTH QUESTIONNAIRE - PHQ9
1. LITTLE INTEREST OR PLEASURE IN DOING THINGS: NOT AT ALL
SUM OF ALL RESPONSES TO PHQ QUESTIONS 1-9: 0
SUM OF ALL RESPONSES TO PHQ QUESTIONS 1-9: 0
2. FEELING DOWN, DEPRESSED OR HOPELESS: NOT AT ALL
SUM OF ALL RESPONSES TO PHQ QUESTIONS 1-9: 0
SUM OF ALL RESPONSES TO PHQ QUESTIONS 1-9: 0

## 2025-05-28 NOTE — PATIENT INSTRUCTIONS
hospital.  For other signs that concern you, call your health care provider.  Adverse reactions should be reported to the Vaccine Adverse Event Reporting System (VAERS). Your health care provider will usually file this report, or you can do it yourself. Visit the VAERS website at www.vaers.hhs.gov or call 1-707.432.6001. VAERS is only for reporting reactions, and VAERS staff members do not give medical advice.  How can I learn more?  Ask your health care provider.  Call your local or state health department.  Visit the website of the Food and Drug Administration (FDA) for vaccine package inserts and additional information at www.fda.gov/vaccines-blood-biologics/vaccines.  Contact the Centers for Disease Control and Prevention (CDC):  Call 1-548.914.4829 (5-531-GQO-INFO) or  Visit CDC's website at www.cdc.gov/vaccines.  Vaccine Information Statement  RSV (Respiratory Syncytial Virus) Vaccine  10/17/2024  Department of Health and Human Services  Centers for Disease Control and Prevention  Many vaccine information statements are available in Sammarinese and other languages. See www.immunize.org/vis  Hojas de información sobre vacunas están disponibles en español y en muchos otros idiomas. Visite www.immunize.org/vis  Care instructions adapted under license by [employer]. If you have questions about a medical condition or this instruction, always ask your healthcare professional. [employer] disclaims any warranty or liability for your use of this information.          Learning About Being Active as an Older Adult  Why is being active important as you get older?     Being active is one of the best things you can do for your health. And it's never too late to start. Being active--or getting active, if you aren't already--has definite benefits. It can:  Give you more energy,  Keep your mind sharp.  Improve balance to reduce your risk of falls.  Help you manage chronic illness with fewer medicines.  No matter how

## 2025-06-03 PROBLEM — J44.89 EMPHYSEMA WITH CHRONIC BRONCHITIS (HCC): Status: ACTIVE | Noted: 2025-06-03

## 2025-06-03 PROBLEM — R63.0 ANOREXIA: Status: RESOLVED | Noted: 2025-05-14 | Resolved: 2025-06-03

## 2025-06-03 PROBLEM — M27.3: Status: RESOLVED | Noted: 2025-05-14 | Resolved: 2025-06-03

## 2025-06-09 ENCOUNTER — HOSPITAL ENCOUNTER (OUTPATIENT)
Facility: HOSPITAL | Age: 70
Discharge: HOME | End: 2025-06-12
Payer: MEDICARE

## 2025-06-09 DIAGNOSIS — F17.200 CURRENT SMOKER: ICD-10-CM

## 2025-06-09 DIAGNOSIS — M81.0 OSTEOPOROSIS WITHOUT CURRENT PATHOLOGICAL FRACTURE, UNSPECIFIED OSTEOPOROSIS TYPE: ICD-10-CM

## 2025-06-09 PROCEDURE — 77080 DXA BONE DENSITY AXIAL: CPT

## 2025-06-12 ENCOUNTER — TELEPHONE (OUTPATIENT)
Facility: CLINIC | Age: 70
End: 2025-06-12

## 2025-06-15 ENCOUNTER — RESULTS FOLLOW-UP (OUTPATIENT)
Facility: CLINIC | Age: 70
End: 2025-06-15

## 2025-06-15 DIAGNOSIS — M81.0 AGE-RELATED OSTEOPOROSIS WITHOUT CURRENT PATHOLOGICAL FRACTURE: Primary | ICD-10-CM

## 2025-06-23 ENCOUNTER — TELEPHONE (OUTPATIENT)
Facility: CLINIC | Age: 70
End: 2025-06-23

## 2025-06-23 NOTE — TELEPHONE ENCOUNTER
Kamini from [facility] called stating received referral for patient to come to office called to verify if patient going some where else due to referral being closed. Requesting call back to confirm.   Kamini [phone]

## 2025-07-17 ENCOUNTER — TELEPHONE (OUTPATIENT)
Age: 70
End: 2025-07-17

## 2025-07-17 ENCOUNTER — TELEPHONE (OUTPATIENT)
Facility: CLINIC | Age: 70
End: 2025-07-17

## 2025-07-17 NOTE — TELEPHONE ENCOUNTER
Mary @ [facility] requesting a call back from . States we are putting providers personal cell phone as a office contact on referrals. States this patients referral was the most recent.     Call back # :[phone]

## 2025-07-17 NOTE — TELEPHONE ENCOUNTER
Patient called back to schedule appointment with Dr. Marquis. Was referred by Dr. Rush for Ascending Aorta Dilation. Patient scheduled on August 12, 2025 at 8:30am. Added patient in the waitlist. Patient confirmed appointment.

## 2025-08-06 DIAGNOSIS — Q24.5: ICD-10-CM

## 2025-08-06 DIAGNOSIS — R91.8 ABNORMAL CT LUNG SCREENING: Primary | ICD-10-CM

## 2025-08-06 DIAGNOSIS — I77.810 ASCENDING AORTA DILATION: ICD-10-CM

## 2025-08-11 ENCOUNTER — OFFICE VISIT (OUTPATIENT)
Dept: CARDIOTHORACIC SURGERY | Age: 70
End: 2025-08-11
Payer: MEDICARE

## 2025-08-11 VITALS
OXYGEN SATURATION: 97 % | SYSTOLIC BLOOD PRESSURE: 133 MMHG | HEIGHT: 67 IN | DIASTOLIC BLOOD PRESSURE: 72 MMHG | WEIGHT: 123.4 LBS | BODY MASS INDEX: 19.37 KG/M2 | HEART RATE: 88 BPM

## 2025-08-11 DIAGNOSIS — J43.9 PULMONARY EMPHYSEMA, UNSPECIFIED EMPHYSEMA TYPE (HCC): ICD-10-CM

## 2025-08-11 DIAGNOSIS — Z72.0 TOBACCO ABUSE: ICD-10-CM

## 2025-08-11 DIAGNOSIS — I77.810 AORTIC ECTASIA, THORACIC: Primary | ICD-10-CM

## 2025-08-11 PROCEDURE — G8420 CALC BMI NORM PARAMETERS: HCPCS | Performed by: THORACIC SURGERY (CARDIOTHORACIC VASCULAR SURGERY)

## 2025-08-11 PROCEDURE — 3023F SPIROM DOC REV: CPT | Performed by: THORACIC SURGERY (CARDIOTHORACIC VASCULAR SURGERY)

## 2025-08-11 PROCEDURE — 1123F ACP DISCUSS/DSCN MKR DOCD: CPT | Performed by: THORACIC SURGERY (CARDIOTHORACIC VASCULAR SURGERY)

## 2025-08-11 PROCEDURE — 3017F COLORECTAL CA SCREEN DOC REV: CPT | Performed by: THORACIC SURGERY (CARDIOTHORACIC VASCULAR SURGERY)

## 2025-08-11 PROCEDURE — 99204 OFFICE O/P NEW MOD 45 MIN: CPT | Performed by: THORACIC SURGERY (CARDIOTHORACIC VASCULAR SURGERY)

## 2025-08-11 PROCEDURE — 1159F MED LIST DOCD IN RCRD: CPT | Performed by: THORACIC SURGERY (CARDIOTHORACIC VASCULAR SURGERY)

## 2025-08-11 PROCEDURE — G8427 DOCREV CUR MEDS BY ELIG CLIN: HCPCS | Performed by: THORACIC SURGERY (CARDIOTHORACIC VASCULAR SURGERY)

## 2025-08-11 PROCEDURE — 1090F PRES/ABSN URINE INCON ASSESS: CPT | Performed by: THORACIC SURGERY (CARDIOTHORACIC VASCULAR SURGERY)

## 2025-08-11 PROCEDURE — G8399 PT W/DXA RESULTS DOCUMENT: HCPCS | Performed by: THORACIC SURGERY (CARDIOTHORACIC VASCULAR SURGERY)

## 2025-08-11 PROCEDURE — 4004F PT TOBACCO SCREEN RCVD TLK: CPT | Performed by: THORACIC SURGERY (CARDIOTHORACIC VASCULAR SURGERY)

## 2025-08-11 RX ORDER — ERGOCALCIFEROL 1.25 MG/1
50000 CAPSULE, LIQUID FILLED ORAL WEEKLY
COMMUNITY

## (undated) DEVICE — STERILE POLYISOPRENE POWDER-FREE SURGICAL GLOVES: Brand: PROTEXIS

## (undated) DEVICE — SOLUTION IRRIG 3000ML 0.9% SOD CHL FLX CONT 0797208] ICU MEDICAL INC]

## (undated) DEVICE — NEEDLE NRV BLK 21GA L4IN 30DEG INSUL BVL EXTN SET STIMUPLEX

## (undated) DEVICE — DRAIN SURG L0.75IN TRCR

## (undated) DEVICE — SUTURE VCRL SZ 3-0 L27IN ABSRB UD L36MM CT-1 1/2 CIR J258H

## (undated) DEVICE — Z CONVERTED USE 2273640 DEPRESSOR TNGE SMOOTH 0.69X6 IN WHT BIRCH BLADE NS PURITAN

## (undated) DEVICE — Device

## (undated) DEVICE — BLANKET WRM AD W50XL85.8IN PACU FULL BODY FORC AIR

## (undated) DEVICE — 4.0MM EGG BUR

## (undated) DEVICE — WATERPROOF, BACTERIA PROOF DRESSING WITH ABSORBENT SEE THROUGH PAD: Brand: OPSITE POST-OP VISIBLE 20X10CM CTN 20

## (undated) DEVICE — PEN MARKING LAB --

## (undated) DEVICE — BLANKET WRM W40.2XL55.9IN IORT LO BODY + MISTRAL AIR

## (undated) DEVICE — SOLUTION IV 1000ML 0.9% SOD CHL

## (undated) DEVICE — SUTURE VCRL SZ 0 L36IN ABSRB UD L36MM CT-1 1/2 CIR J946H

## (undated) DEVICE — SUTURE MCRYL SZ 3-0 L27IN ABSRB UD L24MM PS-1 3/8 CIR PRIM Y936H

## (undated) DEVICE — SUCTION COBB

## (undated) DEVICE — HANDPIECE SET WITH HIGH FLOW TIP AND SUCTION TUBE: Brand: INTERPULSE

## (undated) DEVICE — SOFT SILICONE HYDROCELLULAR SACRUM DRESSING WITH LOCK AWAY LAYER: Brand: ALLEVYN LIFE SACRUM (LARGE) PACK OF 10

## (undated) DEVICE — KIT SUT SZ 2 L38IN FIBERWIRE W/ TAPR NDL STR NIT LOOP MIC

## (undated) DEVICE — 4-PORT MANIFOLD: Brand: NEPTUNE 2

## (undated) DEVICE — 2108 SERIES SAGITTAL BLADE (24.8 X 1.24 X 80.1MM)

## (undated) DEVICE — GARMENT,MEDLINE,DVT,SEQUENTIAL,CALF,MD: Brand: MEDLINE

## (undated) DEVICE — PACK PROCEDURE SURG TOT HIP BSHR LF

## (undated) DEVICE — INTENDED FOR TISSUE SEPARATION, AND OTHER PROCEDURES THAT REQUIRE A SHARP SURGICAL BLADE TO PUNCTURE OR CUT.: Brand: BARD-PARKER ®  SAFETY SCALPED

## (undated) DEVICE — APPLICATOR BNDG 1MM ADH PREMIERPRO EXOFIN

## (undated) DEVICE — (D)PREP SKN CHLRAPRP APPL 26ML -- CONVERT TO ITEM 371833

## (undated) DEVICE — PAD,ABDOMINAL,8"X10",ST,LF: Brand: MEDLINE

## (undated) DEVICE — Z DISCONTINUED USE ITEM 2150868 IMMOBILIZER SHLDR M BLK BASIC ABD SLNG

## (undated) DEVICE — SPIROMETER INCENT 2500ML W ONE W VLV

## (undated) DEVICE — SUTURE FIBERWIRE SZ 2 W/ TAPERED NEEDLE BLUE L38IN NONABSORB BLU L26.5MM 1/2 CIRCLE AR7200

## (undated) DEVICE — SPONGE LAP 18X18IN STRL -- 5/PK

## (undated) DEVICE — KIT CLN UP BON SECOURS MARYV